# Patient Record
Sex: MALE | Race: WHITE | Employment: OTHER | URBAN - METROPOLITAN AREA
[De-identification: names, ages, dates, MRNs, and addresses within clinical notes are randomized per-mention and may not be internally consistent; named-entity substitution may affect disease eponyms.]

---

## 2018-05-01 PROBLEM — Z79.01 LONG TERM (CURRENT) USE OF ANTICOAGULANTS: Status: ACTIVE | Noted: 2018-05-01

## 2018-09-19 ENCOUNTER — APPOINTMENT (OUTPATIENT)
Dept: GENERAL RADIOLOGY | Age: 73
DRG: 246 | End: 2018-09-19
Attending: EMERGENCY MEDICINE
Payer: COMMERCIAL

## 2018-09-19 ENCOUNTER — HOSPITAL ENCOUNTER (INPATIENT)
Age: 73
LOS: 2 days | Discharge: HOME | DRG: 246 | End: 2018-09-21
Attending: EMERGENCY MEDICINE | Admitting: INTERNAL MEDICINE
Payer: COMMERCIAL

## 2018-09-19 DIAGNOSIS — I21.4 NSTEMI (NON-ST ELEVATED MYOCARDIAL INFARCTION) (HCC): ICD-10-CM

## 2018-09-19 DIAGNOSIS — J44.9 CHRONIC OBSTRUCTIVE PULMONARY DISEASE, UNSPECIFIED COPD TYPE (HCC): ICD-10-CM

## 2018-09-19 DIAGNOSIS — I25.9 CHEST PAIN DUE TO MYOCARDIAL ISCHEMIA, UNSPECIFIED ISCHEMIC CHEST PAIN TYPE: Primary | ICD-10-CM

## 2018-09-19 PROBLEM — Z95.1 S/P CABG X 2: Chronic | Status: ACTIVE | Noted: 2018-09-19

## 2018-09-19 LAB
ALBUMIN SERPL-MCNC: 3.1 G/DL (ref 3.2–4.6)
ALBUMIN/GLOB SERPL: 1 {RATIO} (ref 1.2–3.5)
ALP SERPL-CCNC: 144 U/L (ref 50–136)
ALT SERPL-CCNC: 31 U/L (ref 12–65)
ANION GAP SERPL CALC-SCNC: 7 MMOL/L (ref 7–16)
AST SERPL-CCNC: 28 U/L (ref 15–37)
ATRIAL RATE: 77 BPM
BASOPHILS # BLD: 0.1 K/UL (ref 0–0.2)
BASOPHILS NFR BLD: 1 % (ref 0–2)
BILIRUB SERPL-MCNC: 0.6 MG/DL (ref 0.2–1.1)
BUN SERPL-MCNC: 18 MG/DL (ref 8–23)
CALCIUM SERPL-MCNC: 8 MG/DL (ref 8.3–10.4)
CALCULATED P AXIS, ECG09: 67 DEGREES
CALCULATED R AXIS, ECG10: 36 DEGREES
CALCULATED T AXIS, ECG11: 89 DEGREES
CHLORIDE SERPL-SCNC: 113 MMOL/L (ref 98–107)
CO2 SERPL-SCNC: 25 MMOL/L (ref 21–32)
CREAT SERPL-MCNC: 1.18 MG/DL (ref 0.8–1.5)
DIAGNOSIS, 93000: NORMAL
DIFFERENTIAL METHOD BLD: ABNORMAL
EOSINOPHIL # BLD: 0.1 K/UL (ref 0–0.8)
EOSINOPHIL NFR BLD: 1 % (ref 0.5–7.8)
ERYTHROCYTE [DISTWIDTH] IN BLOOD BY AUTOMATED COUNT: 13.6 %
GLOBULIN SER CALC-MCNC: 3.2 G/DL (ref 2.3–3.5)
GLUCOSE SERPL-MCNC: 96 MG/DL (ref 65–100)
HCT VFR BLD AUTO: 44.5 % (ref 41.1–50.3)
HGB BLD-MCNC: 14.4 G/DL (ref 13.6–17.2)
IMM GRANULOCYTES # BLD: 0.3 K/UL (ref 0–0.5)
IMM GRANULOCYTES NFR BLD AUTO: 2 % (ref 0–5)
INR PPP: 2.3
LYMPHOCYTES # BLD: 1.4 K/UL (ref 0.5–4.6)
LYMPHOCYTES NFR BLD: 11 % (ref 13–44)
MCH RBC QN AUTO: 31.3 PG (ref 26.1–32.9)
MCHC RBC AUTO-ENTMCNC: 32.4 G/DL (ref 31.4–35)
MCV RBC AUTO: 96.7 FL (ref 79.6–97.8)
MONOCYTES # BLD: 1 K/UL (ref 0.1–1.3)
MONOCYTES NFR BLD: 7 % (ref 4–12)
NEUTS SEG # BLD: 10.4 K/UL (ref 1.7–8.2)
NEUTS SEG NFR BLD: 79 % (ref 43–78)
NRBC # BLD: 0 K/UL (ref 0–0.2)
P-R INTERVAL, ECG05: 120 MS
PLATELET # BLD AUTO: 234 K/UL (ref 150–450)
PMV BLD AUTO: 10.3 FL (ref 9.4–12.3)
POTASSIUM SERPL-SCNC: 3.9 MMOL/L (ref 3.5–5.1)
PROT SERPL-MCNC: 6.3 G/DL (ref 6.3–8.2)
PROTHROMBIN TIME: 24.2 SEC (ref 11.5–14.5)
Q-T INTERVAL, ECG07: 356 MS
QRS DURATION, ECG06: 86 MS
QTC CALCULATION (BEZET), ECG08: 402 MS
RBC # BLD AUTO: 4.6 M/UL (ref 4.23–5.6)
SODIUM SERPL-SCNC: 145 MMOL/L (ref 136–145)
TROPONIN I BLD-MCNC: 0.23 NG/ML (ref 0.02–0.05)
TROPONIN I SERPL-MCNC: 2.5 NG/ML (ref 0.02–0.05)
VENTRICULAR RATE, ECG03: 77 BPM
WBC # BLD AUTO: 13.2 K/UL (ref 4.3–11.1)

## 2018-09-19 PROCEDURE — 93005 ELECTROCARDIOGRAM TRACING: CPT | Performed by: EMERGENCY MEDICINE

## 2018-09-19 PROCEDURE — 74011250637 HC RX REV CODE- 250/637: Performed by: NURSE PRACTITIONER

## 2018-09-19 PROCEDURE — 94640 AIRWAY INHALATION TREATMENT: CPT

## 2018-09-19 PROCEDURE — 85025 COMPLETE CBC W/AUTO DIFF WBC: CPT

## 2018-09-19 PROCEDURE — 71045 X-RAY EXAM CHEST 1 VIEW: CPT

## 2018-09-19 PROCEDURE — 36415 COLL VENOUS BLD VENIPUNCTURE: CPT

## 2018-09-19 PROCEDURE — 74011250636 HC RX REV CODE- 250/636: Performed by: INTERNAL MEDICINE

## 2018-09-19 PROCEDURE — 74011000250 HC RX REV CODE- 250: Performed by: EMERGENCY MEDICINE

## 2018-09-19 PROCEDURE — 85610 PROTHROMBIN TIME: CPT

## 2018-09-19 PROCEDURE — 80053 COMPREHEN METABOLIC PANEL: CPT

## 2018-09-19 PROCEDURE — 99285 EMERGENCY DEPT VISIT HI MDM: CPT | Performed by: EMERGENCY MEDICINE

## 2018-09-19 PROCEDURE — 65660000000 HC RM CCU STEPDOWN

## 2018-09-19 PROCEDURE — 94760 N-INVAS EAR/PLS OXIMETRY 1: CPT

## 2018-09-19 PROCEDURE — 74011250637 HC RX REV CODE- 250/637: Performed by: INTERNAL MEDICINE

## 2018-09-19 PROCEDURE — 84484 ASSAY OF TROPONIN QUANT: CPT

## 2018-09-19 RX ORDER — HYDROCODONE BITARTRATE AND ACETAMINOPHEN 7.5; 325 MG/1; MG/1
1 TABLET ORAL
Status: DISCONTINUED | OUTPATIENT
Start: 2018-09-19 | End: 2018-09-20

## 2018-09-19 RX ORDER — OMEPRAZOLE 40 MG/1
40 CAPSULE, DELAYED RELEASE ORAL DAILY
Status: ON HOLD | COMMUNITY
End: 2018-09-26 | Stop reason: CLARIF

## 2018-09-19 RX ORDER — TOPIRAMATE 100 MG/1
100 TABLET, FILM COATED ORAL 2 TIMES DAILY
Status: DISCONTINUED | OUTPATIENT
Start: 2018-09-19 | End: 2018-09-19 | Stop reason: SDUPTHER

## 2018-09-19 RX ORDER — NITROGLYCERIN 0.4 MG/1
0.4 TABLET SUBLINGUAL
Status: DISCONTINUED | OUTPATIENT
Start: 2018-09-19 | End: 2018-09-21 | Stop reason: HOSPADM

## 2018-09-19 RX ORDER — ATORVASTATIN CALCIUM 40 MG/1
40 TABLET, FILM COATED ORAL
Status: DISCONTINUED | OUTPATIENT
Start: 2018-09-19 | End: 2018-09-21 | Stop reason: HOSPADM

## 2018-09-19 RX ORDER — SODIUM CHLORIDE 0.9 % (FLUSH) 0.9 %
5-10 SYRINGE (ML) INJECTION AS NEEDED
Status: DISCONTINUED | OUTPATIENT
Start: 2018-09-19 | End: 2018-09-21 | Stop reason: HOSPADM

## 2018-09-19 RX ORDER — ASPIRIN 81 MG/1
81 TABLET ORAL DAILY
Status: DISCONTINUED | OUTPATIENT
Start: 2018-09-20 | End: 2018-09-21

## 2018-09-19 RX ORDER — MORPHINE SULFATE 2 MG/ML
2 INJECTION, SOLUTION INTRAMUSCULAR; INTRAVENOUS
Status: DISCONTINUED | OUTPATIENT
Start: 2018-09-19 | End: 2018-09-21 | Stop reason: HOSPADM

## 2018-09-19 RX ORDER — HEPARIN SODIUM 5000 [USP'U]/ML
4000 INJECTION, SOLUTION INTRAVENOUS; SUBCUTANEOUS ONCE
Status: DISCONTINUED | OUTPATIENT
Start: 2018-09-20 | End: 2018-09-19

## 2018-09-19 RX ORDER — VENLAFAXINE 25 MG/1
25 TABLET ORAL 3 TIMES DAILY
Status: DISCONTINUED | OUTPATIENT
Start: 2018-09-19 | End: 2018-09-19

## 2018-09-19 RX ORDER — LISINOPRIL 5 MG/1
2.5 TABLET ORAL DAILY
Status: DISCONTINUED | OUTPATIENT
Start: 2018-09-20 | End: 2018-09-21 | Stop reason: HOSPADM

## 2018-09-19 RX ORDER — ALBUTEROL SULFATE 0.83 MG/ML
5 SOLUTION RESPIRATORY (INHALATION)
Status: COMPLETED | OUTPATIENT
Start: 2018-09-19 | End: 2018-09-19

## 2018-09-19 RX ORDER — TOPIRAMATE 100 MG/1
100 TABLET, FILM COATED ORAL DAILY
Status: DISCONTINUED | OUTPATIENT
Start: 2018-09-20 | End: 2018-09-21 | Stop reason: HOSPADM

## 2018-09-19 RX ORDER — ISOSORBIDE MONONITRATE 30 MG/1
30 TABLET, EXTENDED RELEASE ORAL DAILY
Status: DISCONTINUED | OUTPATIENT
Start: 2018-09-20 | End: 2018-09-21 | Stop reason: HOSPADM

## 2018-09-19 RX ORDER — HEPARIN SODIUM 5000 [USP'U]/100ML
12-25 INJECTION, SOLUTION INTRAVENOUS
Status: DISCONTINUED | OUTPATIENT
Start: 2018-09-20 | End: 2018-09-19

## 2018-09-19 RX ORDER — SODIUM CHLORIDE 0.9 % (FLUSH) 0.9 %
5-10 SYRINGE (ML) INJECTION EVERY 8 HOURS
Status: DISCONTINUED | OUTPATIENT
Start: 2018-09-19 | End: 2018-09-21 | Stop reason: HOSPADM

## 2018-09-19 RX ORDER — LANOLIN ALCOHOL/MO/W.PET/CERES
400 CREAM (GRAM) TOPICAL DAILY
Status: DISCONTINUED | OUTPATIENT
Start: 2018-09-20 | End: 2018-09-21 | Stop reason: HOSPADM

## 2018-09-19 RX ORDER — TOPIRAMATE 100 MG/1
100 TABLET, FILM COATED ORAL 2 TIMES DAILY
Status: DISCONTINUED | OUTPATIENT
Start: 2018-09-19 | End: 2018-09-19

## 2018-09-19 RX ORDER — LEVOTHYROXINE SODIUM 50 UG/1
25 TABLET ORAL
Status: DISCONTINUED | OUTPATIENT
Start: 2018-09-20 | End: 2018-09-21 | Stop reason: HOSPADM

## 2018-09-19 RX ORDER — LORAZEPAM 1 MG/1
1 TABLET ORAL
Status: DISCONTINUED | OUTPATIENT
Start: 2018-09-19 | End: 2018-09-21 | Stop reason: HOSPADM

## 2018-09-19 RX ORDER — HEPARIN SODIUM 5000 [USP'U]/100ML
12-25 INJECTION, SOLUTION INTRAVENOUS
Status: DISCONTINUED | OUTPATIENT
Start: 2018-09-20 | End: 2018-09-21 | Stop reason: HOSPADM

## 2018-09-19 RX ORDER — PANTOPRAZOLE SODIUM 40 MG/1
40 TABLET, DELAYED RELEASE ORAL
Status: DISCONTINUED | OUTPATIENT
Start: 2018-09-20 | End: 2018-09-21 | Stop reason: HOSPADM

## 2018-09-19 RX ORDER — VENLAFAXINE 25 MG/1
25 TABLET ORAL
Status: DISCONTINUED | OUTPATIENT
Start: 2018-09-19 | End: 2018-09-21 | Stop reason: HOSPADM

## 2018-09-19 RX ORDER — TAMSULOSIN HYDROCHLORIDE 0.4 MG/1
0.4 CAPSULE ORAL DAILY
Status: DISCONTINUED | OUTPATIENT
Start: 2018-09-20 | End: 2018-09-21 | Stop reason: HOSPADM

## 2018-09-19 RX ORDER — PREDNISONE 1 MG/1
5 TABLET ORAL 2 TIMES DAILY
COMMUNITY
End: 2018-09-26

## 2018-09-19 RX ORDER — ONDANSETRON 2 MG/ML
4 INJECTION INTRAMUSCULAR; INTRAVENOUS
Status: DISCONTINUED | OUTPATIENT
Start: 2018-09-19 | End: 2018-09-21 | Stop reason: HOSPADM

## 2018-09-19 RX ADMIN — VENLAFAXINE 25 MG: 25 TABLET ORAL at 22:34

## 2018-09-19 RX ADMIN — HEPARIN SODIUM AND DEXTROSE 12 UNITS/KG/HR: 5000; 5 INJECTION INTRAVENOUS at 22:42

## 2018-09-19 RX ADMIN — Medication 10 ML: at 22:35

## 2018-09-19 RX ADMIN — HYDROCODONE BITARTRATE AND ACETAMINOPHEN 1 TABLET: 7.5; 325 TABLET ORAL at 21:10

## 2018-09-19 RX ADMIN — ALBUTEROL SULFATE 5 MG: 2.5 SOLUTION RESPIRATORY (INHALATION) at 16:59

## 2018-09-19 RX ADMIN — ATORVASTATIN CALCIUM 40 MG: 40 TABLET, FILM COATED ORAL at 22:35

## 2018-09-19 NOTE — PROGRESS NOTES
Skin assessment completed with second RN and reveals the following: extensive bruising on BUE's. Heels flaky, toenails thick. Sacrum/inner gluteal folds with pinkness; however blanchable. Scattered moles, no other abnormality noted.

## 2018-09-19 NOTE — IP AVS SNAPSHOT
[address] 
[phone] Patient: Eddie Christianson MRN: [id number] ZPA:[id number] About your hospitalization You were admitted on:  September 19, 2018 You last received care in the:  [facility] 3 TELEMETRY You were discharged on:  September 21, 2018 Why you were hospitalized Your primary diagnosis was:  Nstemi (Non-St Elevated Myocardial Infarction) (Formerly [facility]) Your diagnoses also included: Aortic Stenosis, S/P Avr (Aortic Valve Replacement), Essential Hypertension, Tobacco Use Disorder, Mixed Hyperlipidemia, Coronary Atherosclerosis Of Native Coronary Artery, S/P Cabg X 2, Copd (Chronic Obstructive Pulmonary Disease) (Hcc) Follow-up Information Follow up With Details Comments Contact Info Tariq Madden MD  As needed 1 [address] [id number] 
[phone] [facility] CARDIOLOGY [place] On 9/26/2018 Follow up with Dr. Eddie Baez on 9-26-18 @ [phone] in the [facility] office [id number] [place] [id number] [phone] [facility]  check INR on Tuesday [id number] [place] [phone] Your Scheduled Appointments Wednesday September 26, 2018 12:00 PM EDT TRANSITIONAL CARE MANAGEMENT with Brenden Oakes MD  
[address] [facility] ([address] [place] [address]) [address] [place] [address] [place] [id number] [phone] Discharge Orders Procedure Order Date Status Priority Quantity Spec Type Associated Dx REFERRAL TO CARDIAC [facility] 09/21/18 0910 Normal Routine 1 A check jerardo indicates which time of day the medication should be taken. My Medications START taking these medications Instructions Each Dose to Equal  
 Morning Noon Evening Bedtime  
 clopidogrel 75 mg Tab Commonly known as:  PLAVIX Start taking on:  9/22/2018 Take 1 Tab by mouth daily.   
 75 mg  
    
   
 lisinopril 2.5 mg tablet Commonly known as:  Marilin Primmer Start taking on:  9/22/2018 Take 1 Tab by mouth daily. 2.5 mg  
    
   
   
   
  
 metoprolol succinate 25 mg XL tablet Commonly known as:  TOPROL-XL Start taking on:  9/22/2018 Take 1 Tab by mouth daily. 25 mg  
    
   
   
   
  
 nitroglycerin 0.4 mg SL tablet Commonly known as:  NITROSTAT  
   
 1 Tab by SubLINGual route every five (5) minutes as needed for Chest Pain. Up to 3 doses. 0.4 mg  
    
   
   
   
  
  
CONTINUE taking these medications Instructions Each Dose to Equal  
 Morning Noon Evening Bedtime  
 aspirin delayed-release 81 mg tablet Take  by mouth daily. atorvastatin 40 mg tablet Commonly known as:  LIPITOR Take  by mouth daily. Dexlansoprazole 60 mg Cpdb Take  by mouth. EYE VITAMIN AND MINERALS PO Take  by mouth. fish oil-omega-3 fatty acids 340-1,000 mg capsule Take 1 Cap by mouth daily. 1 Cap HYDROcodone-acetaminophen 7.5-325 mg per tablet Commonly known as:  March Castles Take  by mouth.  
     
   
   
   
  
 isosorbide mononitrate ER 30 mg tablet Commonly known as:  IMDUR Take  by mouth daily. levothyroxine 25 mcg tablet Commonly known as:  SYNTHROID Take  by mouth Daily (before breakfast). MAGNESIUM PO Take  by mouth.  
     
   
   
   
  
 potassium 99 mg tablet Take 99 mg by mouth daily. 99 mg  
    
   
   
   
  
 predniSONE 1 mg tablet Commonly known as:  Pallavien Arbour Take 5 mg by mouth two (2) times a day. Indications: breathing 5 mg PriLOSEC 40 mg capsule Generic drug:  omeprazole Take 40 mg by mouth daily. 40 mg  
    
  
   
   
   
  
 tamsulosin 0.4 mg capsule Commonly known as:  FLOMAX Take 0.4 mg by mouth daily. 0.4 mg  
    
  
   
   
   
  
 tiotropium 18 mcg inhalation capsule Commonly known as:  Sonia Oglesby Take 1 Cap by inhalation daily. 1 Cap  
    
   
   
   
  
 topiramate 100 mg tablet Commonly known as:  TOPAMAX Take 100 mg by mouth daily. 100 mg  
    
   
   
   
  
 venlafaxine 75 mg tablet Commonly known as:  [facility] Take 25 mg by mouth nightly. 25 mg  
    
   
   
   
  
 warfarin 1 mg tablet Commonly known as:  COUMADIN Take 1 mg by mouth daily. Brand name 1 mg Where to Get Your Medications Information on where to get these meds will be given to you by the nurse or doctor. ! Ask your nurse or doctor about these medications  
  clopidogrel 75 mg Tab  
 lisinopril 2.5 mg tablet  
 metoprolol succinate 25 mg XL tablet  
 nitroglycerin 0.4 mg SL tablet Opioid Education Prescription Opioids: What You Need to Know: 
 
Prescription opioids can be used to help relieve moderate-to-severe pain and are often prescribed following a surgery or injury, or for certain health conditions. These medications can be an important part of treatment but also come with serious risks. Opioids are strong pain medicines. Examples include hydrocodone, oxycodone, fentanyl, and morphine. Heroin is an example of an illegal opioid. It is important to work with your health care provider to make sure you are getting the safest, most effective care. WHAT ARE THE RISKS AND SIDE EFFECTS OF OPIOID USE? Prescription opioids carry serious risks of addiction and overdose, especially with prolonged use. An opioid overdose, often marked by slow breathing, can cause sudden death. The use of prescription opioids can have a number of side effects as well, even when taken as directed. · Tolerance-meaning you might need to take more of a medication for the same pain relief · Physical dependence-meaning you have symptoms of withdrawal when the medication is stopped. Withdrawal symptoms can include nausea, sweating, chills, diarrhea, stomach cramps, and muscle aches. Withdrawal can last up to several weeks, depending on which drug you took and how long you took it. · Increased sensitivity to pain · Constipation · Nausea, vomiting, and dry mouth · Sleepiness and dizziness · Confusion · Depression · Low levels of testosterone that can result in lower sex drive, energy, and strength · Itching and sweating RISKS ARE GREATER WITH:      
· History of drug misuse, substance use disorder, or overdose · Mental health conditions (such as depression or anxiety) · Sleep apnea · Older age (72 years or older) · Pregnancy Avoid alcohol while taking prescription opioids. Also, unless specifically advised by your health care provider, medications to avoid include: · Benzodiazepines (such as Xanax or Valium) · Muscle relaxants (such as Soma or Flexeril) · Hypnotics (such as Ambien or Lunesta) · Other prescription opioids KNOW YOUR OPTIONS Talk to your health care provider about ways to manage your pain that don't involve prescription opioids. Some of these options may actually work better and have fewer risks and side effects. Consult your physician before adding or stopping any medications, treatments, or physical activity. Options may include: 
· Pain relievers such as acetaminophen, ibuprofen, and naproxen · Some medications that are also used for depression or seizures · Physical therapy and exercise · Counseling to help patients learn how to cope better with triggers of pain and stress. · Application of heat or cold compress · Massage therapy · Relaxation techniques Be Informed Make sure you know the name of your medication, how much and how often to take it, and its potential risks & side effects.  
 
IF YOU ARE PRESCRIBED OPIOIDS FOR PAIN: 
 · Never take opioids in greater amounts or more often than prescribed. Remember the goal is not to be pain-free but to manage your pain at a tolerable level. · Follow up with your primary care provider to: · Work together to create a plan on how to manage your pain. · Talk about ways to help manage your pain that don't involve prescription opioids. · Talk about any and all concerns and side effects. · Help prevent misuse and abuse. · Never sell or share prescription opioids · Help prevent misuse and abuse. · Store prescription opioids in a secure place and out of reach of others (this may include visitors, children, friends, and family). · Safely dispose of unused/unwanted prescription opioids: Find your community drug take-back program or your pharmacy mail-back program, or flush them down the toilet, following guidance from the Food and Drug Administration (www.fda.gov/Drugs/ResourcesForYou). · Visit www.cdc.gov/drugoverdose to learn about the risks of opioid abuse and overdose. · If you believe you may be struggling with addiction, tell your health care provider and ask for guidance or call [employer] at 4-121-117-KHMM. Discharge Instructions Cardiac Catheterization/Angiography Discharge Instructions *Check the puncture site frequently for swelling or bleeding. If you see any bleeding, lie down and apply pressure over the area with a clean town or washcloth. Notify your doctor for any redness, swelling, drainage or oozing from the puncture site. Notify your doctor for any fever or chills. *If the leg or arm with the puncture becomes cold, numb or painful, call [facility] Cardiology at  [phone]. *Activity should be limited for the next 48 hours.  Climb stairs as little as possible and avoid any stooping, bending or strenuous activity for 48 hours. No heavy lifting (anything over 10 pounds) for three days. *Do not drive for 48 hours. *You may resume your usual diet. Drink more fluids than usual. 
 
*Have a responsible person drive you home and stay with you for at least 24 hours after your heart catheterization/angiography. *You may remove the bandage from your Right and Groin in 24 hours. You may shower in 24 hours. No tub baths, hot tubs or swimming for one week. Do not place any lotions, creams, powders, ointments over the puncture site for one week. You may place a clean band-aid over the puncture site each day for 5 days. Change this daily. Percutaneous Coronary Intervention: What to Expect at [facility] Your Recovery Percutaneous coronary intervention (PCI) is the name for procedures that are used to open a narrowed or blocked coronary artery. The two most common PCI procedures are coronary angioplasty and coronary stent placement. Your groin or arm may have a bruise and feel sore for a day or two after a percutaneous coronary intervention (PCI). You can do light activities around the house, but nothing strenuous for several days. This care sheet gives you a general idea about how long it will take for you to recover. But each person recovers at a different pace. Follow the steps below to get better as quickly as possible. How can you care for yourself at home? Activity 
  · If the doctor gave you a sedative: ¨ For 24 hours, don't do anything that requires attention to detail. It takes time for the medicine's effects to completely wear off. ¨ For your safety, do not drive or operate any machinery that could be dangerous. Wait until the medicine wears off and you can think clearly and react easily.  
  · Do not do strenuous exercise and do not lift, pull, or push anything heavy until your doctor says it is okay. This may be for a day or two. You can walk around the house and do light activity, such as cooking.   · If the catheter was placed in your groin, try not to walk up stairs for the first couple of days.  
  · If the catheter was placed in your arm near your wrist, do not bend your wrist deeply for the first couple of days. Be careful using your hand to get into and out of a chair or bed.  
  · Carry your stent identification card with you at all times.  
  · If your doctor recommends it, get more exercise. Walking is a good choice. Bit by bit, increase the amount you walk every day. Try for at least 30 minutes on most days of the week. Diet 
  · Drink plenty of fluids to help your body flush out the dye. If you have kidney, heart, or liver disease and have to limit fluids, talk with your doctor before you increase the amount of fluids you drink.  
  · Keep eating a heart-healthy diet that has lots of fruits, vegetables, and whole grains. If you have not been eating this way, talk to your doctor. You also may want to talk to a dietitian. This expert can help you to learn about healthy foods and plan meals. Medicines 
  · Your doctor will tell you if and when you can restart your medicines. He or she will also give you instructions about taking any new medicines.  
  · If you take blood thinners, such as warfarin (Coumadin), clopidogrel (Plavix), or aspirin, be sure to talk to your doctor. He or she will tell you if and when to start taking those medicines again. Make sure that you understand exactly what your doctor wants you to do.  
  · Your doctor will prescribe blood-thinning medicines. You will likely take aspirin plus another antiplatelet, such as clopidogrel (Plavix). It is very important that you take these medicines exactly as directed. These medicines help keep the coronary artery open and reduce your risk of a heart attack.  
  · Call your doctor if you think you are having a problem with your medicine.  
Courtney Mota of the catheter site   · For 1 or 2 days, keep a bandage over the spot where the catheter was inserted. The bandage probably will fall off in this time.  
  · Put ice or a cold pack on the area for 10 to 20 minutes at a time to help with soreness or swelling. Put a thin cloth between the ice and your skin.  
  · You may shower 24 to 48 hours after the procedure, if your doctor okays it. Pat the incision dry.  
  · Do not soak the catheter site until it is healed. Don't take a bath for 1 week, or until your doctor tells you it is okay.  
  · If you are bleeding, lie down and press on the area for 15 minutes to try to make it stop. If the bleeding does not stop, call your doctor or seek immediate medical care. Follow-up care is a key part of your treatment and safety. Be sure to make and go to all appointments, and call your doctor if you are having problems. It's also a good idea to know your test results and keep a list of the medicines you take. When should you call for help? Call 911 anytime you think you may need emergency care. For example, call if: 
  · You passed out (lost consciousness).  
  · You have severe trouble breathing.  
  · You have sudden chest pain and shortness of breath, or you cough up blood.  
  · You have symptoms of a heart attack, such as: ¨ Chest pain or pressure. ¨ Sweating. ¨ Shortness of breath. ¨ Nausea or vomiting. ¨ Pain that spreads from the chest to the neck, jaw, or one or both shoulders or arms. ¨ Dizziness or lightheadedness. ¨ A fast or uneven pulse. After calling 911, chew 1 adult-strength aspirin. Wait for an ambulance. Do not try to drive yourself.  
  · You have been diagnosed with angina, and you have angina symptoms that do not go away with rest or are not getting better within 5 minutes after you take one dose of nitroglycerin.  
 Call your doctor now or seek immediate medical care if: 
  · You are bleeding from the area where the catheter was put in your artery.   · You have a fast-growing, painful lump at the catheter site.  
  · You have signs of infection, such as: 
¨ Increased pain, swelling, warmth, or redness. ¨ Red streaks leading from the catheter site. ¨ Pus draining from the catheter site. ¨ A fever.  
  · Your leg or arm looks blue or feels cold, numb, or tingly.  
 Watch closely for changes in your health, and be sure to contact your doctor if you have any problems. Where can you learn more? Go to http://sukhdev-vahe.info/. Enter G781 in the search box to learn more about \"Percutaneous Coronary Intervention: What to Expect at Home. \" Current as of: December 6, 2017 Content Version: 11.7 © 2773-3552 [employer]. Care instructions adapted under license by [employer] (which disclaims liability or warranty for this information). If you have questions about a medical condition or this instruction, always ask your healthcare professional. [place] [address] any warranty or liability for your use of this information. Clopidogrel (By mouth) Clopidogrel (sdsz-MWC-gn-grel) Helps prevent stroke, heart attack, and other heart problems. This medicine is a platelet inhibitor. Brand Name(s): Plavix There may be other brand names for this medicine. When This Medicine Should Not Be Used: This medicine is not right for everyone. Do not use it if you had an allergic reaction to clopidogrel. How to Use This Medicine:  
Tablet · Your doctor will tell you how much medicine to use. Do not use more than directed. · This medicine should come with a Medication Guide. Ask your pharmacist for a copy if you do not have one. · Missed dose: Take a dose as soon as you remember. If it is almost time for your next dose, wait until then and take a regular dose. Do not take extra medicine to make up for a missed dose.  
· Store the medicine in a closed container at room temperature, away from heat, moisture, and direct light. Drugs and Foods to Avoid: Ask your doctor or pharmacist before using any other medicine, including over-the-counter medicines, vitamins, and herbal products. · Some medicines can affect how clopidogrel works. Tell your doctor if you are using any of the following: ¨ Esomeprazole, omeprazole, repaglinide, or warfarin ¨ Medicine to treat depression ¨ NSAID pain or arthritis medicine (including celecoxib, diclofenac, ibuprofen, or naproxen) Warnings While Using This Medicine: · Tell your doctor if you are pregnant or breastfeeding, or if you have bleeding problems, stomach ulcer or bleeding, a recent stroke, or have a history of bleeding problems. · This medicine can cause you to bleed and bruise more easily. Take precautions to avoid injury. Brush and floss your teeth gently, do not play rough sports, and be careful with sharp objects. Severe bleeding can be life-threatening. · This medicine may cause a rare but serious blood clotting condition called thrombotic thrombocytopenic purpura. · Make sure any doctor or dentist who treats you knows that you are using this medicine. Tell your doctor if you plan to have surgery or a dental procedure. · Do not stop using this medicine suddenly. Your doctor will need to slowly decrease your dose before you stop it completely. · Your doctor will check your progress and the effects of this medicine at regular visits. Keep all appointments. · Keep all medicine out of the reach of children. Never share your medicine with anyone. Possible Side Effects While Using This Medicine:  
Call your doctor right away if you notice any of these side effects: · Allergic reaction: Itching or hives, swelling in your face or hands, swelling or tingling in your mouth or throat, chest tightness, trouble breathing · Bloody or black, tarry stools · Nosebleeds · Pinpoint red or purple spots on your skin or in your mouth · Problems with vision, speech, or walking · Red or dark brown urine · Seizures · Severe stomach pain · Trouble breathing, tiredness, fast heartbeat, yellow skin or eyes · Unusual bleeding, bruising, or weakness · Vomiting of blood or vomit that looks like coffee grounds If you notice other side effects that you think are caused by this medicine, tell your doctor. Call your doctor for medical advice about side effects. You may report side effects to FDA at 2-603-EUK-2843 © 2017 [facility] Information is for End User's use only and may not be sold, redistributed or otherwise used for commercial purposes. The above information is an  only. It is not intended as medical advice for individual conditions or treatments. Talk to your doctor, nurse or pharmacist before following any medical regimen to see if it is safe and effective for you. Serstech Announcement We are excited to announce that we are making your provider's discharge notes available to you in Serstech. You will see these notes when they are completed and signed by the physician that discharged you from your recent hospital stay. If you have any questions or concerns about any information you see in Serstech, please call the Health Information Department where you were seen or reach out to your Primary Care Provider for more information about your plan of care. Introducing [facility]! Karen Carrington introduces Serstech patient portal. Now you can access parts of your medical record, email your doctor's office, and request medication refills online. 1. In your internet browser, go to [email or web address] 2. Click on the First Time User? Click Here link in the Sign In box. You will see the New Member Sign Up page. 3. Enter your Serstech Access Code exactly as it appears below. You will not need to use this code after youve completed the sign-up process.  If you do not sign up before the expiration date, you must request a new code. · EarlyDoc Access Code: [email or web address] Expires: 11/19/2018  4:07 PM 
 
4. Enter the last four digits of your Social Security Number (xxxx) and Date of Birth (mm/dd/yyyy) as indicated and click Submit. You will be taken to the next sign-up page. 5. Create a EnCoatet ID. This will be your EarlyDoc login ID and cannot be changed, so think of one that is secure and easy to remember. 6. Create a EarlyDoc password. You can change your password at any time. 7. Enter your Password Reset Question and Answer. This can be used at a later time if you forget your password. 8. Enter your e-mail address. You will receive e-mail notification when new information is available in [address] 9. Click Sign Up. You can now view and download portions of your medical record. 10. Click the Download Summary menu link to download a portable copy of your medical information. If you have questions, please visit the Frequently Asked Questions section of the EarlyDoc website. Remember, EarlyDoc is NOT to be used for urgent needs. For medical emergencies, dial 911. Now available from your iPhone and Android! Introducing Shahzad Ashton As a [facility] patient, I wanted to make you aware of our electronic visit tool called Shahzad Ashton. [facility] 24/7 allows you to connect within minutes with a medical provider 24 hours a day, seven days a week via a mobile device or tablet or logging into a secure website from your computer. You can access Shahzad Ashton from anywhere in the [place].  
 
A virtual visit might be right for you when you have a simple condition and feel like you just dont want to get out of bed, or cant get away from work for an appointment, when your regular [facility] provider is not available (evenings, weekends or holidays), or when youre out of town and need minor care. Electronic visits cost only $49 and if the [facility] provider determines a prescription is needed to treat your condition, one can be electronically transmitted to a nearby pharmacy*. Please take a moment to enroll today if you have not already done so. The enrollment process is free and takes just a few minutes. To enroll, please download the [facility] ady to your tablet or phone, or visit [email or web address] to enroll on your computer. And, as an [address] [place] [address] patient with a [employer] account, the results of your visits will be scanned into your electronic medical record and your primary care provider will be able to view the scanned results. We urge you to continue to see your regular [employer] provider for your ongoing medical care. And while your primary care provider may not be the one available when you seek a [facility] virtual visit, the peace of mind you get from getting a real diagnosis real time can be priceless. For more information on [facility], view our Frequently Asked Questions (FAQs) at [email or web address]. Sincerely, 
 
Kath Fenton MD 
Chief Medical Officer [facility] Yaritza Millan *:  certain medications cannot be prescribed via [facility] Providers Seen During Your Hospitalization Provider Specialty Primary office phone Cherelle Vega MD Emergency Medicine [phone] Valerie Sampson MD Cardiology [phone] Immunizations Administered for This Admission Name Date Influenza Vaccine (Quad) PF  Deferred () Your Primary Care Physician (PCP) Primary Care Physician Office Phone Office Fax Sanket Siddiuqi [phone] You are allergic to the following Allergen Reactions Advair Diskus (Fluticasone-Salmeterol) Unknown (comments)  
 rash Recent Documentation Height Weight BMI Smoking Status 1.727 m 91.5 kg 30.67 kg/m2 Current Every Day Smoker Emergency Contacts Name Discharge Info Relation Home Work Mobile Kelsey Munroe  Girlfriend [18] [phone] Patient Belongings The following personal items are in your possession at time of discharge: 
  Dental Appliances: At home  Visual Aid: None      Home Medications: None   Jewelry: Ring  Clothing: None    Other Valuables: Cell Phone Discharge Instructions Attachments/References CARDIAC REHABILITATION (ENGLISH) Patient Handouts Cardiac Rehabilitation: Care Instructions Your Care Instructions Cardiac rehabilitation is a program for people who have a heart problem, such as a heart attack, heart failure, or a heart valve disease. The program includes exercise, lifestyle changes, education, and emotional support. Cardiac rehab can help you improve the quality of your life through better overall health. It can help you lose weight and feel better about yourself. On your cardiac rehab team, you may have your doctor, a nurse specialist, a dietitian, and a physical therapist. They will design your cardiac rehab program specifically for you. You will learn how to reduce your risk for heart problems, how to manage stress, and how to eat a heart-healthy diet. By the end of the program, you will be ready to maintain a healthier lifestyle on your own. Follow-up care is a key part of your treatment and safety. Be sure to make and go to all appointments, and call your doctor if you are having problems. It's also a good idea to know your test results and keep a list of the medicines you take. How can you care for yourself at home? · Take your medicines exactly as prescribed. Call your doctor if you think you are having a problem with your medicine. You will get more details on the specific medicines your doctor prescribes. · Weigh yourself every day if your doctor tells you to.  Watch for sudden weight gain. Weigh yourself on the same scale with the same amount of clothing at the same time of day. · Plan your meals so that you are eating heart-healthy foods. ¨ Eat a variety of foods daily. Fresh fruits and vegetables and whole-grains are good choices. ¨ Limit your fat intake, especially saturated and trans fat. ¨ Limit salt (sodium). ¨ Increase fiber in your diet. ¨ Limit alcohol. · Learn how to take your pulse so that you can track your heart rate during exercise. · Always check with your doctor before you begin a new exercise program. 
· Warm up before you exercise and cool down afterward for at least 15 minutes each. This will help your heart gradually prepare for and recover from exercise and avoid pushing your heart too hard. · Stop exercising if you have any unusual discomfort, such as chest pain. · Do not smoke. Smoking can make heart problems worse. If you need help quitting, talk to your doctor about stop-smoking programs and medicines. These can increase your chances of quitting for good. When should you call for help? Call 911 anytime you think you may need emergency care. For example, call if: 
  · You have severe trouble breathing.  
  · You cough up pink, foamy mucus and you have trouble breathing.  
  · You have symptoms of a heart attack. These may include: ¨ Chest pain or pressure, or a strange feeling in the chest. 
¨ Sweating. ¨ Shortness of breath. ¨ Nausea or vomiting. ¨ Pain, pressure, or a strange feeling in the back, neck, jaw, or upper belly or in one or both shoulders or arms. ¨ Lightheadedness or sudden weakness. ¨ A fast or irregular heartbeat. After you call 911, the  may tell you to chew 1 adult-strength or 2 to 4 low-dose aspirin. Wait for an ambulance.  Do not try to drive yourself.  
  · You have angina symptoms (such as chest pain or pressure) that do not go away with rest or are not getting better within 5 minutes after you take a dose of nitroglycerin.  
  · You have symptoms of a stroke. These may include: 
¨ Sudden numbness, tingling, weakness, or loss of movement in your face, arm, or leg, especially on only one side of your body. ¨ Sudden vision changes. ¨ Sudden trouble speaking. ¨ Sudden confusion or trouble understanding simple statements. ¨ Sudden problems with walking or balance. ¨ A sudden, severe headache that is different from past headaches.  
  · You passed out (lost consciousness).  
 Call your doctor now or seek immediate medical care if: 
  · You have new or increased shortness of breath.  
  · You are dizzy or lightheaded, or you feel like you may faint.  
  · You gain weight suddenly, such as more than 2 to 3 pounds in a day or 5 pounds in a week. (Your doctor may suggest a different range of weight gain.)  
  · You have increased swelling in your legs, ankles, or feet.  
 Watch closely for changes in your health, and be sure to contact your doctor if you have any problems. Where can you learn more? Go to http://sukhdev-vahe.info/. Enter W378 in the search box to learn more about \"Cardiac Rehabilitation: Care Instructions. \" Current as of: December 6, 2017 Content Version: 11.7 © 3861-3633 [employer]. Care instructions adapted under license by [employer] (which disclaims liability or warranty for this information). If you have questions about a medical condition or this instruction, always ask your healthcare professional. [place] [address] any warranty or liability for your use of this information. Please provide this summary of care documentation to your next provider. Signatures-by signing, you are acknowledging that this After Visit Summary has been reviewed with you and you have received a copy. Patient Signature:  ____________________________________________________________ Date:  ____________________________________________________________  
  
Yareli Moulds Provider Signature:  ____________________________________________________________ Date:  ____________________________________________________________

## 2018-09-19 NOTE — ED NOTES
TRANSFER - OUT REPORT: 
 
Verbal report given to Cecile Ibarra on Sherie Seed  being transferred to [facility] for routine progression of care Report consisted of patients Situation, Background, Assessment and  
Recommendations(SBAR). Information from the following report(s) SBAR was reviewed with the receiving nurse. Lines:  
Peripheral IV 09/19/18 Left Arm (Active) Site Assessment Clean, dry, & intact 9/19/2018  4:35 PM  
Phlebitis Assessment 0 9/19/2018  4:35 PM  
Infiltration Assessment 0 9/19/2018  4:35 PM  
Dressing Status Clean, dry, & intact 9/19/2018  4:35 PM  
  
 
Opportunity for questions and clarification was provided. Patient transported with: 
 Monitor Registered Nurse

## 2018-09-19 NOTE — ED PROVIDER NOTES
HPI Comments: Patient states he started having chest pain about 10 minutes before he called EMS. Describes it as sudden onset of midsternal  Tightness which radiated to both arms. It got severe in intensity and he had associated shortness of breath and diaphoresis. He has a history of COPD and valve replacement but denies any coronary artery disease. In route,  EMS did an initial EKG which showed diffuse severe ST depression. He had pain at that time. He was given nitroglycerin in route which completely resolved his pain. EMS states that a repeat EKG done shows that his ST segments normalize after that. He states that he gets chest pain about once every year or 2 requiring him to take nitroglycerin. That pain is typically different, usually is right sided. Elements of this note were made using speech recognition software. As such, there may be errors of speech recognition present. Patient is a 68 y.o. male presenting with chest pain. The history is provided by the patient. Chest Pain (Angina) Pertinent negatives include no fever, no nausea and no vomiting. Past Medical History:  
Diagnosis Date  Aortic stenosis 10/22/2015  Chest pain 10/22/2015  COPD (chronic obstructive pulmonary disease) (HCC)  Coronary artery disease  Coronary atherosclerosis of native coronary artery 10/22/2015  CVA (cerebrovascular accident) ([facility] [address] 2004  
 right hemispheric  Essential hypertension 10/22/2015  Mixed hyperlipidemia 10/22/2015  Tobacco use disorder 10/22/2015 Past Surgical History:  
Procedure Laterality Date [facility] History reviewed. No pertinent family history. Social History Social History  Marital status:  Spouse name: N/A  
 Number of children: N/A  
 Years of education: N/A Occupational History  Not on file. Social History Main Topics  Smoking status: Current Every Day Smoker  Smokeless tobacco: Never Used Comment: trying to quit  Alcohol use No  
 Drug use: Not on file  Sexual activity: Not on file Other Topics Concern  Not on file Social History Narrative ALLERGIES: Advair diskus [fluticasone-salmeterol] Review of Systems Constitutional: Negative for chills and fever. Cardiovascular: Positive for chest pain. Gastrointestinal: Negative for nausea and vomiting. All other systems reviewed and are negative. Vitals:  
 09/19/18 1518 BP: 130/71 Pulse: 78 Resp: 18 Temp: 97.7 °F (36.5 °C) SpO2: 97% Weight: 90.7 kg (200 lb) Height: 5' 8\" (1.727 m) Physical Exam  
Constitutional: He is oriented to person, place, and time. He appears well-developed and well-nourished. HENT:  
Head: Normocephalic and atraumatic. Eyes: Conjunctivae are normal. Pupils are equal, round, and reactive to light. Neck: Normal range of motion. Neck supple. Cardiovascular: Normal rate and regular rhythm. Pulmonary/Chest: Effort normal. No respiratory distress. He has wheezes. Abdominal: Soft. Bowel sounds are normal.  
Musculoskeletal: He exhibits no edema or tenderness. Neurological: He is alert and oriented to person, place, and time. Skin: Skin is warm and dry. Psychiatric: He has a normal mood and affect. His behavior is normal.  
Nursing note and vitals reviewed. MDM Number of Diagnoses or Management Options Chest pain due to myocardial ischemia, unspecified ischemic chest pain type: new and requires workup Chronic obstructive pulmonary disease, unspecified COPD type ([facility] [address]): NSTEMI (non-ST elevated myocardial infarction) [facility]):  
Diagnosis management comments: 3:25 PM initial EKG shows diffuse severe ST depression. Subsequent EKG after nitroglycerin was given and pain resolved  Shows the ST segments have normalized. Patient is currently pain-free. 3:52 PM patient resting comfortably in no pain 4:39 PM discussed results with patient, need for admission. I spoke with Dr. Marin Carrion, to see patient in the ER for admission. Amount and/or Complexity of Data Reviewed Clinical lab tests: ordered and reviewed Tests in the radiology section of CPT®: ordered and reviewed Tests in the medicine section of CPT®: ordered and reviewed Discuss the patient with other providers: yes Risk of Complications, Morbidity, and/or Mortality Presenting problems: high Diagnostic procedures: moderate Management options: moderate Patient Progress Patient progress: stable ED Course Procedures

## 2018-09-19 NOTE — PROGRESS NOTES
Bedside and Verbal shift change report given to Carol Pepe RN (oncoming nurse) by Faby Franz (student nurse) and Agustin Waite RN (offgoing nurse). Report included the following information SBAR, Kardex, MAR and Recent Results.

## 2018-09-19 NOTE — ED TRIAGE NOTES
Pt arrived via EMS from home with c/o chest pain, SOB and diaphoretic. EKG showed ST depression. EMS gave 1in Nitro paste, 1 SL Nitro, 600 plavix and  5000 hep. Pain resolved with Nitro. BP 99/72 HR 72 O2 92% Hx COPD, aortic valve replacement, HTN and HLD.

## 2018-09-19 NOTE — PROGRESS NOTES
Spoke to [employer] in pharmacy. INR needs to be <2.0 prior to starting heparin gtt. INR currently 2.3. Recheck ordered by MD for 0400.

## 2018-09-19 NOTE — IP AVS SNAPSHOT
[address] 
[phone] Patient: Yani Vivar MRN: [id number] JDP:[birth year] A check jerarod indicates which time of day the medication should be taken. My Medications START taking these medications Instructions Each Dose to Equal  
 Morning Noon Evening Bedtime  
 clopidogrel 75 mg Tab Commonly known as:  PLAVIX Start taking on:  9/22/2018 Take 1 Tab by mouth daily. 75 mg  
    
   
   
   
  
 lisinopril 2.5 mg tablet Commonly known as:  Allyssa [place] Start taking on:  9/22/2018 Take 1 Tab by mouth daily. 2.5 mg  
    
   
   
   
  
 metoprolol succinate 25 mg XL tablet Commonly known as:  TOPROL-XL Start taking on:  9/22/2018 Take 1 Tab by mouth daily. 25 mg  
    
   
   
   
  
 nitroglycerin 0.4 mg SL tablet Commonly known as:  NITROSTAT  
   
 1 Tab by SubLINGual route every five (5) minutes as needed for Chest Pain. Up to 3 doses. 0.4 mg  
    
   
   
   
  
  
CONTINUE taking these medications Instructions Each Dose to Equal  
 Morning Noon Evening Bedtime  
 aspirin delayed-release 81 mg tablet Take  by mouth daily. atorvastatin 40 mg tablet Commonly known as:  LIPITOR Take  by mouth daily. Dexlansoprazole 60 mg Cpdb Take  by mouth. EYE VITAMIN AND MINERALS PO Take  by mouth. fish oil-omega-3 fatty acids 340-1,000 mg capsule Take 1 Cap by mouth daily. 1 Cap HYDROcodone-acetaminophen 7.5-325 mg per tablet Commonly known as:  Uszette Mura Take  by mouth.  
     
   
   
   
  
 isosorbide mononitrate ER 30 mg tablet Commonly known as:  IMDUR Take  by mouth daily. levothyroxine 25 mcg tablet Commonly known as:  SYNTHROID  
   
 Take  by mouth Daily (before breakfast). MAGNESIUM PO Take  by mouth.  
     
   
   
   
  
 potassium 99 mg tablet Take 99 mg by mouth daily. 99 mg  
    
   
   
   
  
 predniSONE 1 mg tablet Commonly known as:  Kendal Arbour Take 5 mg by mouth two (2) times a day. Indications: breathing 5 mg PriLOSEC 40 mg capsule Generic drug:  omeprazole Take 40 mg by mouth daily. 40 mg  
    
  
   
   
   
  
 tamsulosin 0.4 mg capsule Commonly known as:  FLOMAX Take 0.4 mg by mouth daily. 0.4 mg  
    
  
   
   
   
  
 tiotropium 18 mcg inhalation capsule Commonly known as:  Emily Meeter Take 1 Cap by inhalation daily. 1 Cap  
    
   
   
   
  
 topiramate 100 mg tablet Commonly known as:  TOPAMAX Take 100 mg by mouth daily. 100 mg  
    
   
   
   
  
 venlafaxine 75 mg tablet Commonly known as:  [facility] Take 25 mg by mouth nightly. 25 mg  
    
   
   
   
  
 warfarin 1 mg tablet Commonly known as:  COUMADIN Take 1 mg by mouth daily. Brand name 1 mg Where to Get Your Medications Information on where to get these meds will be given to you by the nurse or doctor. ! Ask your nurse or doctor about these medications  
  clopidogrel 75 mg Tab  
 lisinopril 2.5 mg tablet  
 metoprolol succinate 25 mg XL tablet  
 nitroglycerin 0.4 mg SL tablet

## 2018-09-19 NOTE — H&P
[address] Cardiology History & Physical  
  
Date of  Admission: 9/19/2018  3:13 PM  
 
Primary Care Physician: Dr. Omega Chu Primary Cardiologist: Dr. Paris King Referring Physician: Dr. Tootie Jefferson Admitting Physician: Dr. Garry Hood 
 
CC: Chest pain HPI:  Sherie Enciso is a 68 y.o. WM with h/o CAD s/p CABG (1996 with SVG-LAD and dRCA), AS s/p ST Sigifredo mechanical AVR, tobacco abuse/COPD, HTN, dyslipidemia, CVA 2004 and hypothyroidism who developed sudden onset of chest pain this AM. He described heaviness, aching chest pain with radiation to bilateral arms and back with associated SOB and diaphoresis but no palpitations, dizziness, N/V or syncope. He took 2 SL NTG that wouldn't dissolve under his tongue. He called EMS and was given additional SL NTG. ECG in route showed ST depression anterior lateral which resolved with NTG and he was CP free in ER. ECG in ER showed resolution of ST depression. He is trying to cut down on cigarettes with decreased recently to 1 PPD and 4 days ago he stopped smoking. Past Medical History:  
Diagnosis Date  Aortic stenosis 10/22/2015  Chest pain 10/22/2015  COPD (chronic obstructive pulmonary disease) (HCC)  Coronary artery disease  Coronary atherosclerosis of native coronary artery 10/22/2015  CVA (cerebrovascular accident) ([facility] [address] 2004  
 right hemispheric  Essential hypertension 10/22/2015  Mixed hyperlipidemia 10/22/2015  Tobacco use disorder 10/22/2015 Past Surgical History:  
Procedure Laterality Date [facility] Allergies Allergen Reactions  Advair Diskus [Fluticasone-Salmeterol] Unknown (comments) Social History Social History  Marital status:  Spouse name: N/A  
 Number of children: N/A  
 Years of education: N/A Occupational History  Not on file. Social History Main Topics  Smoking status: Current Every Day Smoker  Smokeless tobacco: Never Used Comment: trying to quit  Alcohol use No  
 Drug use: Not on file  Sexual activity: Not on file Other Topics Concern  Not on file Social History Narrative History reviewed. No pertinent family history. No current facility-administered medications for this encounter. Current Outpatient Prescriptions Medication Sig  warfarin (COUMADIN) 1 mg tablet Take 1 mg by mouth daily. Take as directed  potassium 99 mg tablet Take 99 mg by mouth daily.  MAGNESIUM PO Take  by mouth.  tiotropium (SPIRIVA) 18 mcg inhalation capsule Take 1 Cap by inhalation daily.  tamsulosin (FLOMAX) 0.4 mg capsule Take 0.4 mg by mouth daily.  topiramate (TOPAMAX) 100 mg tablet Take  by mouth two (2) times a day.  venlafaxine (EFFEXOR) 75 mg tablet Take 25 mg by mouth three (3) times daily.  VIT A/C/E AC/ZNOX/CUPRIC OXIDE (EYE VITAMIN AND MINERALS PO) Take  by mouth.  aspirin delayed-release 81 mg tablet Take  by mouth daily.  atorvastatin (LIPITOR) 40 mg tablet Take  by mouth daily.  Dexlansoprazole 60 mg CpDB Take  by mouth.  HYDROcodone-acetaminophen (NORCO) 7.5-325 mg per tablet Take  by mouth.  isosorbide mononitrate ER (IMDUR) 30 mg tablet Take  by mouth daily.  levothyroxine (SYNTHROID) 25 mcg tablet Take  by mouth Daily (before breakfast).  LORazepam (ATIVAN) 1 mg tablet Take  by mouth every four (4) hours as needed for Anxiety. Review of symptoms: 
General: no recent weight loss/gain, weakness, +fatigue, fever or chills Skin: no rashes, lumps, or other skin changes HEENT: no headache, dizziness, lightheadedness, vision changes, hearing changes, tinnitus, vertigo, sinus pressure/pain, bleeding gums, sore throat, or hoarseness Neck: no swollen glands, goiter, pain or stiffness Respiratory: no cough, sputum, hemoptysis, +dyspnea, +wheezing Cardiovascular: +chest pain or discomfort, no alpitations, +dyspnea, no orthopnea, paroxysmal nocturnal dyspnea, peripheral edema Gastrointestinal: no trouble swallowing, heartburn, change of appetite, nausea, change in bowel habits, pain with defecation, rectal bleeding or black/tarry stools, hemorrhoids, constipation, diarrhea, abdominal pain, jaundice, liver or gallbladder problems Urinary: no frequency, urgency , hematuria, burning/pain with urination, recent flank pain, polyuria, nocturia, or difficulty urinating Peripheral Vascular: no claudication, leg cramps, prior DVTs, swelling of calves, legs, or feet, color change, or swelling with redness or tenderness Musculoskeletal: no muscle or joint pain/stiffness, joint swelling, erythema of joints, or back pain Psychiatric: no depression, mental disorders, or excessive stress Neurological: +history of CVA, no dizziness, no sensory or motor loss, seizures, syncope, tremors, numbness, tingling, no changes in mood, attention, or speech, no changes in orientation, memory, insight, or judgment. no headache, vertigo. Hematologic: no anemia, easy bruising or bleeding Endocrine: no diabetes, +hypothyroidism, heat or cold intolerance, excessive sweating, polyuria, polydipsia Subjective:  
Physical Exam 
 
Visit Vitals  /65  Pulse 79  Temp 97.7 °F (36.5 °C)  Resp 18  Ht 5' 8\" (1.727 m)  Wt 90.7 kg (200 lb)  SpO2 97%  BMI 30.41 kg/m2 General Appearance:  Well developed, well nourished,alert and oriented x 3, and individual in no acute distress. Ears/Nose/Mouth/Throat:   Hearing grossly normal. 
  
    Neck: Supple. Chest:   Lungs wheezing bilaterally. Cardiovascular:  Regular rate and rhythm, S1, S2, +click Abdomen:   Soft, non-tender, bowel sounds are active. Extremities: No edema bilaterally. Skin: Warm and dry. Cardiographics Telemetry: ST depression on EMS  
ECG: NSR 77 bpm NSSTTW changes Echocardiogram: pending Labs: Recent Results (from the past 24 hour(s)) EKG, 12 LEAD, INITIAL Collection Time: 09/19/18  3:18 PM  
Result Value Ref Range Ventricular Rate 77 BPM  
 Atrial Rate 77 BPM  
 P-R Interval 120 ms QRS Duration 86 ms  
 Q-T Interval 356 ms  
 QTC Calculation (Bezet) 402 ms Calculated P Axis 67 degrees Calculated R Axis 36 degrees Calculated T Axis 89 degrees Diagnosis    
  !! AGE AND GENDER SPECIFIC ECG ANALYSIS !! Sinus rhythm with Premature supraventricular complexes Cannot rule out Anterior infarct , age undetermined Abnormal ECG No previous ECGs available METABOLIC PANEL, COMPREHENSIVE Collection Time: 09/19/18  3:34 PM  
Result Value Ref Range Sodium 145 136 - 145 mmol/L Potassium 3.9 3.5 - 5.1 mmol/L Chloride 113 (H) 98 - 107 mmol/L  
 CO2 25 21 - 32 mmol/L Anion gap 7 7 - 16 mmol/L Glucose 96 65 - 100 mg/dL BUN 18 8 - 23 MG/DL Creatinine 1.18 0.8 - 1.5 MG/DL  
 GFR est AA >60 >60 ml/min/1.73m2 GFR est non-AA >60 >60 ml/min/1.73m2 Calcium 8.0 (L) 8.3 - 10.4 MG/DL Bilirubin, total 0.6 0.2 - 1.1 MG/DL  
 ALT (SGPT) 31 12 - 65 U/L  
 AST (SGOT) 28 15 - 37 U/L Alk. phosphatase 144 (H) 50 - 136 U/L Protein, total 6.3 6.3 - 8.2 g/dL Albumin 3.1 (L) 3.2 - 4.6 g/dL Globulin 3.2 2.3 - 3.5 g/dL A-G Ratio 1.0 (L) 1.2 - 3.5    
CBC WITH AUTOMATED DIFF Collection Time: 09/19/18  3:34 PM  
Result Value Ref Range WBC 13.2 (H) 4.3 - 11.1 K/uL  
 RBC 4.60 4.23 - 5.6 M/uL  
 HGB 14.4 13.6 - 17.2 g/dL HCT 44.5 41.1 - 50.3 % MCV 96.7 79.6 - 97.8 FL  
 MCH 31.3 26.1 - 32.9 PG  
 MCHC 32.4 31.4 - 35.0 g/dL  
 RDW 13.6 % PLATELET 384 691 - 679 K/uL MPV 10.3 9.4 - 12.3 FL ABSOLUTE NRBC 0.00 0.0 - 0.2 K/uL  
 DF AUTOMATED NEUTROPHILS 79 (H) 43 - 78 % LYMPHOCYTES 11 (L) 13 - 44 % MONOCYTES 7 4.0 - 12.0 % EOSINOPHILS 1 0.5 - 7.8 % BASOPHILS 1 0.0 - 2.0 % IMMATURE GRANULOCYTES 2 0.0 - 5.0 % ABS. NEUTROPHILS 10.4 (H) 1.7 - 8.2 K/UL  
 ABS. LYMPHOCYTES 1.4 0.5 - 4.6 K/UL  
 ABS. MONOCYTES 1.0 0.1 - 1.3 K/UL  
 ABS. EOSINOPHILS 0.1 0.0 - 0.8 K/UL  
 ABS. BASOPHILS 0.1 0.0 - 0.2 K/UL  
 ABS. IMM. GRANS. 0.3 0.0 - 0.5 K/UL PROTHROMBIN TIME + INR Collection Time: 09/19/18  3:34 PM  
Result Value Ref Range Prothrombin time 24.2 (H) 11.5 - 14.5 sec INR 2.3 POC TROPONIN-I Collection Time: 09/19/18  3:36 PM  
Result Value Ref Range Troponin-I (POC) 0.23 (H) 0.02 - 0.05 ng/ml Pt has been seen and examined by Dr. Harleen Donald and he agrees with the following assessment and plan: 
 
 Assessment/Plan:  
   
 Diagnosis  NSTEMI (non-ST elevated myocardial infarction)- admit to telemetry, ASA, no BB with COPD/wheezing, add low dose ACE-I, continue statin and Imdur, add heparin when INR<2.0, plan LHC in AM if INR ok, check echo  CAD (coronary atherosclerosis of native coronary artery) S/P CABG x 2 (SVG-LAD and dRCA 1996)- ASA, no BB with wheezing, continue statin, Imdur  Mixed hyperlipidemia- statin  COPD- inhalers, nebs as needed  Aortic stenosis S/P AVR (aortic valve replacement)- St Sigifredo mechanical valve 1996- on chronic AC with coumadin INR 2.3  Tobacco use disorder- smoking cessation encouraged  Hypothyroidism- continue home dose synthroid Christina Wilhelm PA-C

## 2018-09-19 NOTE — PROGRESS NOTES
TRANSFER - IN REPORT: 
 
Verbal report received from [facility], RN(name) on Shelbi First  being received from ED(unit) for routine progression of care Report consisted of patients Situation, Background, Assessment and  
Recommendations(SBAR). Information from the following report(s) SBAR, Kardex, [facility] and Recent Results was reviewed with the receiving nurse. Opportunity for questions and clarification was provided.

## 2018-09-20 LAB
ANION GAP SERPL CALC-SCNC: 9 MMOL/L (ref 7–16)
APTT PPP: 118 SEC (ref 23.2–35.3)
APTT PPP: 145.9 SEC (ref 23.2–35.3)
ARTERIAL PATENCY WRIST A: ABNORMAL
ATRIAL RATE: 72 BPM
BASE DEFICIT BLDA-SCNC: 5.7 MMOL/L (ref 0–2)
BDY SITE: ABNORMAL
BUN SERPL-MCNC: 19 MG/DL (ref 8–23)
CALCIUM SERPL-MCNC: 7.9 MG/DL (ref 8.3–10.4)
CALCULATED P AXIS, ECG09: 47 DEGREES
CALCULATED R AXIS, ECG10: 8 DEGREES
CALCULATED T AXIS, ECG11: 65 DEGREES
CHLORIDE SERPL-SCNC: 113 MMOL/L (ref 98–107)
CHOLEST SERPL-MCNC: 104 MG/DL
CO2 SERPL-SCNC: 20 MMOL/L (ref 21–32)
COHGB MFR BLD: 0.2 % (ref 0.5–1.5)
CREAT SERPL-MCNC: 1.01 MG/DL (ref 0.8–1.5)
DIAGNOSIS, 93000: NORMAL
DO-HGB BLD-MCNC: 3 % (ref 0–5)
ERYTHROCYTE [DISTWIDTH] IN BLOOD BY AUTOMATED COUNT: 13.4 %
GAS FLOW.O2 O2 DELIVERY SYS: 4 L/MIN
GLUCOSE SERPL-MCNC: 94 MG/DL (ref 65–100)
HCO3 BLDA-SCNC: 18 MMOL/L (ref 22–26)
HCT VFR BLD AUTO: 42.1 % (ref 41.1–50.3)
HDLC SERPL-MCNC: 34 MG/DL (ref 40–60)
HDLC SERPL: 3.1 {RATIO}
HGB BLD-MCNC: 13.7 G/DL (ref 13.6–17.2)
HGB BLDMV-MCNC: 14 GM/DL (ref 11.7–15)
INR PPP: 2.3
LDLC SERPL CALC-MCNC: 47.2 MG/DL
LIPID PROFILE,FLP: ABNORMAL
MCH RBC QN AUTO: 31.1 PG (ref 26.1–32.9)
MCHC RBC AUTO-ENTMCNC: 32.5 G/DL (ref 31.4–35)
MCV RBC AUTO: 95.7 FL (ref 79.6–97.8)
METHGB MFR BLD: 0.3 % (ref 0–1.5)
NRBC # BLD: 0 K/UL (ref 0–0.2)
OXYHGB MFR BLDA: 96.7 % (ref 94–97)
P-R INTERVAL, ECG05: 174 MS
PCO2 BLDA: 28 MMHG (ref 35–45)
PH BLDA: 7.41 [PH] (ref 7.35–7.45)
PLATELET # BLD AUTO: 222 K/UL (ref 150–450)
PMV BLD AUTO: 10 FL (ref 9.4–12.3)
PO2 BLDA: 99 MMHG (ref 80–105)
POTASSIUM SERPL-SCNC: 3.9 MMOL/L (ref 3.5–5.1)
PROTHROMBIN TIME: 23.9 SEC (ref 11.5–14.5)
Q-T INTERVAL, ECG07: 416 MS
QRS DURATION, ECG06: 94 MS
QTC CALCULATION (BEZET), ECG08: 455 MS
RBC # BLD AUTO: 4.4 M/UL (ref 4.23–5.6)
SAO2 % BLD: 97 % (ref 92–98.5)
SODIUM SERPL-SCNC: 142 MMOL/L (ref 136–145)
TRIGL SERPL-MCNC: 114 MG/DL (ref 35–150)
TROPONIN I SERPL-MCNC: 2 NG/ML (ref 0.02–0.05)
TROPONIN I SERPL-MCNC: 2.85 NG/ML (ref 0.02–0.05)
VENTILATION MODE VENT: ABNORMAL
VENTRICULAR RATE, ECG03: 72 BPM
VLDLC SERPL CALC-MCNC: 22.8 MG/DL (ref 6–23)
WBC # BLD AUTO: 11.5 K/UL (ref 4.3–11.1)

## 2018-09-20 PROCEDURE — 74011000258 HC RX REV CODE- 258: Performed by: INTERNAL MEDICINE

## 2018-09-20 PROCEDURE — 65660000000 HC RM CCU STEPDOWN

## 2018-09-20 PROCEDURE — 92928 PRQ TCAT PLMT NTRAC ST 1 LES: CPT

## 2018-09-20 PROCEDURE — 93005 ELECTROCARDIOGRAM TRACING: CPT | Performed by: INTERNAL MEDICINE

## 2018-09-20 PROCEDURE — 77030013140 HC MSK NEB VYRM -A

## 2018-09-20 PROCEDURE — 85730 THROMBOPLASTIN TIME PARTIAL: CPT

## 2018-09-20 PROCEDURE — B2111ZZ FLUOROSCOPY OF MULTIPLE CORONARY ARTERIES USING LOW OSMOLAR CONTRAST: ICD-10-PCS | Performed by: INTERNAL MEDICINE

## 2018-09-20 PROCEDURE — 4A023N7 MEASUREMENT OF CARDIAC SAMPLING AND PRESSURE, LEFT HEART, PERCUTANEOUS APPROACH: ICD-10-PCS | Performed by: INTERNAL MEDICINE

## 2018-09-20 PROCEDURE — 84484 ASSAY OF TROPONIN QUANT: CPT

## 2018-09-20 PROCEDURE — 76937 US GUIDE VASCULAR ACCESS: CPT

## 2018-09-20 PROCEDURE — 99152 MOD SED SAME PHYS/QHP 5/>YRS: CPT

## 2018-09-20 PROCEDURE — 93455 CORONARY ART/GRFT ANGIO S&I: CPT

## 2018-09-20 PROCEDURE — B2131ZZ FLUOROSCOPY OF MULTIPLE CORONARY ARTERY BYPASS GRAFTS USING LOW OSMOLAR CONTRAST: ICD-10-PCS | Performed by: INTERNAL MEDICINE

## 2018-09-20 PROCEDURE — 74011250637 HC RX REV CODE- 250/637: Performed by: INTERNAL MEDICINE

## 2018-09-20 PROCEDURE — 92937 PRQ TRLUML REVSC CAB GRF 1: CPT

## 2018-09-20 PROCEDURE — 85027 COMPLETE CBC AUTOMATED: CPT

## 2018-09-20 PROCEDURE — 94640 AIRWAY INHALATION TREATMENT: CPT

## 2018-09-20 PROCEDURE — 74011250637 HC RX REV CODE- 250/637: Performed by: NURSE PRACTITIONER

## 2018-09-20 PROCEDURE — 36415 COLL VENOUS BLD VENIPUNCTURE: CPT

## 2018-09-20 PROCEDURE — 74011000250 HC RX REV CODE- 250: Performed by: INTERNAL MEDICINE

## 2018-09-20 PROCEDURE — 82803 BLOOD GASES ANY COMBINATION: CPT

## 2018-09-20 PROCEDURE — 85610 PROTHROMBIN TIME: CPT

## 2018-09-20 PROCEDURE — 74011250636 HC RX REV CODE- 250/636: Performed by: INTERNAL MEDICINE

## 2018-09-20 PROCEDURE — 027137Z DILATION OF CORONARY ARTERY, TWO ARTERIES WITH FOUR OR MORE DRUG-ELUTING INTRALUMINAL DEVICES, PERCUTANEOUS APPROACH: ICD-10-PCS | Performed by: INTERNAL MEDICINE

## 2018-09-20 PROCEDURE — 80061 LIPID PANEL: CPT

## 2018-09-20 PROCEDURE — B2151ZZ FLUOROSCOPY OF LEFT HEART USING LOW OSMOLAR CONTRAST: ICD-10-PCS | Performed by: INTERNAL MEDICINE

## 2018-09-20 PROCEDURE — 94760 N-INVAS EAR/PLS OXIMETRY 1: CPT

## 2018-09-20 PROCEDURE — 99153 MOD SED SAME PHYS/QHP EA: CPT

## 2018-09-20 PROCEDURE — 74011250636 HC RX REV CODE- 250/636

## 2018-09-20 PROCEDURE — C8929 TTE W OR WO FOL WCON,DOPPLER: HCPCS

## 2018-09-20 PROCEDURE — 80048 BASIC METABOLIC PNL TOTAL CA: CPT

## 2018-09-20 RX ORDER — CLOPIDOGREL BISULFATE 75 MG/1
600 TABLET ORAL ONCE
Status: COMPLETED | OUTPATIENT
Start: 2018-09-20 | End: 2018-09-20

## 2018-09-20 RX ORDER — SODIUM CHLORIDE 0.9 % (FLUSH) 0.9 %
5-10 SYRINGE (ML) INJECTION AS NEEDED
Status: DISCONTINUED | OUTPATIENT
Start: 2018-09-20 | End: 2018-09-21 | Stop reason: HOSPADM

## 2018-09-20 RX ORDER — WARFARIN 1 MG/1
1 TABLET ORAL
Status: DISCONTINUED | OUTPATIENT
Start: 2018-09-21 | End: 2018-09-21 | Stop reason: HOSPADM

## 2018-09-20 RX ORDER — ACETAMINOPHEN 325 MG/1
650 TABLET ORAL
Status: DISCONTINUED | OUTPATIENT
Start: 2018-09-20 | End: 2018-09-21 | Stop reason: HOSPADM

## 2018-09-20 RX ORDER — SODIUM CHLORIDE 9 MG/ML
75 INJECTION, SOLUTION INTRAVENOUS CONTINUOUS
Status: DISCONTINUED | OUTPATIENT
Start: 2018-09-20 | End: 2018-09-21 | Stop reason: HOSPADM

## 2018-09-20 RX ORDER — CLOPIDOGREL BISULFATE 75 MG/1
75 TABLET ORAL DAILY
Status: DISCONTINUED | OUTPATIENT
Start: 2018-09-21 | End: 2018-09-21 | Stop reason: HOSPADM

## 2018-09-20 RX ORDER — NITROGLYCERIN 0.4 MG/1
0.4 TABLET SUBLINGUAL
Status: DISCONTINUED | OUTPATIENT
Start: 2018-09-20 | End: 2018-09-21 | Stop reason: HOSPADM

## 2018-09-20 RX ORDER — LORAZEPAM 1 MG/1
1 TABLET ORAL
Status: DISCONTINUED | OUTPATIENT
Start: 2018-09-20 | End: 2018-09-21 | Stop reason: HOSPADM

## 2018-09-20 RX ORDER — ALBUTEROL SULFATE 0.83 MG/ML
2.5 SOLUTION RESPIRATORY (INHALATION) AS NEEDED
Status: DISCONTINUED | OUTPATIENT
Start: 2018-09-20 | End: 2018-09-21 | Stop reason: HOSPADM

## 2018-09-20 RX ORDER — LIDOCAINE HYDROCHLORIDE 10 MG/ML
6 INJECTION INFILTRATION; PERINEURAL ONCE
Status: COMPLETED | OUTPATIENT
Start: 2018-09-20 | End: 2018-09-20

## 2018-09-20 RX ORDER — SODIUM CHLORIDE 0.9 % (FLUSH) 0.9 %
5-10 SYRINGE (ML) INJECTION EVERY 8 HOURS
Status: DISCONTINUED | OUTPATIENT
Start: 2018-09-20 | End: 2018-09-21 | Stop reason: HOSPADM

## 2018-09-20 RX ORDER — HEPARIN SODIUM 200 [USP'U]/100ML
3 INJECTION, SOLUTION INTRAVENOUS CONTINUOUS
Status: DISCONTINUED | OUTPATIENT
Start: 2018-09-20 | End: 2018-09-21 | Stop reason: HOSPADM

## 2018-09-20 RX ORDER — FENTANYL CITRATE 50 UG/ML
25-50 INJECTION, SOLUTION INTRAMUSCULAR; INTRAVENOUS
Status: DISCONTINUED | OUTPATIENT
Start: 2018-09-20 | End: 2018-09-21 | Stop reason: HOSPADM

## 2018-09-20 RX ORDER — HYDROCODONE BITARTRATE AND ACETAMINOPHEN 5; 325 MG/1; MG/1
1 TABLET ORAL
Status: DISCONTINUED | OUTPATIENT
Start: 2018-09-20 | End: 2018-09-21 | Stop reason: HOSPADM

## 2018-09-20 RX ORDER — MIDAZOLAM HYDROCHLORIDE 1 MG/ML
.5-2 INJECTION, SOLUTION INTRAMUSCULAR; INTRAVENOUS
Status: DISCONTINUED | OUTPATIENT
Start: 2018-09-20 | End: 2018-09-21 | Stop reason: HOSPADM

## 2018-09-20 RX ORDER — MAG HYDROX/ALUMINUM HYD/SIMETH 200-200-20
30 SUSPENSION, ORAL (FINAL DOSE FORM) ORAL
Status: DISCONTINUED | OUTPATIENT
Start: 2018-09-20 | End: 2018-09-21 | Stop reason: HOSPADM

## 2018-09-20 RX ORDER — DIPHENHYDRAMINE HCL 25 MG
25 CAPSULE ORAL
Status: DISCONTINUED | OUTPATIENT
Start: 2018-09-20 | End: 2018-09-21 | Stop reason: HOSPADM

## 2018-09-20 RX ADMIN — Medication 10 ML: at 21:51

## 2018-09-20 RX ADMIN — ALBUTEROL SULFATE 2.5 MG: 2.5 SOLUTION RESPIRATORY (INHALATION) at 17:01

## 2018-09-20 RX ADMIN — Medication 400 MG: at 08:57

## 2018-09-20 RX ADMIN — MIDAZOLAM HYDROCHLORIDE 2 MG: 1 INJECTION, SOLUTION INTRAMUSCULAR; INTRAVENOUS at 14:55

## 2018-09-20 RX ADMIN — TOPIRAMATE 100 MG: 100 TABLET, FILM COATED ORAL at 08:57

## 2018-09-20 RX ADMIN — ALBUTEROL SULFATE 2.5 MG: 2.5 SOLUTION RESPIRATORY (INHALATION) at 20:40

## 2018-09-20 RX ADMIN — ISOSORBIDE MONONITRATE 30 MG: 30 TABLET, EXTENDED RELEASE ORAL at 08:55

## 2018-09-20 RX ADMIN — BIVALIRUDIN 1.75 MG/KG/HR: 250 INJECTION, POWDER, LYOPHILIZED, FOR SOLUTION INTRAVENOUS at 14:58

## 2018-09-20 RX ADMIN — MORPHINE SULFATE 2 MG: 2 INJECTION, SOLUTION INTRAMUSCULAR; INTRAVENOUS at 17:55

## 2018-09-20 RX ADMIN — BIVALIRUDIN 1.75 MG/KG/HR: 250 INJECTION, POWDER, LYOPHILIZED, FOR SOLUTION INTRAVENOUS at 15:37

## 2018-09-20 RX ADMIN — VENLAFAXINE 25 MG: 25 TABLET ORAL at 21:51

## 2018-09-20 RX ADMIN — TAMSULOSIN HYDROCHLORIDE 0.4 MG: 0.4 CAPSULE ORAL at 08:58

## 2018-09-20 RX ADMIN — MIDAZOLAM HYDROCHLORIDE 2 MG: 1 INJECTION, SOLUTION INTRAMUSCULAR; INTRAVENOUS at 14:28

## 2018-09-20 RX ADMIN — PANTOPRAZOLE SODIUM 40 MG: 40 TABLET, DELAYED RELEASE ORAL at 06:22

## 2018-09-20 RX ADMIN — HEPARIN SODIUM 2 ML: 10000 INJECTION, SOLUTION INTRAVENOUS; SUBCUTANEOUS at 14:36

## 2018-09-20 RX ADMIN — LEVOTHYROXINE SODIUM 25 MCG: 50 TABLET ORAL at 06:23

## 2018-09-20 RX ADMIN — DIPHENHYDRAMINE HYDROCHLORIDE 25 MG: 25 CAPSULE ORAL at 21:51

## 2018-09-20 RX ADMIN — LISINOPRIL 2.5 MG: 5 TABLET ORAL at 09:00

## 2018-09-20 RX ADMIN — ASPIRIN 81 MG: 81 TABLET, COATED ORAL at 08:58

## 2018-09-20 RX ADMIN — FENTANYL CITRATE 50 MCG: 50 INJECTION, SOLUTION INTRAMUSCULAR; INTRAVENOUS at 14:28

## 2018-09-20 RX ADMIN — HEPARIN SODIUM 3 ML/HR: 200 INJECTION, SOLUTION INTRAVENOUS at 14:36

## 2018-09-20 RX ADMIN — ALUMINUM HYDROXIDE, MAGNESIUM HYDROXIDE, AND SIMETHICONE 30 ML: 200; 200; 20 SUSPENSION ORAL at 16:21

## 2018-09-20 RX ADMIN — Medication 10 ML: at 07:10

## 2018-09-20 RX ADMIN — MIDAZOLAM HYDROCHLORIDE 1 MG: 1 INJECTION, SOLUTION INTRAMUSCULAR; INTRAVENOUS at 15:47

## 2018-09-20 RX ADMIN — FENTANYL CITRATE 25 MCG: 50 INJECTION, SOLUTION INTRAMUSCULAR; INTRAVENOUS at 15:49

## 2018-09-20 RX ADMIN — CLOPIDOGREL BISULFATE 600 MG: 75 TABLET ORAL at 16:19

## 2018-09-20 RX ADMIN — HYDROCODONE BITARTRATE AND ACETAMINOPHEN 1 TABLET: 5; 325 TABLET ORAL at 22:36

## 2018-09-20 RX ADMIN — LIDOCAINE HYDROCHLORIDE 6 ML: 10 INJECTION, SOLUTION INFILTRATION; PERINEURAL at 14:35

## 2018-09-20 RX ADMIN — HYDROCODONE BITARTRATE AND ACETAMINOPHEN 1 TABLET: 7.5; 325 TABLET ORAL at 03:35

## 2018-09-20 RX ADMIN — DIPHENHYDRAMINE HYDROCHLORIDE 25 MG: 25 CAPSULE ORAL at 03:30

## 2018-09-20 RX ADMIN — PERFLUTREN 1 ML: 6.52 INJECTION, SUSPENSION INTRAVENOUS at 07:00

## 2018-09-20 RX ADMIN — SODIUM CHLORIDE 75 ML/HR: 900 INJECTION, SOLUTION INTRAVENOUS at 17:53

## 2018-09-20 RX ADMIN — ALBUTEROL SULFATE 2.5 MG: 2.5 SOLUTION RESPIRATORY (INHALATION) at 15:35

## 2018-09-20 RX ADMIN — ATORVASTATIN CALCIUM 40 MG: 40 TABLET, FILM COATED ORAL at 21:51

## 2018-09-20 RX ADMIN — TIOTROPIUM BROMIDE 18 MCG: 18 CAPSULE ORAL; RESPIRATORY (INHALATION) at 07:06

## 2018-09-20 RX ADMIN — Medication 10 ML: at 22:37

## 2018-09-20 RX ADMIN — Medication 10 ML: at 17:49

## 2018-09-20 NOTE — PROCEDURES
[address] Anat Smith  MR#: [id number]  : 1945  ACCOUNT #: [de-identified]   DATE OF SERVICE: 2018    PRIMARY CARDIOLOGIST:  Dr. Eugene Lema:  Dr. Alexandra Pratt    BRIEF HISTORY:  The patient is a 71-year-old gentleman with history of remote coronary bypass grafting and mechanical aortic valve implantation in . Has saphenous vein graft to the LAD and saphenous vein graft to the right coronary artery. He was admitted with a non-ST elevation myocardial infarction and referred for cardiac catheterization. DESCRIPTION OF PROCEDURE:  He was prepped and draped in usual sterile fashion. The left wrist was infiltrated with lidocaine. Left radial artery was accessed via micropuncture technique and a 6-Thai sheath advanced. A 6-Thai JL5 was utilized for left coronary injection, 6-Thai JR5 utilized for right coronary injection. An AL2 was utilized for saphenous vein graft injection to the LAD and a multipurpose catheter was utilized for saphenous vein graft injection to the right coronary artery. The aortic valve was not crossed due to indwelling mechanical aortic valve. CONTRAST:  Isovue. CONSCIOUS SEDATION:  Start time 0, end time 200. MEDICATIONS:  5 mg of Versed and 75 mcg of fentanyl. MONITORING REGISTERED NURSE:  Donnajean Dakins    FINDINGS:    1. Left ventricle not done. 2.  Left main:  Heavily calcified 90% proximal stenosis, bifurcates into LAD and circumflex systems with a 50-60% distal stenosis. 3.  Left anterior descending coronary:  Flush occluded. 4.  Left circumflex: This is heavily calcified. There is a 50% mid lesion at the takeoff of the first diagonal then tandem 80% lesions in the distal circumflex and the OM2.  5.  Right coronary artery:  Small vessel has a 99% mid stenosis. 6.  Saphenous vein graft to the LAD.   This is a large graft, has an 80% calcified proximal stenosis and it fills a large LAD system well. 7.  Saphenous vein graft to the right coronary artery is a large graft, good proximal takeoff, good distal anastomosis, fills distal right coronary well. PERCUTANEOUS CORONARY INTERVENTION:  Lesion, left circumflex. Pre-stenosis 80%, post-stenosis 0%. LESION #2:  Left main pre-stenosis 90%, post-stenosis 0%. DETAILS:  The patient was anticoagulated with Angiomax. A 6-Bhutanese XB 4.0 guide was utilized. A [place] wire was advanced distally. The distal circumflex was dilated with 2.5 x 20 Euphora balloon in the left main was then dilated with a 3.0 x 10 [place] cutting balloon. In efforts to deliver stent distally we had a very poor guide backup. A GuideLiner was utilized and guide backup remained extremely poor and we transitioned to the femoral access. Due to poor radial support, the right groin was infiltrated under ultrasound guidance. Right femoral artery was accessed and a 6-Bhutanese sheath was advanced. A 6-Bhutanese XB 4.0 guide was utilized for intervention. Following access and guide placement, a [place] wire was advanced distally. Utilizing a GuideLiner, we were able to deliver a 2.5 x 38 Synergy rug-eluting stent through the distal left circumflex. The proximal circumflex appears to be disrupted from aggressive GuideLiner utilization and a 3.0 x 38 Synergy drug-eluting stent was utilized in a contiguous fashion into the proximal left main. Following this, a 3.0 x 12 Synergy drug-eluting stent was positioned out to the left main ostium, all in overlapping fashion. The entire portions of the mid distal and proximal postdilated with high pressures of a 3.0 mm noncompliant balloon. This yielded an excellent angiographic result. The wire was removed. Orthogonal views were obtained. LESION #3:  Proximal body vein graft to the LAD. Pre-stenosis 80%, post-stenosis 0%. DETAILS:  The patient was anticoagulated with Angiomax.   A 6-Ch[place] AL2 guide was utilized. Whisper wire was advanced distally. A 6-0 spider wire was deployed in the distal body of the vein graft and a Whisper wire was removed. Over the spider wire, a 5.0 x 22 [place] drug-eluting stent was positioned out to the ostium and inflated up to 20 atmospheres, postdilated at high pressures with a 5 mm noncompliant balloon. This yielded an excellent angiographic result. The spider wire was retracted into the retrieval device and repeat imaging demonstrates excellent stent apposition, HERNANDO 3 flow to the entire vein graft and LAD systems. CONCLUSION:    1. Successful hemostasis right groin access site with Angio-Seal closure device. 2.  Successful pneumatic radial band placed to the left radial access site. 3.  600 mg of clopidogrel administered in the lab. 4.  The patient will be managed with warfarin and clopidogrel only. Aspirin will be stopped when INR is documented to be above 2.0. Thank you for allowing us to participate in the care of this patient. CONCLUSION:  1. Successful PCI and stenting of the left main, left circumflex with Synergy drug-eluting stent x 3.  2.  Successful PCI and stenting of the proximal vein graft to the LAD with an [place]i drug-eluting stent. Patent vein graft to distal right coronary artery. Thank you for allowing us to participate in the care of this patient. Any questions or concerns, please feel free to contact me.       MD MATTI Saunders / [place]  D: 09/20/2018 16:35     T: 09/20/2018 17:03  JOB #: [id number]  CC: Shirin Manning MD  CC: Cory Gimenez MD  [address]. Sofia Dowd [address] [place] [address]

## 2018-09-20 NOTE — PROGRESS NOTES
Bedside and Verbal shift change report given to self (oncoming nurse) by Miguel Garcia RN (offgoing nurse). Report included the following information SBAR, Kardex, MAR and Recent Results. TR band to right wrist 2 ml removed. Groin site visualized with offgoing RN.

## 2018-09-20 NOTE — PROGRESS NOTES
[facility] CARDIOLOGY PROGRESS NOTE 
      
 
9/20/2018 8:35 AM 
 
Admit Date: 9/19/2018 Subjective:  
Patient with NSTEMI and mAVR with prior CABG. No CP currently. ROS: 
Cardiovascular:  As noted above Objective:  
  
Vitals:  
 09/20/18 0106 09/20/18 5639 09/20/18 6329 09/20/18 0105 BP: 113/65 105/70  110/64 Pulse: 66 (!) 54  (!) 57 Resp: 18 18 18 Temp: 97.9 °F (36.6 °C) 97.7 °F (36.5 °C)  97.7 °F (36.5 °C) SpO2: 98% 95% 97% 97% Weight:      
Height:      
 
 
Physical Exam: 
General-No Acute Distress Neck- supple, no JVD 
CV- regular rate and rhythm (+) click Lung- clear bilaterally Abd- soft, nontender, nondistended Ext- no edema bilaterally. Skin- warm and dry Data Review:  
Recent Labs  
   09/20/18 
 0432  09/20/18 
 0016   09/19/18 
 1534 NA  142   --    --   145  
K  3.9   --    --   3.9 BUN  19   --    --   18  
CREA  1.01   --    --   1.18  
GLU  94   --    --   96 WBC  11.5*   --    --   13.2* HGB  13.7   --    --   14.4 HCT  42.1   --    --   44.5 PLT  222   --    --   234 INR  2.3   --    --   2.3  
TROIQ  2.00*  2.85*   < >   --   
CHOL  104   --    --    --   
LDLC  47.2   --    --    --   
HDL  34*   --    --    --   
 < > = values in this interval not displayed. Assessment/Plan:  
 
Principal Problem: 
  NSTEMI (non-ST elevated myocardial infarction) ([facility] [address] (9/19/2018) Plan [facility] today. No CP currently Active Problems: Aortic stenosis (10/22/2015) S/P mAVR Coronary atherosclerosis of native coronary artery (10/22/2015) Prior CABG SVG-LAD and RCA Mixed hyperlipidemia (10/22/2015) On atorvastatin Tobacco use disorder (10/22/2015) Cessation education Essential hypertension (10/22/2015) This is stable S/P AVR (aortic valve replacement) (5/25/2016) Echo pending S/P CABG x 2 (9/19/2018) As above COPD (chronic obstructive pulmonary disease) ([address] [address] (9/19/2018) Stable Chucho Osorio MD 
9/20/2018 8:35 AM

## 2018-09-20 NOTE — PROCEDURES
Brief Cardiac Procedure Note    Patient: Bishop Varghese MRN: [id number]  SSN: [id number]    YOB: 1945  Age: 68 y.o. Sex: male      Date of Procedure: 9/20/2018     Pre-procedure Diagnosis: NSTEMI    Post-procedure Diagnosis: Coronary Artery Disease    Procedure: Left Heart Catheterization with Percutaneous Coronary Intervention    Brief Description of Procedure: See note    Performed By: Shasta Celaya MD     Assistants: None    Anesthesia: Moderate Sedation    Estimated Blood Loss: Less than 10 mL      Specimens: None    Implants: None    Findings:   LM:  90%  LAD:  100%  LCx:  Diffuse 50-80%  RCA:  99% mid  SVG-RCA:  Patent  SVG-LAD:  80% prox body    PCI LCx 80-0%   2.5x20 NC Euphora   2.5x38, 3.0x38 Synergy   3.0x20 NC Euphora    PCI LM:  90-0%   3.0x10 Lexa   3.0x12 Synergy   3.0x20 NC Euphora    PCI pBody SVG-LAD 80-0%   6.0mm Spider Wire   5.0x22 Abdullaih   5.0x12 NC [place]    600mg clopidogrel    Complications: None    Recommendations: Continue medical therapy.     Signed By: hSasta Celaya MD     September 20, 2018

## 2018-09-20 NOTE — PROGRESS NOTES
Bedside and Verbal shift change report given to Devyn (student nurse) and Alem Kolb RN (oncoming nurse) by Bahman Ochoa RN (offgoing nurse). Report included the following information SBAR, Kardex, MAR and Recent Results.

## 2018-09-20 NOTE — PROGRESS NOTES
TRANSFER - IN REPORT: 
 
Admission skin assessment completed with second RN and reveals the following: New skin abnormalities WDL: right groin site s/p LHC with gauze and Tegaderm intact without bleeding or hematoma. TR band with 12 cc air inflated, intact without bleeding or hematoma. No other new abnormality noted.  Unable to assess sacrum due to bedrest.

## 2018-09-20 NOTE — PROGRESS NOTES
Care Management Interventions PCP Verified by CM: Yes 
Palliative Care Criteria Met (RRAT>21 & CHF Dx)?: No (RRAT 16 Dx NSTEMI) Transition of Care Consult (CM Consult): Discharge Planning Discharge Durable Medical Equipment: No 
Physical Therapy Consult: No 
Occupational Therapy Consult: No 
Speech Therapy Consult: No 
Current Support Network: Lives with Spouse Confirm Follow Up Transport: Self Plan discussed with Pt/Family/Caregiver: Yes Freedom of Choice Offered: Yes Discharge Location Discharge Placement: Home Met with patient for d/c planning. Patient alert and oriented x 3, independent of ADL's and lives with his wife. He has a cane he uses for long distances. [facility] has [facility] and obtains medications. PCP is Dr. Adriana Tian. Patient for cardiac cath today. Current d/c plan is home with wife. CM following.

## 2018-09-20 NOTE — PROGRESS NOTES
Dr. Mayco Morfin notified of Troponin 2.5. Patient denies chest pain. New order received to start heparin gtt with no bolus.

## 2018-09-20 NOTE — PROGRESS NOTES
Bedside and Verbal shift change report given to josé luis RN (oncoming nurse) by self (offgoing nurse). Report included the following information SBAR, Kardex, MAR and Recent Results.

## 2018-09-20 NOTE — PROGRESS NOTES
TRANSFER - OUT REPORT: 
 
Verbal report given to Sirisha(name) on Antonio Womack  being transferred to cpru(unit) for routine progression of care Report consisted of patients Situation, Background, Assessment and  
Recommendations(SBAR). Information from the following report(s) SBAR was reviewed with the receiving nurse. Opportunity for questions and clarification was provided. Procedure: [facility]   Finding Summary: stent x 4 (3 to left main/circ) (1 to graft to LAD)(cath/pci/pacer settings) Location: [facility]    Closure Device: trband L wrist, 12ml. 6 F Angioseal(yes/no/description) Post Site Assessment: no bleeding no hematoma Intra Procedure Meds: 
 
Versed: 5mg Fentanyl: 75mcg Angiomax Stop Time: 56 Antiplatelet: 462CS Plavix 30ml Mylanta 2.5mg Albuterol Peripheral IV 09/19/18 Left Arm (Active) Site Assessment Clean, dry, & intact 9/20/2018 11:50 AM  
Phlebitis Assessment 0 9/20/2018 11:50 AM  
Infiltration Assessment 0 9/20/2018 11:50 AM  
Dressing Status Clean, dry, & intact 9/20/2018 11:50 AM  
Dressing Type Transparent;Tape 9/20/2018 11:50 AM  
Hub Color/Line Status Blue 9/20/2018 11:50 AM  
Alcohol Cap Used No 9/20/2018 11:50 AM  
   
Peripheral IV 09/20/18 Right;Upper Arm (Active) Site Assessment Clean, dry, & intact 9/20/2018 11:50 AM  
Phlebitis Assessment 0 9/20/2018 11:50 AM  
Infiltration Assessment 0 9/20/2018 11:50 AM  
Dressing Status Clean, dry, & intact 9/20/2018 11:50 AM  
Dressing Type Transparent;Tape 9/20/2018 11:50 AM  
Hub Color/Line Status Blue 9/20/2018 11:50 AM  
Alcohol Cap Used No 9/20/2018 11:50 AM  
 
  
Post-Procedure Site Assessment (1) Wound Type: Catheter entry/exit Location: Wrist 
Orientation : Left Hemostasis : TR Band (12) Site Assessment: No bleeding, No hematoma Post-Procedure Site Assessment (2) Wound Type: Catheter entry/exit Location : Groin Orientation : Right Closure Device : Angioseal 
 Site Assessment : No bleeding, No hematoma 
  
  
  
  
  
  
is allergic to advair diskus [fluticasone-salmeterol]. Past Medical History:  
Diagnosis Date  Aortic stenosis 10/22/2015  Chest pain 10/22/2015  COPD (chronic obstructive pulmonary disease) (HCC)  Coronary artery disease  Coronary atherosclerosis of native coronary artery 10/22/2015  CVA (cerebrovascular accident) ([facility] 2004  
 right hemispheric  Essential hypertension 10/22/2015  Mixed hyperlipidemia 10/22/2015  Tobacco use disorder 10/22/2015 Visit Vitals  /76 (BP Patient Position: At rest)  Pulse 70  Temp 97 °F (36.1 °C)  Resp 20  
 Ht 5' 8\" (1.727 m)  Wt 90.1 kg (198 lb 9.6 oz)  SpO2 99%  BMI 30.2 kg/m2

## 2018-09-20 NOTE — PROGRESS NOTES
Bedside and Verbal shift change report given to self (oncoming nurse) by Terrie Hurt RN (offgoing nurse). Report included the following information SBAR, Kardex, MAR and Recent Results.

## 2018-09-20 NOTE — PROGRESS NOTES
Patient transported to room 314. Left wrist and right groin assessed with Mary Alice RN. No bleeding or hematoma noted.

## 2018-09-20 NOTE — PROGRESS NOTES
Bedside and Verbal shift change report given to Comfort Wallace RN (oncoming nurse) by self Antonio ríos). Report included the following information SBAR, Kardex, MAR and Recent Results. Right radial site and right groin site assessed at bedside. 2 cc air removed from TR band per protocol. Intact without bleeding or hematoma. Pt's pain controlled.

## 2018-09-20 NOTE — PROGRESS NOTES
TRANSFER - IN REPORT: 
 
Verbal report received from Wing Caller, [facility] (name) on [place]  being received from Cath lab (unit) for routine post - op Report consisted of patients Situation, Background, Assessment and  
Recommendations(SBAR). Information from the following report(s) SBAR, Kardex, [facility] and Recent Results was reviewed with the receiving nurse. Opportunity for questions and clarification was provided.

## 2018-09-20 NOTE — PROGRESS NOTES
Problem: Falls - Risk of 
Goal: *Absence of Falls Document [facility] Fall Risk and appropriate interventions in the flowsheet. Fall Risk Interventions: 
 Medication Interventions: Evaluate medications/consider consulting pharmacy, Patient to call before getting OOB, Teach patient to arise slowly Pt has call light within reach, bed low and locked. Call before assistance Problem: Cath Lab Procedures: Post-Cath Day of Procedure (Initiate SCIP Measures for Post-Op Care) Goal: Treatments/Interventions/Procedures Outcome: Progressing Towards Goal 
Pt received back from cath lab. All procedural sites are in defined limits. Pt on 2L O2. Pain managed. Post cath care per protocol

## 2018-09-21 VITALS
TEMPERATURE: 98.2 F | HEIGHT: 68 IN | WEIGHT: 201.7 LBS | RESPIRATION RATE: 20 BRPM | BODY MASS INDEX: 30.57 KG/M2 | OXYGEN SATURATION: 97 % | HEART RATE: 77 BPM | DIASTOLIC BLOOD PRESSURE: 61 MMHG | SYSTOLIC BLOOD PRESSURE: 106 MMHG

## 2018-09-21 LAB
ANION GAP SERPL CALC-SCNC: 8 MMOL/L (ref 7–16)
ATRIAL RATE: 65 BPM
BASOPHILS # BLD: 0.1 K/UL (ref 0–0.2)
BASOPHILS NFR BLD: 1 % (ref 0–2)
BUN SERPL-MCNC: 15 MG/DL (ref 8–23)
CALCIUM SERPL-MCNC: 7.5 MG/DL (ref 8.3–10.4)
CALCULATED P AXIS, ECG09: 61 DEGREES
CALCULATED R AXIS, ECG10: 28 DEGREES
CALCULATED T AXIS, ECG11: 64 DEGREES
CHLORIDE SERPL-SCNC: 114 MMOL/L (ref 98–107)
CO2 SERPL-SCNC: 22 MMOL/L (ref 21–32)
CREAT SERPL-MCNC: 0.97 MG/DL (ref 0.8–1.5)
DIAGNOSIS, 93000: NORMAL
DIFFERENTIAL METHOD BLD: ABNORMAL
EOSINOPHIL # BLD: 0.1 K/UL (ref 0–0.8)
EOSINOPHIL NFR BLD: 1 % (ref 0.5–7.8)
ERYTHROCYTE [DISTWIDTH] IN BLOOD BY AUTOMATED COUNT: 13.7 %
GLUCOSE SERPL-MCNC: 93 MG/DL (ref 65–100)
HCT VFR BLD AUTO: 39.8 % (ref 41.1–50.3)
HGB BLD-MCNC: 13 G/DL (ref 13.6–17.2)
IMM GRANULOCYTES # BLD: 0.1 K/UL (ref 0–0.5)
IMM GRANULOCYTES NFR BLD AUTO: 1 % (ref 0–5)
INR PPP: 2.1
LYMPHOCYTES # BLD: 1.4 K/UL (ref 0.5–4.6)
LYMPHOCYTES NFR BLD: 14 % (ref 13–44)
MCH RBC QN AUTO: 31.5 PG (ref 26.1–32.9)
MCHC RBC AUTO-ENTMCNC: 32.7 G/DL (ref 31.4–35)
MCV RBC AUTO: 96.4 FL (ref 79.6–97.8)
MONOCYTES # BLD: 1.2 K/UL (ref 0.1–1.3)
MONOCYTES NFR BLD: 12 % (ref 4–12)
NEUTS SEG # BLD: 7.2 K/UL (ref 1.7–8.2)
NEUTS SEG NFR BLD: 72 % (ref 43–78)
NRBC # BLD: 0 K/UL (ref 0–0.2)
P-R INTERVAL, ECG05: 170 MS
PLATELET # BLD AUTO: 203 K/UL (ref 150–450)
PMV BLD AUTO: 9.8 FL (ref 9.4–12.3)
POTASSIUM SERPL-SCNC: 3.6 MMOL/L (ref 3.5–5.1)
PROTHROMBIN TIME: 22.6 SEC (ref 11.5–14.5)
Q-T INTERVAL, ECG07: 428 MS
QRS DURATION, ECG06: 92 MS
QTC CALCULATION (BEZET), ECG08: 445 MS
RBC # BLD AUTO: 4.13 M/UL (ref 4.23–5.6)
SODIUM SERPL-SCNC: 144 MMOL/L (ref 136–145)
VENTRICULAR RATE, ECG03: 65 BPM
WBC # BLD AUTO: 10.1 K/UL (ref 4.3–11.1)

## 2018-09-21 PROCEDURE — 85610 PROTHROMBIN TIME: CPT

## 2018-09-21 PROCEDURE — 36415 COLL VENOUS BLD VENIPUNCTURE: CPT

## 2018-09-21 PROCEDURE — 93005 ELECTROCARDIOGRAM TRACING: CPT | Performed by: INTERNAL MEDICINE

## 2018-09-21 PROCEDURE — 94640 AIRWAY INHALATION TREATMENT: CPT

## 2018-09-21 PROCEDURE — 94760 N-INVAS EAR/PLS OXIMETRY 1: CPT

## 2018-09-21 PROCEDURE — 74011250637 HC RX REV CODE- 250/637: Performed by: NURSE PRACTITIONER

## 2018-09-21 PROCEDURE — 80048 BASIC METABOLIC PNL TOTAL CA: CPT

## 2018-09-21 PROCEDURE — 85025 COMPLETE CBC W/AUTO DIFF WBC: CPT

## 2018-09-21 PROCEDURE — 74011250637 HC RX REV CODE- 250/637: Performed by: INTERNAL MEDICINE

## 2018-09-21 RX ORDER — METOPROLOL SUCCINATE 25 MG/1
25 TABLET, EXTENDED RELEASE ORAL DAILY
Qty: 30 TAB | Refills: 11 | Status: SHIPPED | OUTPATIENT
Start: 2018-09-22 | End: 2018-09-26

## 2018-09-21 RX ORDER — CLOPIDOGREL BISULFATE 75 MG/1
75 TABLET ORAL DAILY
Qty: 30 TAB | Refills: 11 | Status: SHIPPED | OUTPATIENT
Start: 2018-09-22 | End: 2018-10-26

## 2018-09-21 RX ORDER — GUAIFENESIN 100 MG/5ML
81 LIQUID (ML) ORAL DAILY
Status: DISCONTINUED | OUTPATIENT
Start: 2018-09-21 | End: 2018-09-21 | Stop reason: HOSPADM

## 2018-09-21 RX ORDER — LISINOPRIL 2.5 MG/1
2.5 TABLET ORAL DAILY
Qty: 30 TAB | Refills: 11 | Status: SHIPPED | OUTPATIENT
Start: 2018-09-22 | End: 2019-02-25 | Stop reason: SDUPTHER

## 2018-09-21 RX ORDER — NITROGLYCERIN 0.4 MG/1
0.4 TABLET SUBLINGUAL
Qty: 1 BOTTLE | Refills: 5 | Status: SHIPPED | OUTPATIENT
Start: 2018-09-21 | End: 2019-09-18 | Stop reason: SDUPTHER

## 2018-09-21 RX ORDER — METOPROLOL SUCCINATE 25 MG/1
25 TABLET, EXTENDED RELEASE ORAL DAILY
Status: DISCONTINUED | OUTPATIENT
Start: 2018-09-21 | End: 2018-09-21 | Stop reason: HOSPADM

## 2018-09-21 RX ADMIN — LISINOPRIL 2.5 MG: 5 TABLET ORAL at 08:15

## 2018-09-21 RX ADMIN — ASPIRIN 81 MG: 81 TABLET, CHEWABLE ORAL at 09:38

## 2018-09-21 RX ADMIN — CLOPIDOGREL BISULFATE 75 MG: 75 TABLET ORAL at 08:15

## 2018-09-21 RX ADMIN — TIOTROPIUM BROMIDE 18 MCG: 18 CAPSULE ORAL; RESPIRATORY (INHALATION) at 07:31

## 2018-09-21 RX ADMIN — PANTOPRAZOLE SODIUM 40 MG: 40 TABLET, DELAYED RELEASE ORAL at 08:14

## 2018-09-21 RX ADMIN — TAMSULOSIN HYDROCHLORIDE 0.4 MG: 0.4 CAPSULE ORAL at 08:14

## 2018-09-21 RX ADMIN — Medication 10 ML: at 06:19

## 2018-09-21 RX ADMIN — METOPROLOL SUCCINATE 25 MG: 25 TABLET, EXTENDED RELEASE ORAL at 08:14

## 2018-09-21 RX ADMIN — LEVOTHYROXINE SODIUM 25 MCG: 50 TABLET ORAL at 08:16

## 2018-09-21 RX ADMIN — ISOSORBIDE MONONITRATE 30 MG: 30 TABLET, EXTENDED RELEASE ORAL at 08:16

## 2018-09-21 RX ADMIN — Medication 400 MG: at 08:16

## 2018-09-21 RX ADMIN — TOPIRAMATE 100 MG: 100 TABLET, FILM COATED ORAL at 08:15

## 2018-09-21 NOTE — PROGRESS NOTES
Bedside and Verbal shift change report given to self (oncoming nurse) by Katie Turner RN (offgoing nurse). Report included the following information SBAR, Kardex, MAR and Recent Results.

## 2018-09-21 NOTE — DISCHARGE SUMMARY
[facility] Cardiology Discharge Summary     Patient ID:  Shanna Stock  [id number]  82 y.o.  1945    Admit date: 9/19/2018    Discharge date:  9/21/2018    Admitting Physician: Duane Ritter MD     Discharge Physician: Dr. Howie Brown    Admission Diagnoses: NSTEMI (non-ST elevated myocardial infarction) [facility])    Discharge Diagnoses:    Diagnosis    S/P CABG x 2    [facility]STEMI (non-ST elevated [facility] infarction) [facility] [address]    COPD (chronic obstructive pulmonary disease) ([facility] [address]    Long term (current) use of anticoagulants    S/P AVR (aortic valve replacement)    Coronary atherosclerosis of native coronary artery    Mixed hyperlipidemia    Tobacco use disorder    Essential hypertension       Cardiology Procedures this admission:  Left heart catheterization with PCI  EchoCardiogram  Consults: None    Hospital Course: Patient presented to the ER with complaints of chest pain. Peak troponin was noted to be 2.85. Patient was admitted to telemetry for continued care. Patient underwent cardiac catheterization by Dr. Chris Lagos. Patient was found to have 90% stenosis of the LM that was stented with a 3.0 x 12-mm Synergy DARBY with 0% residual stenosis. Patient was also found to have 80% stenosis of the left circumflex that was stented with a 2.5 x 38-mm and 3.0 x 38-mm Synergy DARBY with 0% residual stenosis. Patient also had 80% stenosis of the SVG to LAD that was stented with 5.0 x 22-mm Abdullahi DARBY with 0% residual stenosis. Patient tolerated the procedure well and was taken to the telemetry floor for recovery. Echocardiogram was done and showed:  -  Left ventricle: Systolic function was normal. Ejection fraction was estimated in the range of 55 % to 60 %. There were no regional wall motion abnormalities. There was mild concentric hypertrophy. -  Left atrium: The atrium was mildly dilated. -  Inferior vena cava, hepatic veins: The respirophasic change in diameter was less than 50%.   -  Aortic valve: The aortic valve area by the continuity equation was 1.4 cm2.  -  Mitral valve: There was mild annular calcification. There was mild regurgitation. -  Tricuspid valve: There was mild regurgitation. The morning of discharge, patient was up feeling well without any complaints of chest pain or shortness of breath. Patient's left cath site was clean, dry and intact without hematoma or bruit. Patient's labs were WNL. Patient was seen and examined by Dr. Christina Valero and determined stable and ready for discharge. Patient was instructed on the importance of medication compliance including taking aspirin and Plavix everyday without missing a dose. After receiving drug eluting stents, the patient will remain on dual anti-platelet therapy for 1 year. Given the patient's mechanical AVR patient will be on triple therapy for 1 month, ASA, plavix and coumadin. ASA will be discontinued after 30 days of triple therapy. For maximized medical therapy for CAD, patient will continue  BB, ACE-I, and statin as well. The patient will follow up with [address] [facility] -- Dr. Eddie Baez as TC-7 follow-up. Patient has been referred to cardiac rehab. Patient does home draw for INR level and calls office, instructed to check level on Tuesday and call [facility] with result. DISPOSITION: The patient is being discharged home in stable condition on a low saturated fat, low cholesterol and low salt diet. The patient is instructed to advance activities as tolerated to the limit of fatigue or shortness of breath. The patient is instructed to avoid all heavy lifting for 3-5 days. The patient is instructed to watch the cath site for bleeding/oozing; if seen, the patient is instructed to apply firm pressure with a clean cloth and call [facility] at [phone]. The patient is instructed to watch for signs of infection which include: increasing area of redness, fever/hot to touch or purulent drainage at the catheterization site.  The patient is instructed not to soak in a bathtub for 7-10 days, but is cleared to shower. The patient is instructed to call the office or return to the ER for immediate evaluation for any shortness of breath or chest pain not relieved by NTG. Discharge Exam: Patient has been seen by Dr. Ferrel Alpers: see his progress note for exam details.     Visit Vitals    /63 (BP 1 Location: Right arm, BP Patient Position: At rest)    Pulse 70    Temp 98 °F (36.7 °C)    Resp 18    Ht 5' 8\" (1.727 m)    Wt 90.1 kg (198 lb 9.6 oz)    SpO2 99%    BMI 30.2 kg/m2       Recent Results (from the past 24 hour(s))   METABOLIC PANEL, BASIC    Collection Time: 09/20/18  4:32 AM   Result Value Ref Range    Sodium 142 136 - 145 mmol/L    Potassium 3.9 3.5 - 5.1 mmol/L    Chloride 113 (H) 98 - 107 mmol/L    CO2 20 (L) 21 - 32 mmol/L    Anion gap 9 7 - 16 mmol/L    Glucose 94 65 - 100 mg/dL    BUN 19 8 - 23 MG/DL    Creatinine 1.01 0.8 - 1.5 MG/DL    GFR est AA >60 >60 ml/min/1.73m2    GFR est non-AA >60 >60 ml/min/1.73m2    Calcium 7.9 (L) 8.3 - 10.4 MG/DL   LIPID PANEL    Collection Time: 09/20/18  4:32 AM   Result Value Ref Range    LIPID PROFILE          Cholesterol, total 104 <200 MG/DL    Triglyceride 114 35 - 150 MG/DL    HDL Cholesterol 34 (L) 40 - 60 MG/DL    LDL, calculated 47.2 <100 MG/DL    VLDL, calculated 22.8 6.0 - 23.0 MG/DL    CHOL/HDL Ratio 3.1     CBC W/O DIFF    Collection Time: 09/20/18  4:32 AM   Result Value Ref Range    WBC 11.5 (H) 4.3 - 11.1 K/uL    RBC 4.40 4.23 - 5.6 M/uL    HGB 13.7 13.6 - 17.2 g/dL    HCT 42.1 41.1 - 50.3 %    MCV 95.7 79.6 - 97.8 FL    MCH 31.1 26.1 - 32.9 PG    MCHC 32.5 31.4 - 35.0 g/dL    RDW 13.4 %    PLATELET 874 205 - 231 K/uL    MPV 10.0 9.4 - 12.3 FL    ABSOLUTE NRBC 0.00 0.0 - 0.2 K/uL   PTT    Collection Time: 09/20/18  4:32 AM   Result Value Ref Range    aPTT 145.9 (HH) 23.2 - 35.3 SEC   TROPONIN I    Collection Time: 09/20/18  4:32 AM   Result Value Ref Range    Troponin-I, Qt. 2.00 (HH) 0.02 - 0.05 NG/ML   PROTHROMBIN TIME + INR    Collection Time: 09/20/18  4:32 AM   Result Value Ref Range    Prothrombin time 23.9 (H) 11.5 - 14.5 sec    INR 2.3     PTT    Collection Time: 09/20/18 12:34 PM   Result Value Ref Range    aPTT 118.0 (HH) 23.2 - 35.3 SEC   BLOOD GAS, ARTERIAL    Collection Time: 09/20/18  3:28 PM   Result Value Ref Range    pH 7.41 7.35 - 7.45      PCO2 28 (L) 35 - 45 mmHg    PO2 99 80 - 105 mmHg    BICARBONATE 18 (L) 22 - 26 mmol/L    BASE DEFICIT 5.7 (H) 0 - 2 mmol/L    TOTAL HEMOGLOBIN 14.0 11.7 - 15.0 GM/DL    O2 SAT 97 92 - 98.5 %    Arterial O2 Hgb 96.7 94 - 97 %    CARBOXYHEMOGLOBIN 0.2 (L) 0.5 - 1.5 %    METHEMOGLOBIN 0.3 0.0 - 1.5 %    DEOXYHEMOGLOBIN 3 0.0 - 5.0 %    SITE CARLEY     ALLENS TEST NA     MODE NC     O2 FLOW 4.00 L/min   EKG, 12 LEAD, INITIAL    Collection Time: 09/20/18  6:11 PM   Result Value Ref Range    Ventricular Rate 72 BPM    Atrial Rate 72 BPM    P-R Interval 174 ms    QRS Duration 94 ms    Q-T Interval 416 ms    QTC Calculation (Bezet) 455 ms    Calculated P Axis 47 degrees    Calculated R Axis 8 degrees    Calculated T Axis 65 degrees    Diagnosis       Sinus rhythm with Premature atrial complexes  Otherwise normal ECG  When compared with ECG of 19-SEP-2018 15:18,  QT has lengthened  Confirmed by Nilesh Lee MD (), JERMAINE VAZ ([id number]) on 9/20/2018 8:33:30 PM           Patient Instructions:   Current Discharge Medication List      START taking these medications    Details   lisinopril (PRINIVIL, ZESTRIL) 2.5 mg tablet Take 1 Tab by mouth daily. Qty: 30 Tab, Refills: 11      metoprolol succinate (TOPROL-XL) 25 mg XL tablet Take 1 Tab by mouth daily. Qty: 30 Tab, Refills: 11    Associated Diagnoses: NSTEMI (non-ST elevated myocardial infarction) ([address]      clopidogrel (PLAVIX) 75 mg tab Take 1 Tab by mouth daily.   Qty: 30 Tab, Refills: 11      nitroglycerin (NITROSTAT) 0.4 mg SL tablet 1 Tab by SubLINGual route every five (5) minutes as needed for Chest Pain. Up to 3 doses. Qty: 1 Bottle, Refills: 5         CONTINUE these medications which have NOT CHANGED    Details   predniSONE (DELTASONE) 1 mg tablet Take 5 mg by mouth two (2) times a day. Indications: breathing      omeprazole (PRILOSEC) 40 mg capsule Take 40 mg by mouth daily. fish oil-omega-3 fatty acids 340-1,000 mg capsule Take 1 Cap by mouth daily. warfarin (COUMADIN) 1 mg tablet Take 1 mg by mouth daily. Brand name      potassium 99 mg tablet Take 99 mg by mouth daily. MAGNESIUM PO Take  by mouth. tiotropium (SPIRIVA) 18 mcg inhalation capsule Take 1 Cap by inhalation daily. tamsulosin (FLOMAX) 0.4 mg capsule Take 0.4 mg by mouth daily. topiramate (TOPAMAX) 100 mg tablet Take 100 mg by mouth daily. venlafaxine (EFFEXOR) 75 mg tablet Take 25 mg by mouth nightly. VIT A/C/E AC/ZNOX/CUPRIC OXIDE (EYE VITAMIN AND MINERALS PO) Take  by mouth. aspirin delayed-release 81 mg tablet Take  by mouth daily. atorvastatin (LIPITOR) 40 mg tablet Take  by mouth daily. HYDROcodone-acetaminophen (NORCO) 7.5-325 mg per tablet Take  by mouth.      isosorbide mononitrate ER (IMDUR) 30 mg tablet Take  by mouth daily. levothyroxine (SYNTHROID) 25 mcg tablet Take  by mouth Daily (before breakfast). Dexlansoprazole 60 mg CpDB Take  by mouth.          STOP taking these medications       LORazepam (ATIVAN) 1 mg tablet Comments:   Reason for Stopping:

## 2018-09-21 NOTE — PROGRESS NOTES
Discharge instructions reviewed with patient. Prescriptions given for see discharge and med info sheets provided for all new medications. Opportunity for questions provided. Patient voiced understanding of all discharge instructions.

## 2018-09-21 NOTE — DISCHARGE INSTRUCTIONS
Cardiac Catheterization/Angiography Discharge Instructions    *Check the puncture site frequently for swelling or bleeding. If you see any bleeding, lie down and apply pressure over the area with a clean town or washcloth. Notify your doctor for any redness, swelling, drainage or oozing from the puncture site. Notify your doctor for any fever or chills. *If the leg or arm with the puncture becomes cold, numb or painful, call [facility] at  [phone]. *Activity should be limited for the next 48 hours. Climb stairs as little as possible and avoid any stooping, bending or strenuous activity for 48 hours. No heavy lifting (anything over 10 pounds) for three days. *Do not drive for 48 hours. *You may resume your usual diet. Drink more fluids than usual.    *Have a responsible person drive you home and stay with you for at least 24 hours after your heart catheterization/angiography. *You may remove the bandage from your Right and Groin in 24 hours. You may shower in 24 hours. No tub baths, hot tubs or swimming for one week. Do not place any lotions, creams, powders, ointments over the puncture site for one week. You may place a clean band-aid over the puncture site each day for 5 days. Change this daily. Percutaneous Coronary Intervention: What to Expect at [facility]    Percutaneous coronary intervention (PCI) is the name for procedures that are used to open a narrowed or blocked coronary artery. The two most common PCI procedures are coronary angioplasty and coronary stent placement. Your groin or arm may have a bruise and feel sore for a day or two after a percutaneous coronary intervention (PCI). You can do light activities around the house, but nothing strenuous for several days. This care sheet gives you a general idea about how long it will take for you to recover. But each person recovers at a different pace.  Follow the steps below to get better as quickly as possible. How can you care for yourself at home? Activity    · If the doctor gave you a sedative:  ¨ For 24 hours, don't do anything that requires attention to detail. It takes time for the medicine's effects to completely wear off. ¨ For your safety, do not drive or operate any machinery that could be dangerous. Wait until the medicine wears off and you can think clearly and react easily.     · Do not do strenuous exercise and do not lift, pull, or push anything heavy until your doctor says it is okay. This may be for a day or two. You can walk around the house and do light activity, such as cooking.     · If the catheter was placed in your groin, try not to walk up stairs for the first couple of days.     · If the catheter was placed in your arm near your wrist, do not bend your wrist deeply for the first couple of days. Be careful using your hand to get into and out of a chair or bed.     · Carry your stent identification card with you at all times.     · If your doctor recommends it, get more exercise. Walking is a good choice. Bit by bit, increase the amount you walk every day. Try for at least 30 minutes on most days of the week. Diet    · Drink plenty of fluids to help your body flush out the dye. If you have kidney, heart, or liver disease and have to limit fluids, talk with your doctor before you increase the amount of fluids you drink.     · Keep eating a heart-healthy diet that has lots of fruits, vegetables, and whole grains. If you have not been eating this way, talk to your doctor. You also may want to talk to a dietitian. This expert can help you to learn about healthy foods and plan meals. Medicines    · Your doctor will tell you if and when you can restart your medicines. He or she will also give you instructions about taking any new medicines.     · If you take blood thinners, such as warfarin (Coumadin), clopidogrel (Plavix), or aspirin, be sure to talk to your doctor.  He or she will tell you if and when to start taking those medicines again. Make sure that you understand exactly what your doctor wants you to do.     · Your doctor will prescribe blood-thinning medicines. You will likely take aspirin plus another antiplatelet, such as clopidogrel (Plavix). It is very important that you take these medicines exactly as directed. These medicines help keep the coronary artery open and reduce your risk of a heart attack.     · Call your doctor if you think you are having a problem with your medicine.    Care of the catheter site    · For 1 or 2 days, keep a bandage over the spot where the catheter was inserted. The bandage probably will fall off in this time.     · Put ice or a cold pack on the area for 10 to 20 minutes at a time to help with soreness or swelling. Put a thin cloth between the ice and your skin.     · You may shower 24 to 48 hours after the procedure, if your doctor okays it. Pat the incision dry.     · Do not soak the catheter site until it is healed. Don't take a bath for 1 week, or until your doctor tells you it is okay.     · If you are bleeding, lie down and press on the area for 15 minutes to try to make it stop. If the bleeding does not stop, call your doctor or seek immediate medical care. Follow-up care is a key part of your treatment and safety. Be sure to make and go to all appointments, and call your doctor if you are having problems. It's also a good idea to know your test results and keep a list of the medicines you take. When should you call for help? Call 911 anytime you think you may need emergency care. For example, call if:    · You passed out (lost consciousness).     · You have severe trouble breathing.     · You have sudden chest pain and shortness of breath, or you cough up blood.     · You have symptoms of a heart attack, such as:  ¨ Chest pain or pressure. ¨ Sweating. ¨ Shortness of breath. ¨ Nausea or vomiting.   ¨ Pain that spreads from the chest to the neck, jaw, or one or both shoulders or arms. ¨ Dizziness or lightheadedness. ¨ A fast or uneven pulse. After calling 911, chew 1 adult-strength aspirin. Wait for an ambulance. Do not try to drive yourself.     · You have been diagnosed with angina, and you have angina symptoms that do not go away with rest or are not getting better within 5 minutes after you take one dose of nitroglycerin.    Call your doctor now or seek immediate medical care if:    · You are bleeding from the area where the catheter was put in your artery.     · You have a fast-growing, painful lump at the catheter site.     · You have signs of infection, such as:  ¨ Increased pain, swelling, warmth, or redness. ¨ Red streaks leading from the catheter site. ¨ Pus draining from the catheter site. ¨ A fever.     · Your leg or arm looks blue or feels cold, numb, or tingly.    Watch closely for changes in your health, and be sure to contact your doctor if you have any problems. Where can you learn more? Go to http://sukhdev-vahe.info/. Enter P273 in the search box to learn more about \"Percutaneous Coronary Intervention: What to Expect at Home. \"  Current as of: December 6, 2017  Content Version: 11.7  © 7431-0836 [employer]. Care instructions adapted under license by [employer] (which disclaims liability or warranty for this information). If you have questions about a medical condition or this instruction, always ask your healthcare professional. [place] [address] any warranty or liability for your use of this information. Clopidogrel (By mouth)       Clopidogrel ([id number])  Helps prevent stroke, heart attack, and other heart problems. This medicine is a platelet inhibitor. Brand Name(s): Plavix   There may be other brand names for this medicine. When This Medicine Should Not Be Used: This medicine is not right for everyone.  Do not use it if you had an allergic reaction to clopidogrel. How to Use This Medicine:   Tablet  · Your doctor will tell you how much medicine to use. Do not use more than directed. · This medicine should come with a Medication Guide. Ask your pharmacist for a copy if you do not have one. · Missed dose: Take a dose as soon as you remember. If it is almost time for your next dose, wait until then and take a regular dose. Do not take extra medicine to make up for a missed dose. · Store the medicine in a closed container at room temperature, away from heat, moisture, and direct light. Drugs and Foods to Avoid:   Ask your doctor or pharmacist before using any other medicine, including over-the-counter medicines, vitamins, and herbal products. · Some medicines can affect how clopidogrel works. Tell your doctor if you are using any of the following:   ¨ Esomeprazole, omeprazole, repaglinide, or warfarin  ¨ Medicine to treat depression  ¨ NSAID pain or arthritis medicine (including celecoxib, diclofenac, ibuprofen, or naproxen)  Warnings While Using This Medicine:   · Tell your doctor if you are pregnant or breastfeeding, or if you have bleeding problems, stomach ulcer or bleeding, a recent stroke, or have a history of bleeding problems. · This medicine can cause you to bleed and bruise more easily. Take precautions to avoid injury. Brush and floss your teeth gently, do not play rough sports, and be careful with sharp objects. Severe bleeding can be life-threatening. · This medicine may cause a rare but serious blood clotting condition called thrombotic thrombocytopenic purpura. · Make sure any doctor or dentist who treats you knows that you are using this medicine. Tell your doctor if you plan to have surgery or a dental procedure. · Do not stop using this medicine suddenly. Your doctor will need to slowly decrease your dose before you stop it completely. · Your doctor will check your progress and the effects of this medicine at regular visits.  Keep all appointments. · Keep all medicine out of the reach of children. Never share your medicine with anyone. Possible Side Effects While Using This Medicine:   Call your doctor right away if you notice any of these side effects:  · Allergic reaction: Itching or hives, swelling in your face or hands, swelling or tingling in your mouth or throat, chest tightness, trouble breathing  · Bloody or black, tarry stools  · Nosebleeds  · Pinpoint red or purple spots on your skin or in your mouth  · Problems with vision, speech, or walking  · Red or dark brown urine  · Seizures  · Severe stomach pain  · Trouble breathing, tiredness, fast heartbeat, yellow skin or eyes  · Unusual bleeding, bruising, or weakness  · Vomiting of blood or vomit that looks like coffee grounds  If you notice other side effects that you think are caused by this medicine, tell your doctor. Call your doctor for medical advice about side effects. You may report side effects to FDA at 4-538-FDA-4269  © 2017 [employer]  Information is for End User's use only and may not be sold, redistributed or otherwise used for commercial purposes. The above information is an  only. It is not intended as medical advice for individual conditions or treatments. Talk to your doctor, nurse or pharmacist before following any medical regimen to see if it is safe and effective for you.

## 2018-09-21 NOTE — PROGRESS NOTES
Cardiac Rehab: Spoke with patient regarding referral to cardiac rehab. Patient meets admission criteria based on NSTEMI with PCI (9/20/18). Written information about Cardiac Rehab given and reviewed with patient. Discussed lifestyle modifications to promote cardiac wellness. Patient indicated that he may want to participate in the cardiac rehab program at the [facility] which is closer to his home. We will forward his outpatient referral to [facility] as requested. His Cardiologist is Dr. Alejandra Bolivar. Thank you, YEN Montejo, RN Cardiopulmonary Rehabilitation Nurse Liaison [facility]

## 2018-09-21 NOTE — PROGRESS NOTES
Bedside and Verbal shift change report given to self (oncoming nurse) by Librado Cuevas RN (offgoing nurse). Report included the following information SBAR, Kardex, MAR and Recent Results.

## 2018-09-21 NOTE — PROGRESS NOTES
Radial compression band removed at 2030 after slowly reducing air from 12 cc to zero as per hospital protocol. No bleeding or hematoma noted. 2 x 2 gauze with tegaderm placed over puncture site. The affected extremity is warm and dry to the touch. Frequent vital signs documented per flowsheet. Patient instructed to call if any bleeding noted on gauze. Patient verbalized understanding the nursing instructions.

## 2018-09-23 ENCOUNTER — HOSPITAL ENCOUNTER (INPATIENT)
Age: 73
LOS: 3 days | Discharge: HOME | DRG: 190 | End: 2018-09-26
Attending: INTERNAL MEDICINE | Admitting: INTERNAL MEDICINE
Payer: COMMERCIAL

## 2018-09-23 DIAGNOSIS — Z95.1 S/P CABG X 2: Chronic | ICD-10-CM

## 2018-09-23 DIAGNOSIS — F17.200 TOBACCO USE DISORDER: Chronic | ICD-10-CM

## 2018-09-23 DIAGNOSIS — J44.1 COPD EXACERBATION (HCC): ICD-10-CM

## 2018-09-23 DIAGNOSIS — Z79.52 CURRENT CHRONIC USE OF SYSTEMIC STEROIDS: Chronic | ICD-10-CM

## 2018-09-23 DIAGNOSIS — R09.02 HYPOXIA: ICD-10-CM

## 2018-09-23 DIAGNOSIS — Z79.01 ANTICOAGULATED ON COUMADIN: Chronic | ICD-10-CM

## 2018-09-23 DIAGNOSIS — Z95.2 S/P AVR (AORTIC VALVE REPLACEMENT): Chronic | ICD-10-CM

## 2018-09-23 DIAGNOSIS — J44.9 CHRONIC OBSTRUCTIVE PULMONARY DISEASE, UNSPECIFIED COPD TYPE (HCC): Chronic | ICD-10-CM

## 2018-09-23 PROBLEM — E03.9 ACQUIRED HYPOTHYROIDISM: Chronic | Status: ACTIVE | Noted: 2018-09-23

## 2018-09-23 PROBLEM — E03.9 ACQUIRED HYPOTHYROIDISM: Status: ACTIVE | Noted: 2018-09-23

## 2018-09-23 LAB
D DIMER PPP FEU-MCNC: 0.4 UG/ML(FEU)
INR PPP: 2.1
PROTHROMBIN TIME: 22.6 SEC (ref 11.5–14.5)
TROPONIN I SERPL-MCNC: 0.4 NG/ML (ref 0.02–0.05)

## 2018-09-23 PROCEDURE — 36415 COLL VENOUS BLD VENIPUNCTURE: CPT

## 2018-09-23 PROCEDURE — 74011000250 HC RX REV CODE- 250: Performed by: NURSE PRACTITIONER

## 2018-09-23 PROCEDURE — 85610 PROTHROMBIN TIME: CPT

## 2018-09-23 PROCEDURE — 74011250636 HC RX REV CODE- 250/636: Performed by: NURSE PRACTITIONER

## 2018-09-23 PROCEDURE — 74011250637 HC RX REV CODE- 250/637: Performed by: NURSE PRACTITIONER

## 2018-09-23 PROCEDURE — 85379 FIBRIN DEGRADATION QUANT: CPT

## 2018-09-23 PROCEDURE — 94760 N-INVAS EAR/PLS OXIMETRY 1: CPT

## 2018-09-23 PROCEDURE — 84484 ASSAY OF TROPONIN QUANT: CPT

## 2018-09-23 PROCEDURE — 65660000000 HC RM CCU STEPDOWN

## 2018-09-23 PROCEDURE — 94640 AIRWAY INHALATION TREATMENT: CPT

## 2018-09-23 RX ORDER — DIPHENHYDRAMINE HCL 25 MG
25 CAPSULE ORAL
Status: DISCONTINUED | OUTPATIENT
Start: 2018-09-23 | End: 2018-09-26 | Stop reason: HOSPADM

## 2018-09-23 RX ORDER — TOPIRAMATE 100 MG/1
100 TABLET, FILM COATED ORAL DAILY
Status: DISCONTINUED | OUTPATIENT
Start: 2018-09-24 | End: 2018-09-26 | Stop reason: HOSPADM

## 2018-09-23 RX ORDER — HEPARIN SODIUM 5000 [USP'U]/100ML
12-25 INJECTION, SOLUTION INTRAVENOUS
Status: DISCONTINUED | OUTPATIENT
Start: 2018-09-23 | End: 2018-09-24

## 2018-09-23 RX ORDER — HYDROCODONE BITARTRATE AND ACETAMINOPHEN 7.5; 325 MG/1; MG/1
1 TABLET ORAL
Status: DISCONTINUED | OUTPATIENT
Start: 2018-09-23 | End: 2018-09-26 | Stop reason: HOSPADM

## 2018-09-23 RX ORDER — LEVOTHYROXINE SODIUM 50 UG/1
25 TABLET ORAL
Status: DISCONTINUED | OUTPATIENT
Start: 2018-09-24 | End: 2018-09-26 | Stop reason: HOSPADM

## 2018-09-23 RX ORDER — ALBUTEROL SULFATE 0.83 MG/ML
2.5 SOLUTION RESPIRATORY (INHALATION) AS NEEDED
Status: DISCONTINUED | OUTPATIENT
Start: 2018-09-23 | End: 2018-09-23

## 2018-09-23 RX ORDER — ACETAMINOPHEN 325 MG/1
650 TABLET ORAL
Status: DISCONTINUED | OUTPATIENT
Start: 2018-09-23 | End: 2018-09-26 | Stop reason: HOSPADM

## 2018-09-23 RX ORDER — MAG HYDROX/ALUMINUM HYD/SIMETH 200-200-20
30 SUSPENSION, ORAL (FINAL DOSE FORM) ORAL
Status: DISCONTINUED | OUTPATIENT
Start: 2018-09-23 | End: 2018-09-26 | Stop reason: HOSPADM

## 2018-09-23 RX ORDER — PANTOPRAZOLE SODIUM 40 MG/1
40 TABLET, DELAYED RELEASE ORAL DAILY
Status: DISCONTINUED | OUTPATIENT
Start: 2018-09-24 | End: 2018-09-26 | Stop reason: HOSPADM

## 2018-09-23 RX ORDER — MORPHINE SULFATE 2 MG/ML
2 INJECTION, SOLUTION INTRAMUSCULAR; INTRAVENOUS
Status: DISCONTINUED | OUTPATIENT
Start: 2018-09-23 | End: 2018-09-26 | Stop reason: HOSPADM

## 2018-09-23 RX ORDER — NALOXONE HYDROCHLORIDE 0.4 MG/ML
0.4 INJECTION, SOLUTION INTRAMUSCULAR; INTRAVENOUS; SUBCUTANEOUS AS NEEDED
Status: DISCONTINUED | OUTPATIENT
Start: 2018-09-23 | End: 2018-09-26 | Stop reason: HOSPADM

## 2018-09-23 RX ORDER — PREDNISONE 5 MG/1
5 TABLET ORAL 2 TIMES DAILY
Status: DISCONTINUED | OUTPATIENT
Start: 2018-09-24 | End: 2018-09-24

## 2018-09-23 RX ORDER — LORAZEPAM 1 MG/1
1 TABLET ORAL
Status: DISCONTINUED | OUTPATIENT
Start: 2018-09-23 | End: 2018-09-26 | Stop reason: HOSPADM

## 2018-09-23 RX ORDER — CLOPIDOGREL BISULFATE 75 MG/1
75 TABLET ORAL DAILY
Status: DISCONTINUED | OUTPATIENT
Start: 2018-09-24 | End: 2018-09-26 | Stop reason: HOSPADM

## 2018-09-23 RX ORDER — VENLAFAXINE 25 MG/1
25 TABLET ORAL
Status: DISCONTINUED | OUTPATIENT
Start: 2018-09-23 | End: 2018-09-26 | Stop reason: HOSPADM

## 2018-09-23 RX ORDER — NITROGLYCERIN 0.4 MG/1
0.4 TABLET SUBLINGUAL
Status: DISCONTINUED | OUTPATIENT
Start: 2018-09-23 | End: 2018-09-26 | Stop reason: HOSPADM

## 2018-09-23 RX ORDER — SODIUM CHLORIDE 9 MG/ML
50 INJECTION, SOLUTION INTRAVENOUS CONTINUOUS
Status: DISCONTINUED | OUTPATIENT
Start: 2018-09-24 | End: 2018-09-24

## 2018-09-23 RX ORDER — ASPIRIN 81 MG/1
81 TABLET ORAL DAILY
Status: DISCONTINUED | OUTPATIENT
Start: 2018-09-24 | End: 2018-09-26 | Stop reason: HOSPADM

## 2018-09-23 RX ORDER — METOPROLOL SUCCINATE 25 MG/1
25 TABLET, EXTENDED RELEASE ORAL DAILY
Status: DISCONTINUED | OUTPATIENT
Start: 2018-09-24 | End: 2018-09-24

## 2018-09-23 RX ORDER — BUDESONIDE 0.5 MG/2ML
500 INHALANT ORAL
Status: DISCONTINUED | OUTPATIENT
Start: 2018-09-23 | End: 2018-09-26 | Stop reason: HOSPADM

## 2018-09-23 RX ORDER — SODIUM CHLORIDE 0.9 % (FLUSH) 0.9 %
5-10 SYRINGE (ML) INJECTION AS NEEDED
Status: DISCONTINUED | OUTPATIENT
Start: 2018-09-23 | End: 2018-09-26 | Stop reason: HOSPADM

## 2018-09-23 RX ORDER — SODIUM CHLORIDE 0.9 % (FLUSH) 0.9 %
5-10 SYRINGE (ML) INJECTION EVERY 8 HOURS
Status: DISCONTINUED | OUTPATIENT
Start: 2018-09-23 | End: 2018-09-26 | Stop reason: HOSPADM

## 2018-09-23 RX ORDER — ALBUTEROL SULFATE 0.83 MG/ML
2.5 SOLUTION RESPIRATORY (INHALATION)
Status: DISCONTINUED | OUTPATIENT
Start: 2018-09-24 | End: 2018-09-26 | Stop reason: HOSPADM

## 2018-09-23 RX ORDER — TAMSULOSIN HYDROCHLORIDE 0.4 MG/1
0.4 CAPSULE ORAL DAILY
Status: DISCONTINUED | OUTPATIENT
Start: 2018-09-24 | End: 2018-09-26 | Stop reason: HOSPADM

## 2018-09-23 RX ORDER — HEPARIN SODIUM 5000 [USP'U]/ML
4000 INJECTION, SOLUTION INTRAVENOUS; SUBCUTANEOUS ONCE
Status: COMPLETED | OUTPATIENT
Start: 2018-09-23 | End: 2018-09-23

## 2018-09-23 RX ORDER — LISINOPRIL 5 MG/1
2.5 TABLET ORAL DAILY
Status: DISCONTINUED | OUTPATIENT
Start: 2018-09-24 | End: 2018-09-24

## 2018-09-23 RX ORDER — ONDANSETRON 2 MG/ML
4 INJECTION INTRAMUSCULAR; INTRAVENOUS
Status: DISCONTINUED | OUTPATIENT
Start: 2018-09-23 | End: 2018-09-26 | Stop reason: HOSPADM

## 2018-09-23 RX ORDER — ATORVASTATIN CALCIUM 40 MG/1
40 TABLET, FILM COATED ORAL DAILY
Status: DISCONTINUED | OUTPATIENT
Start: 2018-09-24 | End: 2018-09-26 | Stop reason: HOSPADM

## 2018-09-23 RX ADMIN — NITROGLYCERIN 1 INCH: 20 OINTMENT TOPICAL at 23:35

## 2018-09-23 RX ADMIN — HEPARIN SODIUM AND DEXTROSE 12 UNITS/KG/HR: 5000; 5 INJECTION INTRAVENOUS at 23:11

## 2018-09-23 RX ADMIN — ACETAMINOPHEN 650 MG: 325 TABLET ORAL at 23:35

## 2018-09-23 RX ADMIN — BUDESONIDE 500 MCG: 0.5 INHALANT RESPIRATORY (INHALATION) at 22:25

## 2018-09-23 RX ADMIN — HEPARIN SODIUM 4000 UNITS: 5000 INJECTION INTRAVENOUS; SUBCUTANEOUS at 23:09

## 2018-09-23 RX ADMIN — VENLAFAXINE 25 MG: 25 TABLET ORAL at 22:12

## 2018-09-23 RX ADMIN — MORPHINE SULFATE 2 MG: 2 INJECTION, SOLUTION INTRAMUSCULAR; INTRAVENOUS at 22:12

## 2018-09-23 RX ADMIN — Medication 10 ML: at 22:15

## 2018-09-23 NOTE — IP AVS SNAPSHOT
Spencer Knott 
 
 
 [address] [facility] [address] [facility] 
[phone] Patient: Shanna Stock MRN: [id number] BFO:[id number] A check jerardo indicates which time of day the medication should be taken. My Medications CHANGE how you take these medications Instructions Each Dose to Equal  
 Morning Noon Evening Bedtime  
 predniSONE 20 mg tablet Commonly known as:  Lucille Michel What changed:   
- medication strength 
- how much to take 
- how to take this - when to take this 
- additional instructions Take 2 pill for 3 days Then 1 pill for 3 days  Then 1/2 pill for 3 days Then stop CONTINUE taking these medications Instructions Each Dose to Equal  
 Morning Noon Evening Bedtime  
 aspirin delayed-release 81 mg tablet Take  by mouth daily. atorvastatin 40 mg tablet Commonly known as:  LIPITOR Take  by mouth daily. clopidogrel 75 mg Tab Commonly known as:  PLAVIX Take 1 Tab by mouth daily. 75 mg Dexlansoprazole 60 mg Cpdb Take  by mouth. fish oil-omega-3 fatty acids 340-1,000 mg capsule Take 1 Cap by mouth daily. 1 Cap HYDROcodone-acetaminophen 7.5-325 mg per tablet Commonly known as:  Saulo Ann Take  by mouth.  
     
  
   
   
   
  
 levothyroxine 25 mcg tablet Commonly known as:  SYNTHROID Take  by mouth Daily (before breakfast). lisinopril 2.5 mg tablet Commonly known as:  Juliette Pinch Take 1 Tab by mouth daily. 2.5 mg  
    
  
   
   
   
  
 MAGNESIUM PO Take  by mouth. nitroglycerin 0.4 mg SL tablet Commonly known as:  NITROSTAT  
   
 1 Tab by SubLINGual route every five (5) minutes as needed for Chest Pain. Up to 3 doses.   
 0.4 mg  
    
   
 potassium 99 mg tablet Take 99 mg by mouth daily. 99 mg  
    
  
   
   
   
  
 tamsulosin 0.4 mg capsule Commonly known as:  FLOMAX Take 0.4 mg by mouth daily. 0.4 mg  
    
  
   
   
   
  
 tiotropium 18 mcg inhalation capsule Commonly known as:  Codi Samir Take 1 Cap by inhalation daily. 1 Cap  
    
  
   
   
   
  
 topiramate 100 mg tablet Commonly known as:  TOPAMAX Take 100 mg by mouth daily. 100 mg  
    
  
   
   
   
  
 venlafaxine 75 mg tablet Commonly known as:  [facility] Take 25 mg by mouth nightly. 25 mg  
    
   
   
   
  
  
 warfarin 1 mg tablet Commonly known as:  COUMADIN Take 1 mg by mouth daily. Brand name 1 mg STOP taking these medications EYE VITAMIN AND MINERALS PO  
   
  
 isosorbide mononitrate ER 30 mg tablet Commonly known as:  IMDUR  
   
  
 metoprolol succinate 25 mg XL tablet Commonly known as:  TOPROL-XL Where to Get Your Medications Information on where to get these meds will be given to you by the nurse or doctor. ! Ask your nurse or doctor about these medications  
  predniSONE 20 mg tablet

## 2018-09-23 NOTE — IP AVS SNAPSHOT
[address] 
[phone] Patient: Shelbi Jules MRN: [id number] TEQ:[id number] About your hospitalization You were admitted on:  September 23, 2018 You last received care in the:  [facility] 3 TELEMETRY You were discharged on:  September 26, 2018 Why you were hospitalized Your primary diagnosis was:  Chest Pain Your diagnoses also included:  Copd (Chronic Obstructive Pulmonary Disease) (Hcc), Coronary Atherosclerosis Of Native Coronary Artery, Tobacco Use Disorder, Mixed Hyperlipidemia, Essential Hypertension, S/P Avr (Aortic Valve Replacement), Acquired Hypothyroidism, Anticoagulated On Coumadin, Current Chronic Use Of Systemic Steroids Follow-up Information Follow up With Details Comments Contact Info Soham Daniels  2 weeks Nicolasa Atkinson [address] [address] Bianca Naik MD On 10/3/2018 Wednesday 10/3 at [address] [place] [address] 
[phone] Alex Khanna MD Schedule an appointment as soon as possible for a visit As needed 1 [facility] [address] Atkinson [address] [id number] [phone] Your Scheduled Appointments Wednesday October 03, 2018  9:15 AM EDT HOSPITAL FOLLOW-UP with Bianca Naik MD  
1 [facility] ([address] [place] [address]) [id number] [place] [id number]  
[phone] Discharge Orders None A check jerardo indicates which time of day the medication should be taken. My Medications CHANGE how you take these medications Instructions Each Dose to Equal  
 Morning Noon Evening Bedtime  
 predniSONE 20 mg tablet Commonly known as:  Kendal Jeffers What changed:   
- medication strength 
- how much to take 
- how to take this - when to take this 
- additional instructions Take 2 pill for 3 days Then 1 pill for 3 days  Then 1/2 pill for 3 days Then stop CONTINUE taking these medications Instructions Each Dose to Equal  
 Morning Noon Evening Bedtime  
 aspirin delayed-release 81 mg tablet Take  by mouth daily. atorvastatin 40 mg tablet Commonly known as:  LIPITOR Take  by mouth daily. clopidogrel 75 mg Tab Commonly known as:  PLAVIX Take 1 Tab by mouth daily. 75 mg Dexlansoprazole 60 mg Cpdb Take  by mouth. fish oil-omega-3 fatty acids 340-1,000 mg capsule Take 1 Cap by mouth daily. 1 Cap HYDROcodone-acetaminophen 7.5-325 mg per tablet Commonly known as:  [address] Take  by mouth.  
     
  
   
   
   
  
 levothyroxine 25 mcg tablet Commonly known as:  SYNTHROID Take  by mouth Daily (before breakfast). lisinopril 2.5 mg tablet Commonly known as:  [place]enn[place] Take 1 Tab by mouth daily. 2.5 mg  
    
  
   
   
   
  
 MAGNESIUM PO Take  by mouth. nitroglycerin 0.4 mg SL tablet Commonly known as:  NITROSTAT  
   
 1 Tab by SubLINGual route every five (5) minutes as needed for Chest Pain. Up to 3 doses. 0.4 mg  
    
   
   
   
  
 potassium 99 mg tablet Take 99 mg by mouth daily. 99 mg  
    
  
   
   
   
  
 tamsulosin 0.4 mg capsule Commonly known as:  FLOMAX Take 0.4 mg by mouth daily. 0.4 mg  
    
  
   
   
   
  
 tiotropium 18 mcg inhalation capsule Commonly known as:  Gwenetta Lass Take 1 Cap by inhalation daily. 1 Cap  
    
  
   
   
   
  
 topiramate 100 mg tablet Commonly known as:  TOPAMAX Take 100 mg by mouth daily. 100 mg  
    
  
   
   
   
  
 venlafaxine 75 mg tablet Commonly known as:  SHERI [facility]  
   
 Take 25 mg by mouth nightly. 25 mg  
    
   
   
   
  
  
 warfarin 1 mg tablet Commonly known as:  COUMADIN Take 1 mg by mouth daily. Brand name 1 mg STOP taking these medications EYE VITAMIN AND MINERALS PO  
   
  
 isosorbide mononitrate ER 30 mg tablet Commonly known as:  IMDUR  
   
  
 metoprolol succinate 25 mg XL tablet Commonly known as:  TOPROL-XL Where to Get Your Medications Information on where to get these meds will be given to you by the nurse or doctor. ! Ask your nurse or doctor about these medications  
  predniSONE 20 mg tablet Opioid Education Prescription Opioids: What You Need to Know: 
 
Prescription opioids can be used to help relieve moderate-to-severe pain and are often prescribed following a surgery or injury, or for certain health conditions. These medications can be an important part of treatment but also come with serious risks. Opioids are strong pain medicines. Examples include hydrocodone, oxycodone, fentanyl, and morphine. Heroin is an example of an illegal opioid. It is important to work with your health care provider to make sure you are getting the safest, most effective care. WHAT ARE THE RISKS AND SIDE EFFECTS OF OPIOID USE? Prescription opioids carry serious risks of addiction and overdose, especially with prolonged use. An opioid overdose, often marked by slow breathing, can cause sudden death. The use of prescription opioids can have a number of side effects as well, even when taken as directed. · Tolerance-meaning you might need to take more of a medication for the same pain relief · Physical dependence-meaning you have symptoms of withdrawal when the medication is stopped. Withdrawal symptoms can include nausea, sweating, chills, diarrhea, stomach cramps, and muscle aches.   Withdrawal can last up to several weeks, depending on which drug you took and how long you took it. · Increased sensitivity to pain · Constipation · Nausea, vomiting, and dry mouth · Sleepiness and dizziness · Confusion · Depression · Low levels of testosterone that can result in lower sex drive, energy, and strength · Itching and sweating RISKS ARE GREATER WITH:      
· History of drug misuse, substance use disorder, or overdose · Mental health conditions (such as depression or anxiety) · Sleep apnea · Older age (72 years or older) · Pregnancy Avoid alcohol while taking prescription opioids. Also, unless specifically advised by your health care provider, medications to avoid include: · Benzodiazepines (such as Xanax or Valium) · Muscle relaxants (such as Soma or Flexeril) · Hypnotics (such as Ambien or Lunesta) · Other prescription opioids KNOW YOUR OPTIONS Talk to your health care provider about ways to manage your pain that don't involve prescription opioids. Some of these options may actually work better and have fewer risks and side effects. Consult your physician before adding or stopping any medications, treatments, or physical activity. Options may include: 
· Pain relievers such as acetaminophen, ibuprofen, and naproxen · Some medications that are also used for depression or seizures · Physical therapy and exercise · Counseling to help patients learn how to cope better with triggers of pain and stress. · Application of heat or cold compress · Massage therapy · Relaxation techniques Be Informed Make sure you know the name of your medication, how much and how often to take it, and its potential risks & side effects. IF YOU ARE PRESCRIBED OPIOIDS FOR PAIN: 
· Never take opioids in greater amounts or more often than prescribed. Remember the goal is not to be pain-free but to manage your pain at a tolerable level. · Follow up with your primary care provider to: · Work together to create a plan on how to manage your pain. · Talk about ways to help manage your pain that don't involve prescription opioids. · Talk about any and all concerns and side effects. · Help prevent misuse and abuse. · Never sell or share prescription opioids · Help prevent misuse and abuse. · Store prescription opioids in a secure place and out of reach of others (this may include visitors, children, friends, and family). · Safely dispose of unused/unwanted prescription opioids: Find your community drug take-back program or your pharmacy mail-back program, or flush them down the toilet, following guidance from the Food and Drug Administration (www.fda.gov/Drugs/ResourcesForYou). · Visit www.cdc.gov/drugoverdose to learn about the risks of opioid abuse and overdose. · If you believe you may be struggling with addiction, tell your health care provider and ask for guidance or call [employer] at 8-895-314-TFVQ. Discharge Instructions Chronic Obstructive Pulmonary Disease (COPD): Care Instructions Your Care Instructions Chronic obstructive pulmonary disease (COPD) is a general term for a group of lung diseases, including emphysema and chronic bronchitis. People with COPD have decreased airflow in and out of the lungs, which makes it hard to breathe. The airways also can get clogged with thick mucus. Cigarette smoking is a major cause of COPD. Although there is no cure for COPD, you can slow its progress. Following your treatment plan and taking care of yourself can help you feel better and live longer. Follow-up care is a key part of your treatment and safety. Be sure to make and go to all appointments, and call your doctor if you are having problems. It's also a good idea to know your test results and keep a list of the medicines you take. How can you care for yourself at home? 
 Staying healthy   · Do not smoke. This is the most important step you can take to prevent more damage to your lungs. If you need help quitting, talk to your doctor about stop-smoking programs and medicines. These can increase your chances of quitting for good.  
  · Avoid colds and flu. Get a pneumococcal vaccine shot. If you have had one before, ask your doctor whether you need a second dose. Get the flu vaccine every fall. If you must be around people with colds or the flu, wash your hands often.  
  · Avoid secondhand smoke, air pollution, and high altitudes. Also avoid cold, dry air and hot, humid air. Stay at home with your windows closed when air pollution is bad.  
 Medicines and oxygen therapy 
  · Take your medicines exactly as prescribed. Call your doctor if you think you are having a problem with your medicine.  
  · You may be taking medicines such as: ¨ Bronchodilators. These help open your airways and make breathing easier. Bronchodilators are either short-acting (work for 6 to 9 hours) or long-acting (work for 24 hours). You inhale most bronchodilators, so they start to act quickly. Always carry your quick-relief inhaler with you in case you need it while you are away from home. ¨ Corticosteroids (prednisone, budesonide). These reduce airway inflammation. They come in pill or inhaled form. You must take these medicines every day for them to work well.  
  · A spacer may help you get more inhaled medicine to your lungs. Ask your doctor or pharmacist if a spacer is right for you. If it is, ask how to use it properly.  
  · Do not take any vitamins, over-the-counter medicine, or herbal products without talking to your doctor first.  
  · If your doctor prescribed antibiotics, take them as directed. Do not stop taking them just because you feel better.  You need to take the full course of antibiotics.  
  · Oxygen therapy boosts the amount of oxygen in your blood and helps you breathe easier. Use the flow rate your doctor has recommended, and do not change it without talking to your doctor first.  
Activity 
  · Get regular exercise. Walking is an easy way to get exercise. Start out slowly, and walk a little more each day.  
  · Pay attention to your breathing. You are exercising too hard if you cannot talk while you are exercising.  
  · Take short rest breaks when doing household chores and other activities.  
  · Learn breathing methods-such as breathing through pursed lips-to help you become less short of breath.  
  · If your doctor has not set you up with a pulmonary rehabilitation program, talk to him or her about whether rehab is right for you. Rehab includes exercise programs, education about your disease and how to manage it, help with diet and other changes, and emotional support. Diet 
  · Eat regular, healthy meals. Use bronchodilators about 1 hour before you eat to make it easier to eat. Eat several small meals instead of three large ones. Drink beverages at the end of the meal. Avoid foods that are hard to chew.  
  · Eat foods that contain protein so that you do not lose muscle mass.  
  · Talk with your doctor if you gain too much weight or if you lose weight without trying.  
 Mental health 
  · Talk to your family, friends, or a therapist about your feelings. It is normal to feel frightened, angry, hopeless, helpless, and even guilty. Talking openly about bad feelings can help you cope. If these feelings last, talk to your doctor. When should you call for help? Call 911 anytime you think you may need emergency care. For example, call if: 
  · You have severe trouble breathing.  
 Call your doctor now or seek immediate medical care if: 
  · You have new or worse trouble breathing.  
  · You cough up blood.  
  · You have a fever.  
 Watch closely for changes in your health, and be sure to contact your doctor if:   · You cough more deeply or more often, especially if you notice more mucus or a change in the color of your mucus.  
  · You have new or worse swelling in your legs or belly.  
  · You are not getting better as expected. Where can you learn more? Go to http://sukhdev-vahe.info/. Laminsherry Loja in the search box to learn more about \"Chronic Obstructive Pulmonary Disease (COPD): Care Instructions. \" Current as of: December 6, 2017 Content Version: 11.7 © 2169-8472 [employer]. Care instructions adapted under license by [employer] (which disclaims liability or warranty for this information). If you have questions about a medical condition or this instruction, always ask your healthcare professional. [address] any warranty or liability for your use of this information. DISCHARGE SUMMARY from Nurse PATIENT INSTRUCTIONS: 
 
After general anesthesia or intravenous sedation, for 24 hours or while taking prescription Narcotics: · Limit your activities · Do not drive and operate hazardous machinery · Do not make important personal or business decisions · Do  not drink alcoholic beverages · If you have not urinated within 8 hours after discharge, please contact your surgeon on call. Report the following to your surgeon: 
· Excessive pain, swelling, redness or odor of or around the surgical area · Temperature over 100.5 · Nausea and vomiting lasting longer than 4 hours or if unable to take medications · Any signs of decreased circulation or nerve impairment to extremity: change in color, persistent  numbness, tingling, coldness or increase pain · Any questions What to do at Home: 
Recommended activity: Activity as tolerated *  Please give a list of your current medications to your Primary Care Provider.  
 
*  Please update this list whenever your medications are discontinued, doses are 
 changed, or new medications (including over-the-counter products) are added. *  Please carry medication information at all times in case of emergency situations. These are general instructions for a healthy lifestyle: No smoking/ No tobacco products/ Avoid exposure to second hand smoke Surgeon General's Warning:  Quitting smoking now greatly reduces serious risk to your health. Obesity, smoking, and sedentary lifestyle greatly increases your risk for illness A healthy diet, regular physical exercise & weight monitoring are important for maintaining a healthy lifestyle You may be retaining fluid if you have a history of heart failure or if you experience any of the following symptoms:  Weight gain of 3 pounds or more overnight or 5 pounds in a week, increased swelling in our hands or feet or shortness of breath while lying flat in bed. Please call your doctor as soon as you notice any of these symptoms; do not wait until your next office visit. Recognize signs and symptoms of STROKE: 
 
F-face looks uneven A-arms unable to move or move unevenly S-speech slurred or non-existent T-time-call 911 as soon as signs and symptoms begin-DO NOT go Back to bed or wait to see if you get better-TIME IS BRAIN. Warning Signs of HEART ATTACK Call 911 if you have these symptoms: 
? Chest discomfort. Most heart attacks involve discomfort in the center of the chest that lasts more than a few minutes, or that goes away and comes back. It can feel like uncomfortable pressure, squeezing, fullness, or pain. ? Discomfort in other areas of the upper body. Symptoms can include pain or discomfort in one or both arms, the back, neck, jaw, or stomach. ? Shortness of breath with or without chest discomfort. ? Other signs may include breaking out in a cold sweat, nausea, or lightheadedness. Don't wait more than five minutes to call [address] [place] [address]!  Fast action can save your life. Calling 911 is almost always the fastest way to get lifesaving treatment. Emergency Medical Services staff can begin treatment when they arrive  up to an hour sooner than if someone gets to the hospital by car. The discharge information has been reviewed with the patient. The patient verbalized understanding. Discharge medications reviewed with the patient and appropriate educational materials and side effects teaching were provided. ___________________________________________________________________________________________________________________________________ Bulu Boxhart Announcement We are excited to announce that we are making your provider's discharge notes available to you in Gravie. You will see these notes when they are completed and signed by the physician that discharged you from your recent hospital stay. If you have any questions or concerns about any information you see in Gravie, please call the Health Information Department where you were seen or reach out to your Primary Care Provider for more information about your plan of care. Introducing [facility] & HE[facility] SERVICES! [employer] introduces Gravie patient portal. Now you can access parts of your medical record, email your doctor's office, and request medication refills online. 1. In your internet browser, go to [email or web address] 2. Click on the First Time User? Click Here link in the Sign In box. You will see the New Member Sign Up page. 3. Enter your Gravie Access Code exactly as it appears below. You will not need to use this code after youve completed the sign-up process. If you do not sign up before the expiration date, you must request a new code. · Gravie Access Code: [email or web address] Expires: 11/19/2018  4:07 PM 
 
4. Enter the last four digits of your Social Security Number (xxxx) and Date of Birth (mm/dd/yyyy) as indicated and click Submit.  You will be taken to the next sign-up page. 5. Create a Influitivet ID. This will be your [employer] login ID and cannot be changed, so think of one that is secure and easy to remember. 6. Create a Influitivet password. You can change your password at any time. 7. Enter your Password Reset Question and Answer. This can be used at a later time if you forget your password. 8. Enter your e-mail address. You will receive e-mail notification when new information is available in [address] 9. Click Sign Up. You can now view and download portions of your medical record. 10. Click the Download Summary menu link to download a portable copy of your medical information. If you have questions, please visit the Frequently Asked Questions section of the [employer] website. Remember, [employer] is NOT to be used for urgent needs. For medical emergencies, dial 911. Now available from your iPhone and Android! Introducing Shahzad Ashton As a [facility] patient, I wanted to make you aware of our electronic visit tool called Shahzad Simónradharubio. Fabrice Delude 24/7 allows you to connect within minutes with a medical provider 24 hours a day, seven days a week via a mobile device or tablet or logging into a secure website from your computer. You can access Shahzad Ashton from anywhere in the [place]. A virtual visit might be right for you when you have a simple condition and feel like you just dont want to get out of bed, or cant get away from work for an appointment, when [facility] is not available (evenings, weekends or holidays), or when youre out of town and need minor care. Electronic visits cost only $49 and if the Fabrice Delude 24/7 provider determines a prescription is needed to treat your condition, one can be electronically transmitted to a nearby pharmacy*. Please take a moment to enroll today if you have not already done so.   The enrollment process is free and takes just a few minutes. To enroll, please download the Iotera 24/7 ady to your tablet or phone, or visit [email or web address] to enroll on your computer. And, as an [address] [place] [address] patient with a [employer] account, the results of your visits will be scanned into your electronic medical record and your primary care provider will be able to view the scanned results. We urge you to continue to see your regular Iotera provider for your ongoing medical care. And while your primary care provider may not be the one available when you seek a Credible virtual visit, the peace of mind you get from getting a real diagnosis real time can be priceless. For more information on Credible, view our Frequently Asked Questions (FAQs) at [email or web address]. Sincerely, 
 
Yanira Arnold MD 
Chief Medical Officer [facility] *:  certain medications cannot be prescribed via Credible Providers Seen During Your Hospitalization Provider Specialty Primary office phone Khris Alvarez MD Cardiology [phone] Immunizations Administered for This Admission Name Date Influenza Vaccine (Quad) PF 9/26/2018 Your Primary Care Physician (PCP) Primary Care Physician Office Phone Office Fax Parminder Marin [phone] You are allergic to the following Allergen Reactions Advair Diskus (Fluticasone-Salmeterol) Unknown (comments)  
 rash Recent Documentation Height Weight BMI Smoking Status 1.727 m 95.3 kg 31.95 kg/m2 Current Every Day Smoker Emergency Contacts Name Discharge Info Relation Home Work Mobile Kelsey Munroe  Girlfriend [18] [phone] Patient Belongings  The following personal items are in your possession at time of discharge: 
  Dental Appliances: None         Home Medications: None   Jewelry: Necklace  Clothing: Footwear, Pants, Bimal    Other Valuables: None Please provide this summary of care documentation to your next provider. Signatures-by signing, you are acknowledging that this After Visit Summary has been reviewed with you and you have received a copy. Patient Signature:  ____________________________________________________________ Date:  ____________________________________________________________  
  
[facility] Provider Signature:  ____________________________________________________________ Date:  ____________________________________________________________

## 2018-09-24 PROBLEM — Z79.52 CURRENT CHRONIC USE OF SYSTEMIC STEROIDS: Chronic | Status: ACTIVE | Noted: 2018-09-24

## 2018-09-24 PROBLEM — Z79.01 ANTICOAGULATED ON COUMADIN: Chronic | Status: ACTIVE | Noted: 2018-09-24

## 2018-09-24 PROBLEM — Z79.52 CURRENT CHRONIC USE OF SYSTEMIC STEROIDS: Status: ACTIVE | Noted: 2018-09-24

## 2018-09-24 LAB
ANION GAP SERPL CALC-SCNC: 10 MMOL/L (ref 7–16)
BUN SERPL-MCNC: 16 MG/DL (ref 8–23)
CALCIUM SERPL-MCNC: 7.5 MG/DL (ref 8.3–10.4)
CHLORIDE SERPL-SCNC: 112 MMOL/L (ref 98–107)
CO2 SERPL-SCNC: 21 MMOL/L (ref 21–32)
CREAT SERPL-MCNC: 1.23 MG/DL (ref 0.8–1.5)
ERYTHROCYTE [DISTWIDTH] IN BLOOD BY AUTOMATED COUNT: 14 %
GLUCOSE SERPL-MCNC: 126 MG/DL (ref 65–100)
HCT VFR BLD AUTO: 37.1 % (ref 41.1–50.3)
HGB BLD-MCNC: 12 G/DL (ref 13.6–17.2)
INR PPP: 2.6
MAGNESIUM SERPL-MCNC: 2.2 MG/DL (ref 1.8–2.4)
MCH RBC QN AUTO: 31.6 PG (ref 26.1–32.9)
MCHC RBC AUTO-ENTMCNC: 32.3 G/DL (ref 31.4–35)
MCV RBC AUTO: 97.6 FL (ref 79.6–97.8)
NRBC # BLD: 0 K/UL (ref 0–0.2)
PLATELET # BLD AUTO: 192 K/UL (ref 150–450)
PMV BLD AUTO: 11 FL (ref 9.4–12.3)
POTASSIUM SERPL-SCNC: 3.9 MMOL/L (ref 3.5–5.1)
PROTHROMBIN TIME: 26.4 SEC (ref 11.5–14.5)
RBC # BLD AUTO: 3.8 M/UL (ref 4.23–5.6)
SODIUM SERPL-SCNC: 143 MMOL/L (ref 136–145)
TROPONIN I SERPL-MCNC: 0.41 NG/ML (ref 0.02–0.05)
TROPONIN I SERPL-MCNC: NORMAL NG/ML (ref 0.02–0.05)
WBC # BLD AUTO: 11.1 K/UL (ref 4.3–11.1)

## 2018-09-24 PROCEDURE — 94640 AIRWAY INHALATION TREATMENT: CPT

## 2018-09-24 PROCEDURE — 74011250637 HC RX REV CODE- 250/637: Performed by: NURSE PRACTITIONER

## 2018-09-24 PROCEDURE — 74011250636 HC RX REV CODE- 250/636: Performed by: INTERNAL MEDICINE

## 2018-09-24 PROCEDURE — 80048 BASIC METABOLIC PNL TOTAL CA: CPT

## 2018-09-24 PROCEDURE — 77030020120 HC VLV RESP PEP HI -B

## 2018-09-24 PROCEDURE — 83735 ASSAY OF MAGNESIUM: CPT

## 2018-09-24 PROCEDURE — 65660000000 HC RM CCU STEPDOWN

## 2018-09-24 PROCEDURE — 74011000250 HC RX REV CODE- 250: Performed by: INTERNAL MEDICINE

## 2018-09-24 PROCEDURE — 99223 1ST HOSP IP/OBS HIGH 75: CPT | Performed by: INTERNAL MEDICINE

## 2018-09-24 PROCEDURE — 74011250636 HC RX REV CODE- 250/636: Performed by: NURSE PRACTITIONER

## 2018-09-24 PROCEDURE — 74011636637 HC RX REV CODE- 636/637: Performed by: NURSE PRACTITIONER

## 2018-09-24 PROCEDURE — 85027 COMPLETE CBC AUTOMATED: CPT

## 2018-09-24 PROCEDURE — 74011000250 HC RX REV CODE- 250: Performed by: NURSE PRACTITIONER

## 2018-09-24 PROCEDURE — C8924 2D TTE W OR W/O FOL W/CON,FU: HCPCS

## 2018-09-24 PROCEDURE — 36415 COLL VENOUS BLD VENIPUNCTURE: CPT

## 2018-09-24 PROCEDURE — 84484 ASSAY OF TROPONIN QUANT: CPT

## 2018-09-24 PROCEDURE — 94760 N-INVAS EAR/PLS OXIMETRY 1: CPT

## 2018-09-24 PROCEDURE — 77010033678 HC OXYGEN DAILY

## 2018-09-24 PROCEDURE — 85610 PROTHROMBIN TIME: CPT

## 2018-09-24 RX ADMIN — HYDROCODONE BITARTRATE AND ACETAMINOPHEN 1 TABLET: 7.5; 325 TABLET ORAL at 08:42

## 2018-09-24 RX ADMIN — ASPIRIN 81 MG: 81 TABLET, COATED ORAL at 08:34

## 2018-09-24 RX ADMIN — TAMSULOSIN HYDROCHLORIDE 0.4 MG: 0.4 CAPSULE ORAL at 08:34

## 2018-09-24 RX ADMIN — HYDROCODONE BITARTRATE AND ACETAMINOPHEN 1 TABLET: 7.5; 325 TABLET ORAL at 22:36

## 2018-09-24 RX ADMIN — AZITHROMYCIN MONOHYDRATE 500 MG: 500 INJECTION, POWDER, LYOPHILIZED, FOR SOLUTION INTRAVENOUS at 00:00

## 2018-09-24 RX ADMIN — Medication 10 ML: at 14:00

## 2018-09-24 RX ADMIN — BUDESONIDE 500 MCG: 0.5 INHALANT RESPIRATORY (INHALATION) at 09:21

## 2018-09-24 RX ADMIN — TOPIRAMATE 100 MG: 100 TABLET, FILM COATED ORAL at 08:33

## 2018-09-24 RX ADMIN — Medication 10 ML: at 21:43

## 2018-09-24 RX ADMIN — MORPHINE SULFATE 2 MG: 2 INJECTION, SOLUTION INTRAMUSCULAR; INTRAVENOUS at 02:37

## 2018-09-24 RX ADMIN — PREDNISONE 5 MG: 5 TABLET ORAL at 08:33

## 2018-09-24 RX ADMIN — NITROGLYCERIN 1 INCH: 20 OINTMENT TOPICAL at 05:06

## 2018-09-24 RX ADMIN — ALBUTEROL SULFATE 2.5 MG: 2.5 SOLUTION RESPIRATORY (INHALATION) at 09:21

## 2018-09-24 RX ADMIN — CLOPIDOGREL BISULFATE 75 MG: 75 TABLET ORAL at 08:33

## 2018-09-24 RX ADMIN — ALBUTEROL SULFATE 2.5 MG: 2.5 SOLUTION RESPIRATORY (INHALATION) at 02:05

## 2018-09-24 RX ADMIN — ALBUTEROL SULFATE 2.5 MG: 2.5 SOLUTION RESPIRATORY (INHALATION) at 19:13

## 2018-09-24 RX ADMIN — Medication 10 ML: at 05:05

## 2018-09-24 RX ADMIN — VENLAFAXINE 25 MG: 25 TABLET ORAL at 21:41

## 2018-09-24 RX ADMIN — ATORVASTATIN CALCIUM 40 MG: 40 TABLET, FILM COATED ORAL at 08:33

## 2018-09-24 RX ADMIN — PERFLUTREN 1 ML: 6.52 INJECTION, SUSPENSION INTRAVENOUS at 14:00

## 2018-09-24 RX ADMIN — TIOTROPIUM BROMIDE 18 MCG: 18 CAPSULE ORAL; RESPIRATORY (INHALATION) at 09:21

## 2018-09-24 RX ADMIN — SODIUM CHLORIDE 50 ML/HR: 900 INJECTION, SOLUTION INTRAVENOUS at 05:01

## 2018-09-24 RX ADMIN — PANTOPRAZOLE SODIUM 40 MG: 40 TABLET, DELAYED RELEASE ORAL at 08:34

## 2018-09-24 RX ADMIN — ALBUTEROL SULFATE 2.5 MG: 2.5 SOLUTION RESPIRATORY (INHALATION) at 14:54

## 2018-09-24 RX ADMIN — LEVOTHYROXINE SODIUM 25 MCG: 50 TABLET ORAL at 08:33

## 2018-09-24 RX ADMIN — BUDESONIDE 500 MCG: 0.5 INHALANT RESPIRATORY (INHALATION) at 19:13

## 2018-09-24 RX ADMIN — METHYLPREDNISOLONE SODIUM SUCCINATE 60 MG: 125 INJECTION, POWDER, FOR SOLUTION INTRAMUSCULAR; INTRAVENOUS at 21:41

## 2018-09-24 NOTE — H&P
[facility] Cardiology History & Physical  
  
Date of  Admission: 9/23/2018  9:14 PM  
 
Primary Care Physician: Dr. Suanne Galeazzi Primary Cardiologist: Dr. Daniel Moseley Admitting Physician: Dr. Conchis Gonzalez CC: 
 
HPI:  Kelsi Arora is a 68 y.o.  male with PMHx of CAD s/p CABG (1996 with SVG-LAD and dRCA), AS s/p [employer] mechanical AVR, tobacco abuse, COPD, HTN, dyslipidemia, CVA 2004 and hypothyroidism who presented to [facility] today with c/o CP with radiation to his L arm. States that he was dc from [facility] on 9/21 after admission for NSTEMI and LHC with multivessel PCI. He states he felt well on the PM of discharge. However, on the AM of 9/22 states he began to have recurrent CP and SOB. States that he took multiple SL NTG throughout the day. He would take a SL NTG and CP would decrease -- not resolve -- when it would start to reoccur he was take another SL NTG. Pt denies any fevers, chills, sweats, HA, vision changes, palpitations, syncope, N/V/D. Reports felt weak and tired. Not eating much. States he has some SOB at baseline but SOB over past 24hrs worse. No cough or congestion, states has been wheezing more. Denies new/worsening edema. When CP persisted into today his son insisted he go to the ED. He reports compliance with meds. States he thought he would feel better after the [facility] and PCI. In the ED at [facility] Pt was given 324mg ASA and IV Morphine. His BP was low and he was then given 1.5L NS bolus with some improvement. Wife  was started on Lisinopril on dc post cath and this causing BP to run low as well. Labs revealed trop 0.60, K 3.9, Mag 2.2, Cr 1.12, WBCs 14.1, , CXR (read states no acute process, normal pulmonary vasculature). Due to recent PCI he was transferred to [facility]. Prior admit: Pt presented with severe CP lasted 45 minutes - pressure - began at rest - resolved with NTG - deep lateral st depression resolved with NTG - radiated to left arm. Peak troponin was noted to be 2.85. [facility] with Dr. Yu Whitman: 
90% stenosis of the LM that was stented with a 3.0 x 12-mm Synergy DARBY 80% stenosis of the left circumflex that was stented with a 2.5 x 38-mm and 3.0 x 38-mm Synergy DARBY 80% stenosis of the SVG to LAD that was stented with 5.0 x 22-mm Abdullahi DARBY Past Medical History:  
Diagnosis Date  Aortic stenosis 10/22/2015  Chest pain 10/22/2015  COPD (chronic obstructive pulmonary disease) (HCC)  Coronary artery disease  Coronary atherosclerosis of native coronary artery 10/22/2015  CVA (cerebrovascular accident) ([facility] [address] 2004  
 right hemispheric  Essential hypertension 10/22/2015  Mixed hyperlipidemia 10/22/2015  Tobacco use disorder 10/22/2015 Past Surgical History:  
Procedure Laterality Date [address] [facility]  Allergies Allergen Reactions  Advair Diskus [Fluticasone-Salmeterol] Unknown (comments)  
  rash Social History Social History  Marital status:  Spouse name: N/A  
 Number of children: N/A  
 Years of education: N/A Occupational History  Not on file. Social History Main Topics  Smoking status: Current Every Day Smoker  Smokeless tobacco: Never Used Comment: trying to quit  Alcohol use No  
 Drug use: Not on file  Sexual activity: Not on file Other Topics Concern  Not on file Social History Narrative No family history on file. Current Facility-Administered Medications Medication Dose Route Frequency  [START ON 9/24/2018] aspirin delayed-release tablet 81 mg  81 mg Oral DAILY  [START ON 9/24/2018] atorvastatin (LIPITOR) tablet 40 mg  40 mg Oral DAILY  [START ON 9/24/2018] clopidogrel (PLAVIX) tablet 75 mg  75 mg Oral DAILY  [START ON 9/24/2018] pantoprazole (PROTONIX) tablet 40 mg  40 mg Oral DAILY  HYDROcodone-acetaminophen (NORCO) 7.5-325 mg per tablet 1 Tab  1 Tab Oral Q4H PRN  
  [START ON 9/24/2018] levothyroxine (SYNTHROID) tablet 25 mcg  25 mcg Oral ACB  [START ON 9/24/2018] lisinopril (PRINIVIL, ZESTRIL) tablet 2.5 mg  2.5 mg Oral DAILY  [START ON 9/24/2018] metoprolol succinate (TOPROL-XL) XL tablet 25 mg  25 mg Oral DAILY  nitroglycerin (NITROSTAT) tablet 0.4 mg  0.4 mg SubLINGual Q5MIN PRN  
 [START ON 9/24/2018] predniSONE (DELTASONE) tablet 5 mg  5 mg Oral BID  [START ON 9/24/2018] tamsulosin (FLOMAX) capsule 0.4 mg  0.4 mg Oral DAILY  [START ON 9/24/2018] tiotropium (SPIRIVA) inhalation capsule 18 mcg  1 Cap Inhalation DAILY  [START ON 9/24/2018] topiramate (TOPAMAX) tablet 100 mg  100 mg Oral DAILY  venlafaxine (EFFEXOR) tablet 25 mg  25 mg Oral QHS  acetaminophen (TYLENOL) tablet 650 mg  650 mg Oral Q4H PRN  
 alum-mag hydroxide-simeth (MYLANTA) oral suspension 30 mL  30 mL Oral QID PRN  
 diphenhydrAMINE (BENADRYL) capsule 25 mg  25 mg Oral Q6H PRN  
 LORazepam (ATIVAN) tablet 1 mg  1 mg Oral Q4H PRN  
 morphine injection 2 mg  2 mg IntraVENous Q4H PRN  
 ondansetron (ZOFRAN) injection 4 mg  4 mg IntraVENous Q4H PRN  
 sodium chloride (NS) flush 5-10 mL  5-10 mL IntraVENous Q8H  
 sodium chloride (NS) flush 5-10 mL  5-10 mL IntraVENous PRN  
 [START ON 9/24/2018] nitroglycerin (NITROBID) 2 % ointment 1 Inch  1 Inch Topical Q6H  
 naloxone (NARCAN) injection 0.4 mg  0.4 mg IntraVENous PRN  
 [START ON 9/24/2018] 0.9% sodium chloride infusion  50 mL/hr IntraVENous CONTINUOUS  
 heparin 25,000 units in dextrose 500 mL infusion  12-25 Units/kg/hr IntraVENous TITRATE  budesonide (PULMICORT) 500 mcg/2 ml nebulizer suspension  500 mcg Nebulization BID RT  
 [START ON 9/24/2018] albuterol (PROVENTIL VENTOLIN) nebulizer solution 2.5 mg  2.5 mg Nebulization Q6H RT  
 
 
Review of Systems Review of Systems Constitution: Positive for decreased appetite, weakness and malaise/fatigue.  Negative for chills, diaphoresis, fever, weight gain and weight loss. HENT: Negative. Negative for congestion and stridor. Eyes: Negative. Negative for blurred vision, double vision and visual disturbance. Cardiovascular: Positive for chest pain, dyspnea on exertion and orthopnea. Negative for irregular heartbeat, leg swelling, near-syncope, palpitations, paroxysmal nocturnal dyspnea and syncope. Sleeps in recliner -- chronic Respiratory: Positive for shortness of breath, sleep disturbances due to breathing and wheezing. Negative for cough and sputum production. Endocrine: Negative. Hematologic/Lymphatic: Bruises/bleeds easily. Skin: Negative. Musculoskeletal: Negative. Gastrointestinal: Negative for abdominal pain, change in bowel habit, nausea and vomiting. Genitourinary: Negative for dysuria, frequency and urgency. Neurological: Negative for dizziness, headaches, loss of balance, numbness, seizures and tremors. Psychiatric/Behavioral: Positive for substance abuse. Smoker Subjective:  
 
Visit Vitals  BP 97/58 (BP 1 Location: Left arm, BP Patient Position: At rest)  Pulse 94  Temp 97.9 °F (36.6 °C)  Resp 22  
 Ht 5' 8\" (1.727 m)  Wt 94.8 kg (209 lb)  SpO2 93%  BMI 31.78 kg/m2 Physical Exam  
Constitutional: He is oriented to person, place, and time. Vital signs are normal. He appears not malnourished and not jaundiced. He appears unhealthy. No distress. HENT:  
Head: Normocephalic and atraumatic. Nose: Nose normal.  
Mouth/Throat: Oropharynx is clear and moist.  
Eyes: Conjunctivae and EOM are normal. Pupils are equal, round, and reactive to light. No scleral icterus. Neck: Normal range of motion. Neck supple. No JVD present. No tracheal deviation present. Cardiovascular: Normal rate, regular rhythm, normal heart sounds and intact distal pulses. Exam reveals no friction rub. No murmur heard. +valve click Pulmonary/Chest: No stridor. Still SOB, increased WOB, on NC O2, +wheezing, BBS overall diminished with decreased air movement Abdominal: Soft. Bowel sounds are normal. He exhibits no distension. There is no tenderness. obese Musculoskeletal: Normal range of motion. He exhibits no edema. Neurological: He is alert and oriented to person, place, and time. No cranial nerve deficit. Skin: Skin is warm and dry. He is not diaphoretic. Large areas of ecchymosis to bilat arms Psychiatric: Mood, memory, affect and judgment normal.  
 
 
Cardiographics Telemetry: SR 80s ECG: reviewed from [facility], similar to post-cath EKG here, no acute ST changes Echocardiogram: 9/19/18 SUMMARY: 
-  Left ventricle: Systolic function was normal. Ejection fraction was 
estimated in the range of 55 % to 60 %. There were no regional wall motion 
abnormalities. There was mild concentric hypertrophy. -  Left atrium: The atrium was mildly dilated. -  Inferior vena cava, hepatic veins: The respirophasic change in diameter Was less than 50%. -  Aortic valve: The aortic valve area by the continuity equation was 1.4 cm2. 
-  Mitral valve: There was mild annular calcification. There was mild 
regurgitation. -  Tricuspid valve: There was mild regurgitation. Labs: No results found for this or any previous visit (from the past 24 hour(s)). -- reviewed from [facility] Patient has been seen and examined by Dr. Scotty Snellen and he agrees with the following assessment and plan: 
 
 Assessment/Plan:  
  
 Principal Problem: 
  Chest pain -- admit to tele, serial Lynette, topical NTG as BP allows, cont DAPT, heparin bolus/gtt, NPO at MN for poss repeat [facility] Active Problems: 
  Coronary atherosclerosis of native coronary artery -- cont DAPT, BB, ACEI and statin -- watch BP, may have to hold meds pending BP tolerance Mixed hyperlipidemia -- cont statin Tobacco use disorder -- needs cessation   Essential hypertension -- hypotensive tonight, monitor BPs and adjust meds as indicated S/P AVR (aortic valve replacement) -- mechanical valve, on chronic Coumadin, last INR 2.1 on 9/21, will check daily INRs, hold Coumadin tonight for poss LHC, start heparin gtt COPD (chronic obstructive pulmonary disease) -- more SOB and actively wheezing -- will order nebs and give RT tx now, cont home inhalers and steroids, consider Pulm eval for poss COPD exacerbation pending clinical course Acquired hypothyroidism -- cont Synthroid Jigna Vanessa NP 
9/23/2018 11:00 PM

## 2018-09-24 NOTE — PROGRESS NOTES
Patient arrived to floor via EMS stretcher transfer from [facility]. Patient complains of chest pain 6/10 on admission. David Fofana NP notified of patient's arrival and of chest pain, awaiting orders. Bed locked in the lowest position, call light and belongings within reach, girlfriend at bedside, patient oriented to room. Will continue to monitor.

## 2018-09-24 NOTE — CONSULTS
PULMONARY/CCM CONSULT :  9/24/2018     Date of Admission:  9/23/2018     The patient's chart has been reviewed and the chart has been discussed with nursing staff. Subjective:      This patient has been seen and evaluated at the request of Dr. Donta Garcia.      Patient is a 68 y.o. male presents with .  Niko Valdez a 68 y.o.  male with CAD s/p CABG (1996 with SVG-LAD and dRCA), AS s/p St Sigifred[employer] mechanical AVR,  tobacco abuse, documented COPD, CVA 2004 and tobacco abuse. He presented with angina at [address] [place] [address],[address] with multivessel PCI after recent admission 9/19/18-9/22/18. We have not seen him before and he has no PFTs on file. He is on spiriva outpatient but denies using other inhaled medications. He is followed by Dr Stephen Regalado. He has diagnosed JON with CPAP machine but hasn't wore in ~3-4 years. He reports smoking cessation last week. He has a nebulizer but does not use as it makes his breathing \"worse. \" He is not on home oxygen. He is obese and can walk to the bathroom and back. He is on prednisone 5 mg daily and on coumadin for his Aortic valve. After his recent discharge, he reports having worsening SOB with chest pain that radiates down his left arm that was unrelieved by NTG. He was transferred from [facility] for further evaluation and treatment. We were asked to see him for Acute Exacerbation of COPD.             Past Medical History:   Diagnosis Date    Aortic stenosis 10/22/2015    Chest pain 10/22/2015    COPD (chronic obstructive pulmonary disease) (HCC)      Coronary artery disease      Coronary atherosclerosis of native coronary artery 10/22/2015    CVA ([facility]) 2004     right hemispheric    Essential hypertension 10/22/2015    Mixed hyperlipidemia 10/22/2015    Tobacco use disorder 10/22/2015            Past Surgical History:   Procedure Laterality Date    HX CORONARY ARTERY BYPASS GRAFT   1996             Social History   Substance Use Topics    Smoking status: Current Every Day Smoker    Smokeless tobacco: Never Used         Comment: trying to quit    Alcohol use No      No family history on file. Allergies   Allergen Reactions    Advair Diskus [Fluticasone-Salmeterol] Unknown (comments)       rash      Prior to Admission Medications   Prescriptions Last Dose Informant Patient Reported? Taking? Dexlansoprazole 60 mg CpDB     Yes No   Sig: Take  by mouth. HYDROcodone-acetaminophen (NORCO) 7.5-325 mg per tablet     Yes No   Sig: Take  by mouth. MAGNESIUM PO     Yes No   Sig: Take  by mouth. VIT A/C/E AC/ZNOX/CUPRIC OXIDE (EYE VITAMIN AND MINERALS PO)     Yes No   Sig: Take  by mouth. aspirin delayed-release 81 mg tablet     Yes No   Sig: Take  by mouth daily. atorvastatin (LIPITOR) 40 mg tablet     Yes No   Sig: Take  by mouth daily. clopidogrel (PLAVIX) 75 mg tab     No No   Sig: Take 1 Tab by mouth daily. fish oil-omega-3 fatty acids 340-1,000 mg capsule     Yes No   Sig: Take 1 Cap by mouth daily. isosorbide mononitrate ER (IMDUR) 30 mg tablet     Yes No   Sig: Take  by mouth daily. levothyroxine (SYNTHROID) 25 mcg tablet     Yes No   Sig: Take  by mouth Daily (before breakfast). lisinopril (PRINIVIL, ZESTRIL) 2.5 mg tablet     No No   Sig: Take 1 Tab by mouth daily. metoprolol succinate (TOPROL-XL) 25 mg XL tablet     No No   Sig: Take 1 Tab by mouth daily. nitroglycerin (NITROSTAT) 0.4 mg SL tablet     No No   Si Tab by SubLINGual route every five (5) minutes as needed for Chest Pain. Up to 3 doses. omeprazole (PRILOSEC) 40 mg capsule     Yes No   Sig: Take 40 mg by mouth daily. potassium 99 mg tablet     Yes No   Sig: Take 99 mg by mouth daily. predniSONE (DELTASONE) 1 mg tablet     Yes No   Sig: Take 5 mg by mouth two (2) times a day. Indications: breathing   tamsulosin (FLOMAX) 0.4 mg capsule     Yes No   Sig: Take 0.4 mg by mouth daily.    tiotropium (SPIRIVA) 18 mcg inhalation capsule     Yes No   Sig: Take 1 Cap by inhalation daily. topiramate (TOPAMAX) 100 mg tablet     Yes No   Sig: Take 100 mg by mouth daily. venlafaxine (EFFEXOR) 75 mg tablet     Yes No   Sig: Take 25 mg by mouth nightly. warfarin (COUMADIN) 1 mg tablet     Yes No   Sig: Take 1 mg by mouth daily.  Brand name       Facility-Administered Medications: None         MEDS SCHEDULED:           Current Facility-Administered Medications   Medication Dose Route Frequency    influenza vaccine 2018-19 (6 mos+)(PF) (FLUARIX QUAD/FLULAVAL QUAD) injection 0.5 mL  0.5 mL IntraMUSCular PRIOR TO DISCHARGE    aspirin delayed-release tablet 81 mg  81 mg Oral DAILY    atorvastatin (LIPITOR) tablet 40 mg  40 mg Oral DAILY    clopidogrel (PLAVIX) tablet 75 mg  75 mg Oral DAILY    pantoprazole (PROTONIX) tablet 40 mg  40 mg Oral DAILY    HYDROcodone-acetaminophen (NORCO) 7.5-325 mg per tablet 1 Tab  1 Tab Oral Q4H PRN    levothyroxine (SYNTHROID) tablet 25 mcg  25 mcg Oral ACB    nitroglycerin (NITROSTAT) tablet 0.4 mg  0.4 mg SubLINGual Q5MIN PRN    predniSONE (DELTASONE) tablet 5 mg  5 mg Oral BID    tamsulosin (FLOMAX) capsule 0.4 mg  0.4 mg Oral DAILY    tiotropium (SPIRIVA) inhalation capsule 18 mcg  1 Cap Inhalation DAILY    topiramate (TOPAMAX) tablet 100 mg  100 mg Oral DAILY    venlafaxine (EFFEXOR) tablet 25 mg  25 mg Oral QHS    acetaminophen (TYLENOL) tablet 650 mg  650 mg Oral Q4H PRN    alum-mag hydroxide-simeth (MYLANTA) oral suspension 30 mL  30 mL Oral QID PRN    diphenhydrAMINE (BENADRYL) capsule 25 mg  25 mg Oral Q6H PRN    LORazepam (ATIVAN) tablet 1 mg  1 mg Oral Q4H PRN    morphine injection 2 mg  2 mg IntraVENous Q4H PRN    ondansetron (ZOFRAN) injection 4 mg  4 mg IntraVENous Q4H PRN    sodium chloride (NS) flush 5-10 mL  5-10 mL IntraVENous Q8H    sodium chloride (NS) flush 5-10 mL  5-10 mL IntraVENous PRN    naloxone (NARCAN) injection 0.4 mg  0.4 mg IntraVENous PRN    budesonide (PULMICORT) 500 mcg/2 ml nebulizer suspension  500 mcg Nebulization BID RT    albuterol (PROVENTIL VENTOLIN) nebulizer solution 2.5 mg  2.5 mg Nebulization Q6H RT            Review of Systems  Constitutional: positive for fatigue  Respiratory: positive for cough, sputum or dyspnea on exertion, negative for hemoptysis or chronic bronchitis  Cardiovascular: positive for chest pressure/discomfort, negative for palpitations, irregular heart beats, near-syncope, syncope  Gastrointestinal: positive for weight gain, negative for nausea, vomiting, change in bowel habits, melena, diarrhea, constipation, abdominal pain and jaundice  Musculoskeletal:positive for limited mobility, obesity     Objective:             Vitals:     09/24/18 0205 09/24/18 0443 09/24/18 0926 09/24/18 0937   BP:   92/58   131/81   Pulse:   66   67   Resp:   20   20   Temp:   97.9 °F (36.6 °C)   97.9 °F (36.6 °C)   SpO2: 91% 91% 94% 98%   Weight:   208 lb 6.4 oz (94.5 kg)       Height:                    PHYSICAL EXAM      Physical Exam:   General:  Alert, cooperative, no acute distress, obese, appears unhealthy   Eyes:  Conjunctivae/corneas clear. Nose: Nares patent and moist    Mouth/Throat: Lips, mucosa, and tongue pink and intact. Neck: Supple, symmetrical.   Respiratory:   Scattered wheezes to auscultation bilaterally on 2-3 lpm   Cardiovascular:  Regular rate and rhythm, S1, S2, no murmur, click, rub or gallop. GI:   Abdomen soft, non-tender. Bowel sounds active X 4 Q. Musculoskeletal: Extremities symmetrical, atraumatic, no cyanosis, no edema. Pulses: 2+ and symmetric all extremities. Skin: Skin color, texture, turgor normal. No rashes or lesions         Neurologic: 2+ strength bilateral upper and lower extremities, sensation throughout appropriate.   Alert and oriented.         Activity: with assistance  Nutrition:cardiac     CHEST X-RAYS:   Previous admission on 9/19/2018       CULTURES:none     LABS         Recent Labs       09/24/18   0353   WBC 11. 1   HGB  12.0*   HCT  37.1*   PLT  192            Recent Labs       09/24/18   0917  09/24/18   0353  09/23/18   2235   NA   --   143   --    K   --   3.9   --    CL   --   112*   --    GLU   --   126*   --    CO2   --   21   --    BUN   --   16   --    CREA   --   1.23   --    MG   --   2.2   --    TROIQ  Elevated troponin which has been previously called. 0.41*  0.40*   INR   --   2.6  2.1      No results for input(s): PH, PCO2, PO2, HCO3 in the last 72 hours.     Assessment:      Hospital Problems  Date Reviewed: 7/13/2016             Codes Class Noted POA     Acquired hypothyroidism (Chronic) ICD-10-CM: E03.9  ICD-9-CM: 581. 9   9/23/2018 Yes           COPD (chronic obstructive pulmonary disease) (HCC) (Chronic) ICD-10-CM: J44.9  ICD-9-CM: 496   9/19/2018 Yes      on chronic prednisone, not on home O2  Smoked up until 1 week ago     S/P AVR (aortic valve replacement) (Chronic) ICD-10-CM: Z95.2  ICD-9-CM: V43.3   5/25/2016 Yes      on chronic coumadin therapy     Coronary atherosclerosis of native coronary artery (Chronic) ICD-10-CM: I25.10  ICD-9-CM: 414.01   10/22/2015 Yes           Mixed hyperlipidemia (Chronic) ICD-10-CM: A22.6  ICD-9-CM: 272.2   10/22/2015 Yes           Tobacco use disorder (Chronic) ICD-10-CM: F17.200  ICD-9-CM: 305. 1   10/22/2015 Yes      reportedly quit last week     Essential hypertension (Chronic) ICD-10-CM: I10  ICD-9-CM: 401. 9   10/22/2015 Yes           * (Principal)Chest pain ICD-10-CM: R07.9  ICD-9-CM: 786.50   10/22/2015 Yes                  Plan:      -Smoking cessation  -Only on spiriva O/P, has a nebulizer but no meds. Continue albuterol, spiriva and pulmicort  -Followed by Dr Haseeb Zhao  -Hx JON with CPAP but hasn't worn in ~ 4 years. Suspect he needs to have repeat PSG if willing. OMEGA here when AECOPD improved  -Add IV steroids.  He is on prednisone 5 mg daily O/P for COPD  -Assess O2 needs PTD     Porfirio Kerns, NP-C     Lungs:  Wheezing   Heart:  RRR with no Murmur/Rubs/Gallops    Additional Comments: Will treat for COPD exacerbation with iv steroids, nebs and ABX, she does follow up with  at [facility] and plans to follow there    I have spoken with and examined the patient. I agree with the above assessment and plan as documented.     Lance Storm MD

## 2018-09-24 NOTE — PROGRESS NOTES
Care Management Interventions PCP Verified by CM: Yes 
Palliative Care Criteria Met (RRAT>21 & CHF Dx)?: No (RRAT 22 Dx Chest pain) Transition of Care Consult (CM Consult): Discharge Planning Discharge Durable Medical Equipment: No 
Physical Therapy Consult: No 
Occupational Therapy Consult: No 
Speech Therapy Consult: No 
Current Support Network: Lives with Spouse Confirm Follow Up Transport: Family Plan discussed with Pt/Family/Caregiver: Yes Freedom of Choice Offered: Yes Discharge Location Discharge Placement: Home Met with patient for d/c planning. Patient alert and oriented x 3, independent of ADL's and lives with his wife. He was recently d/c 9/21/18. He has a cane at home to use as needed. His family provides transportation for his follow up visits. He has [facility] and is able to obtain his medications without difficulty. His PCP is Dr. Ирина Beyer. Patient states he had chest pain that was unrelieved with NTG and went to [facility] then was transferred here for follow up. Patient states they are considering re-cath but unsure if they will need to do it. Patient states plans to d/c home when medically stable. Declined home health follow up at d/c but will think about it if decides he needs it. Current d/c plan is home with wife.

## 2018-09-24 NOTE — PROGRESS NOTES
Bedside and Verbal shift change report given to Self and Chanda Mahoney (oncoming nurse) by Ceci Brewer RN (offgoing nurse). Report included the following information SBAR, Kardex, Procedure Summary, Intake/Output, Recent Results and Cardiac Rhythm NSR. Heparin gtt verified with off going RN.

## 2018-09-24 NOTE — PROGRESS NOTES
Bedside and Verbal shift change report given to Reginaldo Rivers RN (oncoming nurse) by Self and Chanda Wasserman (offgoing nurse). Report included the following information SBAR, Kardex, ED Summary, [place] and Recent Results.

## 2018-09-24 NOTE — PROGRESS NOTES
Patient has bruising on right groin and left wrist due to catherizations done on 9/21 and blanchable redness in gluteal fold. Patient also has BUE bruising. No open wounds noted.

## 2018-09-25 LAB
ANION GAP SERPL CALC-SCNC: 11 MMOL/L (ref 7–16)
BUN SERPL-MCNC: 13 MG/DL (ref 8–23)
CALCIUM SERPL-MCNC: 8 MG/DL (ref 8.3–10.4)
CHLORIDE SERPL-SCNC: 113 MMOL/L (ref 98–107)
CO2 SERPL-SCNC: 19 MMOL/L (ref 21–32)
CREAT SERPL-MCNC: 1.1 MG/DL (ref 0.8–1.5)
ERYTHROCYTE [DISTWIDTH] IN BLOOD BY AUTOMATED COUNT: 14.1 %
GLUCOSE SERPL-MCNC: 174 MG/DL (ref 65–100)
HCT VFR BLD AUTO: 39.3 % (ref 41.1–50.3)
HGB BLD-MCNC: 12.7 G/DL (ref 13.6–17.2)
INR PPP: 2.5
MAGNESIUM SERPL-MCNC: 2.2 MG/DL (ref 1.8–2.4)
MCH RBC QN AUTO: 31.5 PG (ref 26.1–32.9)
MCHC RBC AUTO-ENTMCNC: 32.3 G/DL (ref 31.4–35)
MCV RBC AUTO: 97.5 FL (ref 79.6–97.8)
NRBC # BLD: 0 K/UL (ref 0–0.2)
PLATELET # BLD AUTO: 214 K/UL (ref 150–450)
PMV BLD AUTO: 10.6 FL (ref 9.4–12.3)
POTASSIUM SERPL-SCNC: 4 MMOL/L (ref 3.5–5.1)
PROTHROMBIN TIME: 25.6 SEC (ref 11.5–14.5)
RBC # BLD AUTO: 4.03 M/UL (ref 4.23–5.6)
SODIUM SERPL-SCNC: 143 MMOL/L (ref 136–145)
WBC # BLD AUTO: 10 K/UL (ref 4.3–11.1)

## 2018-09-25 PROCEDURE — 85027 COMPLETE CBC AUTOMATED: CPT

## 2018-09-25 PROCEDURE — 94760 N-INVAS EAR/PLS OXIMETRY 1: CPT

## 2018-09-25 PROCEDURE — 74011250636 HC RX REV CODE- 250/636: Performed by: INTERNAL MEDICINE

## 2018-09-25 PROCEDURE — 36415 COLL VENOUS BLD VENIPUNCTURE: CPT

## 2018-09-25 PROCEDURE — 77010033678 HC OXYGEN DAILY

## 2018-09-25 PROCEDURE — 94640 AIRWAY INHALATION TREATMENT: CPT

## 2018-09-25 PROCEDURE — 83735 ASSAY OF MAGNESIUM: CPT

## 2018-09-25 PROCEDURE — 85610 PROTHROMBIN TIME: CPT

## 2018-09-25 PROCEDURE — 65660000000 HC RM CCU STEPDOWN

## 2018-09-25 PROCEDURE — 74011250637 HC RX REV CODE- 250/637: Performed by: NURSE PRACTITIONER

## 2018-09-25 PROCEDURE — 80048 BASIC METABOLIC PNL TOTAL CA: CPT

## 2018-09-25 PROCEDURE — 74011000250 HC RX REV CODE- 250: Performed by: NURSE PRACTITIONER

## 2018-09-25 PROCEDURE — 99232 SBSQ HOSP IP/OBS MODERATE 35: CPT | Performed by: INTERNAL MEDICINE

## 2018-09-25 PROCEDURE — 74011250637 HC RX REV CODE- 250/637: Performed by: PHYSICIAN ASSISTANT

## 2018-09-25 RX ORDER — AZITHROMYCIN 250 MG/1
500 TABLET, FILM COATED ORAL DAILY
Status: DISCONTINUED | OUTPATIENT
Start: 2018-09-26 | End: 2018-09-26 | Stop reason: HOSPADM

## 2018-09-25 RX ORDER — PREDNISONE 20 MG/1
40 TABLET ORAL
Status: DISCONTINUED | OUTPATIENT
Start: 2018-09-26 | End: 2018-09-26 | Stop reason: HOSPADM

## 2018-09-25 RX ORDER — GUAIFENESIN 100 MG/5ML
100 SOLUTION ORAL
Status: DISCONTINUED | OUTPATIENT
Start: 2018-09-25 | End: 2018-09-26 | Stop reason: HOSPADM

## 2018-09-25 RX ADMIN — Medication 10 ML: at 21:57

## 2018-09-25 RX ADMIN — HYDROCODONE BITARTRATE AND ACETAMINOPHEN 1 TABLET: 7.5; 325 TABLET ORAL at 19:48

## 2018-09-25 RX ADMIN — ALBUTEROL SULFATE 2.5 MG: 2.5 SOLUTION RESPIRATORY (INHALATION) at 07:14

## 2018-09-25 RX ADMIN — CLOPIDOGREL BISULFATE 75 MG: 75 TABLET ORAL at 08:08

## 2018-09-25 RX ADMIN — PANTOPRAZOLE SODIUM 40 MG: 40 TABLET, DELAYED RELEASE ORAL at 08:08

## 2018-09-25 RX ADMIN — BUDESONIDE 500 MCG: 0.5 INHALANT RESPIRATORY (INHALATION) at 19:51

## 2018-09-25 RX ADMIN — LEVOTHYROXINE SODIUM 25 MCG: 50 TABLET ORAL at 06:09

## 2018-09-25 RX ADMIN — GUAIFENESIN 100 MG: 200 SOLUTION ORAL at 22:04

## 2018-09-25 RX ADMIN — TAMSULOSIN HYDROCHLORIDE 0.4 MG: 0.4 CAPSULE ORAL at 08:08

## 2018-09-25 RX ADMIN — TOPIRAMATE 100 MG: 100 TABLET, FILM COATED ORAL at 08:08

## 2018-09-25 RX ADMIN — BUDESONIDE 500 MCG: 0.5 INHALANT RESPIRATORY (INHALATION) at 07:14

## 2018-09-25 RX ADMIN — Medication 5 ML: at 06:10

## 2018-09-25 RX ADMIN — ASPIRIN 81 MG: 81 TABLET, COATED ORAL at 08:09

## 2018-09-25 RX ADMIN — VENLAFAXINE 25 MG: 25 TABLET ORAL at 21:56

## 2018-09-25 RX ADMIN — HYDROCODONE BITARTRATE AND ACETAMINOPHEN 1 TABLET: 7.5; 325 TABLET ORAL at 08:13

## 2018-09-25 RX ADMIN — METHYLPREDNISOLONE SODIUM SUCCINATE 60 MG: 125 INJECTION, POWDER, FOR SOLUTION INTRAMUSCULAR; INTRAVENOUS at 08:08

## 2018-09-25 RX ADMIN — METHYLPREDNISOLONE SODIUM SUCCINATE 60 MG: 125 INJECTION, POWDER, FOR SOLUTION INTRAMUSCULAR; INTRAVENOUS at 21:56

## 2018-09-25 RX ADMIN — TIOTROPIUM BROMIDE 18 MCG: 18 CAPSULE ORAL; RESPIRATORY (INHALATION) at 07:14

## 2018-09-25 RX ADMIN — ALBUTEROL SULFATE 2.5 MG: 2.5 SOLUTION RESPIRATORY (INHALATION) at 13:34

## 2018-09-25 RX ADMIN — ATORVASTATIN CALCIUM 40 MG: 40 TABLET, FILM COATED ORAL at 08:08

## 2018-09-25 RX ADMIN — ALBUTEROL SULFATE 2.5 MG: 2.5 SOLUTION RESPIRATORY (INHALATION) at 01:45

## 2018-09-25 RX ADMIN — Medication 5 ML: at 21:57

## 2018-09-25 RX ADMIN — ALBUTEROL SULFATE 2.5 MG: 2.5 SOLUTION RESPIRATORY (INHALATION) at 19:51

## 2018-09-25 RX ADMIN — AZITHROMYCIN MONOHYDRATE 500 MG: 500 INJECTION, POWDER, LYOPHILIZED, FOR SOLUTION INTRAVENOUS at 16:25

## 2018-09-25 NOTE — PROGRESS NOTES
Bedside and Verbal shift change report given to self (oncoming nurse) by Rupert Curiel RN (offgoing nurse). Report included the following information SBAR, Kardex, MAR and Recent Results.

## 2018-09-25 NOTE — PROGRESS NOTES
Problem: Falls - Risk of 
Goal: *Absence of Falls Document Judd Jose Fall Risk and appropriate interventions in the flowsheet. Outcome: Progressing Towards Goal 
Fall Risk Interventions: 
Mobility Interventions: Patient to call before getting OOB, Bed/chair exit alarm Medication Interventions: Bed/chair exit alarm, Patient to call before getting OOB Elimination Interventions: Call light in reach, Bed/chair exit alarm, Patient to call for help with toileting needs

## 2018-09-25 NOTE — PROGRESS NOTES
[facility] CARDIOLOGY PROGRESS NOTE 
      
 
9/25/2018 7:41 AM 
 
Admit Date: 9/23/2018 Subjective:  
Appears clinically improved with treatment for COPD. Troponins trending down for MI last week and echo with normal LVEF. No additional CP since treatment for COPD. 
 
ROS: 
Cardiovascular:  As noted above Objective:  
  
Vitals:  
 09/25/18 0036 09/25/18 0145 09/25/18 0540 09/25/18 0669 BP: 121/80  117/64 Pulse: 81  81 Resp: 20  18 Temp: 98 °F (36.7 °C)  97.6 °F (36.4 °C) SpO2: 96% 95% 99% 97% Weight:      
Height:      
 
 
Physical Exam: 
General-No Acute Distress Neck- supple, no JVD 
CV- regular rate and rhythm no MRG Lung- wheeze bilaterally Abd- soft, nontender, nondistended Ext- no edema bilaterally. Skin- warm and dry Data Review:  
Recent Labs  
   09/25/18 
 8749  09/24/18 
 9030  09/24/18 
 3392 NA  143   --   143  
K  4.0   --   3.9 MG  2.2   --   2.2 BUN  13   --   16  
CREA  1.10   --   1.23  
GLU  174*   --   126* WBC  10.0   --   11.1 HGB  12.7*   --   12.0*  
HCT  39.3*   --   37.1*  
PLT  214   --   192 INR  2.5   --   2.6  
TROIQ   --   Elevated troponin which has been previously called. 0.41* Assessment/Plan:  
 
Principal Problem: 
  Chest pain (10/22/2015) Suspect due to COPD at this time. EF is normal and troponins consistent with MI last week. Active Problems: 
  Coronary atherosclerosis of native coronary artery (10/22/2015) Complex CAD and medical therapy appropriate. Mixed hyperlipidemia (10/22/2015) On statin therapy Tobacco use disorder (10/22/2015) Cessation education Essential hypertension (10/22/2015) This is well controlled S/P AVR (aortic valve replacement) (5/25/2016) mAVR and normal function. Holding warfarin COPD (chronic obstructive pulmonary disease) ([facility] [address] (9/19/2018) Severe and appreciate pulmonary help.   Suspect most of symptoms currently are due to COPD. Certainly has complex CAD but echo with normal LVEF and troponins consistent with MI last week. Acquired hypothyroidism (9/23/2018) Chronic Anticoagulated on Coumadin (9/24/2018) INR 2.5 Current chronic use of systemic steroids (9/24/2018) Per pulmonary Kylie Izaguirre MD 
9/25/2018 7:41 AM

## 2018-09-25 NOTE — PROGRESS NOTES
Bedside and Verbal shift change report given to Maisha Sinclair RN (oncoming nurse) by self Antonio ríos). Report included the following information SBAR, Kardex, MAR and Recent Results.

## 2018-09-25 NOTE — PROGRESS NOTES
Bedside and Verbal shift change report given to self (oncoming nurse) by Jennifer Samano RN (offgoing nurse). Report included the following information SBAR, Kardex, MAR and Recent Results.

## 2018-09-25 NOTE — PROGRESS NOTES
Problem: Falls - Risk of 
Goal: *Absence of Falls Document Raquel Townsend Fall Risk and appropriate interventions in the flowsheet. Outcome: Progressing Towards Goal 
Fall Risk Interventions: 
Mobility Interventions: Patient to call before getting OOB Medication Interventions: Evaluate medications/consider consulting pharmacy, Patient to call before getting OOB, Teach patient to arise slowly Elimination Interventions: Patient to call for help with toileting needs, Elevated toilet seat, Call light in reach, Toilet paper/wipes in reach, Toileting schedule/hourly rounds Pt progressing towards goal. No falls since admission. Bed low and locked. Call light within reach. Side rails x 2. Gripper socks applied. Personal belongings within reach. Pt verbalizes understanding to call for assistance.

## 2018-09-25 NOTE — PROGRESS NOTES
Dual skin assessment with secondary RN - Sacrum visualized - red but blanchable, intact. Heels visualized - WDL. S/p right groin cath (9/21/2018) site ecchymotic. BUE marissa and ecchymotic. Otherwise, no abnormalities noted.

## 2018-09-25 NOTE — PROGRESS NOTES
Breanne Funk Admission Date: 9/23/2018 Daily Progress Note: 9/25/2018 The patient's chart is reviewed and the patient is discussed with the staff. Pt is a 69 yo male with with COPD on chronic prednisone, tobacco abuse, CAD s/p CABG, s/p mechanical AVR on chronic coumadin, and prior CVA. Pt was recently hospitalized for NSTEMI with multivessel PCIs. Pt was discharged 9/21. Pt presented to [facility] with chest pain and was transferred to [facility]. We were consulted to assist with COPD exacerbation management. He is seen by Dr. Mohan Valente for JON but is noncompliant. Subjective:  
 
Pt sitting up in bed on 2L O2. Pt is eating lunch. He reports that his breathing is better than it was yesterday. Pt is coughing up thick mucous. Current Facility-Administered Medications Medication Dose Route Frequency  influenza vaccine 2018-19 (6 mos+)(PF) (FLUARIX QUAD/FLULAVAL QUAD) injection 0.5 mL  0.5 mL IntraMUSCular PRIOR TO DISCHARGE  methylPREDNISolone (PF) (SOLU-MEDROL) injection 60 mg  60 mg IntraVENous Q12H  
 azithromycin (ZITHROMAX) 500 mg in 0.9% sodium chloride (MBP/ADV) 250 mL  500 mg IntraVENous Q24H  
 aspirin delayed-release tablet 81 mg  81 mg Oral DAILY  atorvastatin (LIPITOR) tablet 40 mg  40 mg Oral DAILY  clopidogrel (PLAVIX) tablet 75 mg  75 mg Oral DAILY  pantoprazole (PROTONIX) tablet 40 mg  40 mg Oral DAILY  HYDROcodone-acetaminophen (NORCO) 7.5-325 mg per tablet 1 Tab  1 Tab Oral Q4H PRN  
 levothyroxine (SYNTHROID) tablet 25 mcg  25 mcg Oral ACB  nitroglycerin (NITROSTAT) tablet 0.4 mg  0.4 mg SubLINGual Q5MIN PRN  
 tamsulosin (FLOMAX) capsule 0.4 mg  0.4 mg Oral DAILY  tiotropium (SPIRIVA) inhalation capsule 18 mcg  1 Cap Inhalation DAILY  topiramate (TOPAMAX) tablet 100 mg  100 mg Oral DAILY  venlafaxine (EFFEXOR) tablet 25 mg  25 mg Oral QHS  acetaminophen (TYLENOL) tablet 650 mg  650 mg Oral Q4H PRN  
  alum-mag hydroxide-simeth (MYLANTA) oral suspension 30 mL  30 mL Oral QID PRN  
 diphenhydrAMINE (BENADRYL) capsule 25 mg  25 mg Oral Q6H PRN  
 LORazepam (ATIVAN) tablet 1 mg  1 mg Oral Q4H PRN  
 morphine injection 2 mg  2 mg IntraVENous Q4H PRN  
 ondansetron (ZOFRAN) injection 4 mg  4 mg IntraVENous Q4H PRN  
 sodium chloride (NS) flush 5-10 mL  5-10 mL IntraVENous Q8H  
 sodium chloride (NS) flush 5-10 mL  5-10 mL IntraVENous PRN  
 naloxone (NARCAN) injection 0.4 mg  0.4 mg IntraVENous PRN  
 budesonide (PULMICORT) 500 mcg/2 ml nebulizer suspension  500 mcg Nebulization BID RT  
 albuterol (PROVENTIL VENTOLIN) nebulizer solution 2.5 mg  2.5 mg Nebulization Q6H RT  
 
 
Review of Systems 
+cough 
+shortness of breath Constitutional: negative for fever, chills, sweats Cardiovascular: negative for chest pain, palpitations, syncope, edema Gastrointestinal:  negative for dysphagia, reflux, vomiting, diarrhea, abdominal pain, or melena Neurologic:  negative for focal weakness, numbness, headache Objective:  
 
Vitals:  
 09/25/18 0145 09/25/18 0540 09/25/18 0714 09/25/18 1002 BP:  117/64  121/61 Pulse:  81  89 Resp:  18  18 Temp:  97.6 °F (36.4 °C)  98.3 °F (36.8 °C) SpO2: 95% 99% 97% 98% Weight:      
Height:      
 
Intake and Output:  
09/23 1901 - 09/25 0700 In: 960 [P.O.:960] Out: 1950 [OGUVM:4855] Physical Exam:  
Constitution:  the patient is well developed and in no acute distress, on 2L O2 
EENMT:  Sclera clear, pupils equal, oral mucosa moist 
Respiratory: coarse wheezing Cardiovascular:  RRR without M,G,R 
Gastrointestinal: soft and non-tender; with positive bowel sounds. Musculoskeletal: warm without cyanosis. There is trace lower leg edema. Skin:  no jaundice or rashes Neurologic: no gross neuro deficits Psychiatric:  alert and oriented x 3 CXR:  
 
 
 
 
-  Left ventricle: Systolic function was normal. Ejection fraction was estimated in the range of 55 % to 60 %. There were no regional wall motion 
abnormalities. LAB No results for input(s): GLUCPOC in the last 72 hours. No lab exists for component: [place] Recent Labs  
   09/25/18 
 6447  09/24/18 
 0353  09/23/18 
 2235 WBC  10.0  11.1   --   
HGB  12.7*  12.0*   --   
HCT  39.3*  37.1*   --   
PLT  214  192   --   
INR  2.5  2.6  2.1 Recent Labs  
   09/25/18 
 2728  09/24/18 
 2055  09/24/18 
 0353  09/23/18 
 2235 NA  143   --   143   --   
K  4.0   --   3.9   --   
CL  113*   --   112*   --   
CO2  19*   --   21   --   
GLU  174*   --   126*   --   
BUN  13   --   16   --   
CREA  1.10   --   1.23   --   
MG  2.2   --   2.2   --   
CA  8.0*   --   7.5*   --   
TROIQ   --   Elevated troponin which has been previously called. 0.41*  0.40* No results for input(s): PH, PCO2, PO2, HCO3 in the last 72 hours. No results for input(s): LCAD, LAC in the last 72 hours. Assessment:  (Medical Decision Making) Hospital Problems  Date Reviewed: 9/25/2018 Codes Class Noted POA Anticoagulated on Coumadin (Chronic) ICD-10-CM: Z51.81, Z79.01 
ICD-9-CM: V58.83, V58.61  9/24/2018 Yes Overview Signed 9/24/2018  3:02 PM by Angelina Ordoñez NP For aortic valve. Continue coumadin Current chronic use of systemic steroids (Chronic) ICD-10-CM: Z79.52 
ICD-9-CM: V58.65  9/24/2018 Yes Overview Signed 9/24/2018  3:03 PM by Angelina Ordoñez NP Prednisone 5 mg daily for COPD Continue steroids Acquired hypothyroidism (Chronic) ICD-10-CM: E03.9 ICD-9-CM: 244.9  9/23/2018 Yes Continue home synthroid COPD (chronic obstructive pulmonary disease) (HCC) (Chronic) ICD-10-CM: J44.9 ICD-9-CM: 876  9/19/2018 Yes Continue nebs S/P AVR (aortic valve replacement) (Chronic) ICD-10-CM: Z95.2 ICD-9-CM: V43.3  5/25/2016 Yes On chronic coumadin Coronary atherosclerosis of native coronary artery (Chronic) ICD-10-CM: I25.10 ICD-9-CM: 414.01  10/22/2015 Yes Chronic, continue plavix Mixed hyperlipidemia (Chronic) ICD-10-CM: R46.3 ICD-9-CM: 272.2  10/22/2015 Yes Chronic Tobacco use disorder (Chronic) ICD-10-CM: Z98.735 ICD-9-CM: 305.1  10/22/2015 Yes Quit a week or two ago Essential hypertension (Chronic) ICD-10-CM: I10 
ICD-9-CM: 401.9  10/22/2015 Yes Controlled * (Principal)Chest pain ICD-10-CM: R07.9 ICD-9-CM: 786.50  10/22/2015 Yes Improved Hypoxia:  Wean O2 as tolerated COPD exacerbation: On steroids, azithro, bronchodilators. Can start weaning to po. On 5mg prednisone chronically. Anticoagulated: monitor INR closely on azithro Plan:  (Medical Decision Making)  
 
--wean O2 as tolerated 
--continue zithromax 
--continue nebs 
--continue solumedrol 
--add guaifenesin --consult PT  
--continue plavix More than 50% of the time documented was spent in face-to-face contact with the patient and in the care of the patient on the floor/unit where the patient is located. ASHANTI Ayala I have spoken with and examined the patient. I agree with the above assessment and plan as documented. Mr. Kayla Willett continues to wheeze but feels he is progressively improving. He is on 1lpm presently. Gen:pleasant, on 1lpm 
Lungs:  Wheeze on L more than R Heart:  RRR with no Murmur/Rubs/Gallops Abd: +Bs Ext: no edema 
 
--continue bronchodilators. Wean steroids --check PA/LAT CXR 
--complete 5 days of azithromycin. Can change to po tomorrow.  
 
Chiquita Mast MD

## 2018-09-25 NOTE — PROGRESS NOTES
Bedside and Verbal shift change report given to self (oncoming nurse) by Roz Machado RN (offgoing nurse). Report included the following information SBAR, Kardex, MAR and Recent Results.

## 2018-09-25 NOTE — PROGRESS NOTES
Bedside and Verbal shift change report given to Ismael Ash RN (oncoming nurse) by self (offgoing nurse). Report included the following information SBAR, Kardex, MAR and Recent Results.

## 2018-09-26 ENCOUNTER — APPOINTMENT (OUTPATIENT)
Dept: GENERAL RADIOLOGY | Age: 73
DRG: 190 | End: 2018-09-26
Attending: INTERNAL MEDICINE
Payer: COMMERCIAL

## 2018-09-26 VITALS
OXYGEN SATURATION: 98 % | HEART RATE: 91 BPM | BODY MASS INDEX: 31.84 KG/M2 | TEMPERATURE: 98.2 F | HEIGHT: 68 IN | SYSTOLIC BLOOD PRESSURE: 122 MMHG | RESPIRATION RATE: 18 BRPM | WEIGHT: 210.1 LBS | DIASTOLIC BLOOD PRESSURE: 73 MMHG

## 2018-09-26 LAB
ANION GAP SERPL CALC-SCNC: 13 MMOL/L (ref 7–16)
BUN SERPL-MCNC: 17 MG/DL (ref 8–23)
CALCIUM SERPL-MCNC: 8.2 MG/DL (ref 8.3–10.4)
CHLORIDE SERPL-SCNC: 112 MMOL/L (ref 98–107)
CO2 SERPL-SCNC: 17 MMOL/L (ref 21–32)
CREAT SERPL-MCNC: 1.16 MG/DL (ref 0.8–1.5)
ERYTHROCYTE [DISTWIDTH] IN BLOOD BY AUTOMATED COUNT: 14.2 %
GLUCOSE SERPL-MCNC: 155 MG/DL (ref 65–100)
HCT VFR BLD AUTO: 41.4 % (ref 41.1–50.3)
HGB BLD-MCNC: 13.5 G/DL (ref 13.6–17.2)
INR PPP: 2.1
MAGNESIUM SERPL-MCNC: 2.2 MG/DL (ref 1.8–2.4)
MCH RBC QN AUTO: 31.6 PG (ref 26.1–32.9)
MCHC RBC AUTO-ENTMCNC: 32.6 G/DL (ref 31.4–35)
MCV RBC AUTO: 97 FL (ref 79.6–97.8)
NRBC # BLD: 0 K/UL (ref 0–0.2)
PLATELET # BLD AUTO: 243 K/UL (ref 150–450)
PMV BLD AUTO: 10.3 FL (ref 9.4–12.3)
POTASSIUM SERPL-SCNC: 3.9 MMOL/L (ref 3.5–5.1)
PROTHROMBIN TIME: 22.4 SEC (ref 11.5–14.5)
RBC # BLD AUTO: 4.27 M/UL (ref 4.23–5.6)
SODIUM SERPL-SCNC: 142 MMOL/L (ref 136–145)
WBC # BLD AUTO: 22.5 K/UL (ref 4.3–11.1)

## 2018-09-26 PROCEDURE — 90686 IIV4 VACC NO PRSV 0.5 ML IM: CPT | Performed by: NURSE PRACTITIONER

## 2018-09-26 PROCEDURE — 94640 AIRWAY INHALATION TREATMENT: CPT

## 2018-09-26 PROCEDURE — 97110 THERAPEUTIC EXERCISES: CPT

## 2018-09-26 PROCEDURE — 97161 PT EVAL LOW COMPLEX 20 MIN: CPT

## 2018-09-26 PROCEDURE — 85027 COMPLETE CBC AUTOMATED: CPT

## 2018-09-26 PROCEDURE — 74011636637 HC RX REV CODE- 636/637: Performed by: INTERNAL MEDICINE

## 2018-09-26 PROCEDURE — 74011250637 HC RX REV CODE- 250/637: Performed by: INTERNAL MEDICINE

## 2018-09-26 PROCEDURE — 85610 PROTHROMBIN TIME: CPT

## 2018-09-26 PROCEDURE — 90471 IMMUNIZATION ADMIN: CPT

## 2018-09-26 PROCEDURE — 77010033678 HC OXYGEN DAILY

## 2018-09-26 PROCEDURE — 74011250636 HC RX REV CODE- 250/636: Performed by: NURSE PRACTITIONER

## 2018-09-26 PROCEDURE — 74011250637 HC RX REV CODE- 250/637: Performed by: NURSE PRACTITIONER

## 2018-09-26 PROCEDURE — 80048 BASIC METABOLIC PNL TOTAL CA: CPT

## 2018-09-26 PROCEDURE — 36415 COLL VENOUS BLD VENIPUNCTURE: CPT

## 2018-09-26 PROCEDURE — 74011000250 HC RX REV CODE- 250: Performed by: NURSE PRACTITIONER

## 2018-09-26 PROCEDURE — 94760 N-INVAS EAR/PLS OXIMETRY 1: CPT

## 2018-09-26 PROCEDURE — 83735 ASSAY OF MAGNESIUM: CPT

## 2018-09-26 PROCEDURE — 71046 X-RAY EXAM CHEST 2 VIEWS: CPT

## 2018-09-26 PROCEDURE — 99232 SBSQ HOSP IP/OBS MODERATE 35: CPT | Performed by: INTERNAL MEDICINE

## 2018-09-26 RX ORDER — WARFARIN 1 MG/1
2 TABLET ORAL
Status: DISCONTINUED | OUTPATIENT
Start: 2018-09-28 | End: 2018-09-26 | Stop reason: HOSPADM

## 2018-09-26 RX ORDER — WARFARIN 1 MG/1
1 TABLET ORAL EVERY EVENING
Status: DISCONTINUED | OUTPATIENT
Start: 2018-09-26 | End: 2018-09-26

## 2018-09-26 RX ORDER — PREDNISONE 20 MG/1
TABLET ORAL
Qty: 12 TAB | Refills: 0 | Status: SHIPPED | OUTPATIENT
Start: 2018-09-26 | End: 2018-10-22

## 2018-09-26 RX ORDER — WARFARIN 1 MG/1
1 TABLET ORAL
Status: COMPLETED | OUTPATIENT
Start: 2018-09-26 | End: 2018-09-26

## 2018-09-26 RX ORDER — WARFARIN 1 MG/1
1 TABLET ORAL ONCE
Status: DISCONTINUED | OUTPATIENT
Start: 2018-09-26 | End: 2018-09-26 | Stop reason: HOSPADM

## 2018-09-26 RX ORDER — WARFARIN 1 MG/1
1 TABLET ORAL
Status: DISCONTINUED | OUTPATIENT
Start: 2018-09-27 | End: 2018-09-26 | Stop reason: HOSPADM

## 2018-09-26 RX ORDER — PREDNISONE 20 MG/1
TABLET ORAL
Qty: 12 TAB | Refills: 0 | Status: SHIPPED | OUTPATIENT
Start: 2018-09-26 | End: 2018-09-26

## 2018-09-26 RX ADMIN — ALBUTEROL SULFATE 2.5 MG: 2.5 SOLUTION RESPIRATORY (INHALATION) at 14:08

## 2018-09-26 RX ADMIN — TAMSULOSIN HYDROCHLORIDE 0.4 MG: 0.4 CAPSULE ORAL at 08:24

## 2018-09-26 RX ADMIN — Medication 10 ML: at 05:47

## 2018-09-26 RX ADMIN — ATORVASTATIN CALCIUM 40 MG: 40 TABLET, FILM COATED ORAL at 08:24

## 2018-09-26 RX ADMIN — AZITHROMYCIN 500 MG: 250 TABLET, FILM COATED ORAL at 16:15

## 2018-09-26 RX ADMIN — TOPIRAMATE 100 MG: 100 TABLET, FILM COATED ORAL at 08:24

## 2018-09-26 RX ADMIN — ALBUTEROL SULFATE 2.5 MG: 2.5 SOLUTION RESPIRATORY (INHALATION) at 07:20

## 2018-09-26 RX ADMIN — BUDESONIDE 500 MCG: 0.5 INHALANT RESPIRATORY (INHALATION) at 07:20

## 2018-09-26 RX ADMIN — INFLUENZA VIRUS VACCINE 0.5 ML: 15; 15; 15; 15 SUSPENSION INTRAMUSCULAR at 17:25

## 2018-09-26 RX ADMIN — ALBUTEROL SULFATE 2.5 MG: 2.5 SOLUTION RESPIRATORY (INHALATION) at 01:40

## 2018-09-26 RX ADMIN — TIOTROPIUM BROMIDE 18 MCG: 18 CAPSULE ORAL; RESPIRATORY (INHALATION) at 07:20

## 2018-09-26 RX ADMIN — PREDNISONE 40 MG: 20 TABLET ORAL at 08:24

## 2018-09-26 RX ADMIN — HYDROCODONE BITARTRATE AND ACETAMINOPHEN 1 TABLET: 7.5; 325 TABLET ORAL at 11:34

## 2018-09-26 RX ADMIN — LEVOTHYROXINE SODIUM 25 MCG: 50 TABLET ORAL at 07:47

## 2018-09-26 RX ADMIN — PANTOPRAZOLE SODIUM 40 MG: 40 TABLET, DELAYED RELEASE ORAL at 08:24

## 2018-09-26 RX ADMIN — WARFARIN SODIUM 1 MG: 1 TABLET ORAL at 09:32

## 2018-09-26 RX ADMIN — CLOPIDOGREL BISULFATE 75 MG: 75 TABLET ORAL at 08:24

## 2018-09-26 RX ADMIN — ASPIRIN 81 MG: 81 TABLET, COATED ORAL at 08:24

## 2018-09-26 NOTE — DISCHARGE SUMMARY
[facility] Cardiology Discharge Summary     Patient ID:  Dimitri Montes De Oca  [id number]  89 y.o.  1945    Admit date: 9/23/2018    Discharge date and time:  9-    Admitting Physician: Tiara Taylor MD     Discharge Physician: Sherly Small PA-C/Dr. [facility]    Admission Diagnoses: chest pain    Discharge Diagnoses:   Patient Active Problem List    Diagnosis Date Noted    Anticoagulated on Coumadin 09/24/2018    Current chronic use of systemic steroids 09/24/2018    Acquired hypothyroidism 09/23/2018    S/P CABG x 2 09/19/2018    NSTEMI (non-ST elevated myocardial infarction) ([facility] [address] 09/19/2018    COPD (chronic obstructive pulmonary disease) ([facility] [address] 09/19/2018    Long term (current) use of anticoagulants 05/01/2018    S/P AVR (aortic valve replacement) 05/25/2016    Aortic stenosis 10/22/2015    Coronary atherosclerosis of native coronary artery 10/22/2015    Mixed hyperlipidemia 10/22/2015    Tobacco use disorder 10/22/2015    Essential hypertension 10/22/2015    Chest pain 10/22/2015     Cardiology Procedures this admission:  EchoCardiogram  Consults: Pulmonary/Intensive care    Hospital Course: Pt is a 68 y.o. WM with h/o CAD s/p CABG (1996 with SVG-LAD and dRCA), AS s/p [facility] [employer] mechanical AVR, tobacco abuse, COPD, HTN, dyslipidemia, CVA 2004 and hypothyroidism who presented to [facility] with c/o CP with radiation to left arm. He was discharged from [facility] on 9/21 after admission for NSTEMI and LHC with multivessel PCI. He states he felt well at discharge, however, the following morning he began to have recurrent CP and SOB. He took multiple SL NTG throughout the day with reduction of symptoms temporarily. He reported SOB at baseline but SOB became worse over 24 hours with wheezing. He presented to [facility] ER. He was found to have low BP and he was given 1.5 L NS bolus with some improvement. His troponin was elevated however it was thought to be secondary to recent NSTEMI.   Due to CP, SOB and recent PCI he was transferred to [facility] for cardiac evaluation. He was admitted and pulmonary was consulted for COPD exacerbation. He was treated with IV steroids, antibiotics and inhalers/nebulizers. He had no further CP after COPD treatment. BP improved off BB/ACE-I. ACE-I was resumed per Dr. Juan José Maria. Will remain off BB due to COPD with wheezing. Pt was up feeling well without any complaints of CP, SOB or palpitations. Pt's labs were WNL. Pt was seen and examined by Dr. Don Garza and determined stable and ready for discharge. Pulmonary wrote Rx for tapering Prednisone dose and antibiotics were completed per pharmacy. DISPOSITION: The patient is being discharged home in stable condition on a low saturated fat, low cholesterol and low salt diet. Pt is instructed to advance activities as tolerated limited to fatigue or shortness of breath. Pt is instructed to call office or return to ER for immediate evaluation of any shortness of breath or chest pain. Follow up with [facility] Dr. Rebekah Hall on Wed 10/3 at 9:15 AM  Follow up with PCP Dr. Adrianna Phillips in 1-2 weeks, pulmonary in 2 weeks    Discharge Exam:   Visit Vitals    /74    Pulse 78    Temp 97.6 °F (36.4 °C)    Resp 20    Ht 5' 8\" (1.727 m)    Wt 95.3 kg (210 lb 1.6 oz)    SpO2 98%    BMI 31.95 kg/m2   Pt has been seen by Dr. Flo Womack: see his progress note for exam details.     Recent Results (from the past 24 hour(s))   METABOLIC PANEL, BASIC    Collection Time: 09/26/18  3:45 AM   Result Value Ref Range    Sodium 142 136 - 145 mmol/L    Potassium 3.9 3.5 - 5.1 mmol/L    Chloride 112 (H) 98 - 107 mmol/L    CO2 17 (L) 21 - 32 mmol/L    Anion gap 13 7 - 16 mmol/L    Glucose 155 (H) 65 - 100 mg/dL    BUN 17 8 - 23 MG/DL    Creatinine 1.16 0.8 - 1.5 MG/DL    GFR est AA >60 >60 ml/min/1.73m2    GFR est non-AA >60 >60 ml/min/1.73m2    Calcium 8.2 (L) 8.3 - 10.4 MG/DL   CBC W/O DIFF    Collection Time: 09/26/18  3:45 AM   Result Value Ref Range    WBC 22.5 (H) 4.3 - 11.1 K/uL    RBC 4.27 4.23 - 5.6 M/uL    HGB 13.5 (L) 13.6 - 17.2 g/dL    HCT 41.4 41.1 - 50.3 %    MCV 97.0 79.6 - 97.8 FL    MCH 31.6 26.1 - 32.9 PG    MCHC 32.6 31.4 - 35.0 g/dL    RDW 14.2 %    PLATELET 993 123 - 544 K/uL    MPV 10.3 9.4 - 12.3 FL    ABSOLUTE NRBC 0.00 0.0 - 0.2 K/uL   MAGNESIUM    Collection Time: 09/26/18  3:45 AM   Result Value Ref Range    Magnesium 2.2 1.8 - 2.4 mg/dL   PROTHROMBIN TIME + INR    Collection Time: 09/26/18  3:45 AM   Result Value Ref Range    Prothrombin time 22.4 (H) 11.5 - 14.5 sec    INR 2.1           Patient Instructions:   Current Discharge Medication List      CONTINUE these medications which have CHANGED    Details   predniSONE (DELTASONE) 20 mg tablet Take 2 pill for 3 days  Then 1 pill for 3 days   Then 1/2 pill for 3 days  Then stop  Qty: 12 Tab, Refills: 0         CONTINUE these medications which have NOT CHANGED    Details   lisinopril (PRINIVIL, ZESTRIL) 2.5 mg tablet Take 1 Tab by mouth daily. Qty: 30 Tab, Refills: 11      clopidogrel (PLAVIX) 75 mg tab Take 1 Tab by mouth daily. Qty: 30 Tab, Refills: 11      nitroglycerin (NITROSTAT) 0.4 mg SL tablet 1 Tab by SubLINGual route every five (5) minutes as needed for Chest Pain. Up to 3 doses. Qty: 1 Bottle, Refills: 5      fish oil-omega-3 fatty acids 340-1,000 mg capsule Take 1 Cap by mouth daily. warfarin (COUMADIN) 1 mg tablet Take 1 mg by mouth daily. Brand name      potassium 99 mg tablet Take 99 mg by mouth daily. MAGNESIUM PO Take  by mouth. tiotropium (SPIRIVA) 18 mcg inhalation capsule Take 1 Cap by inhalation daily. tamsulosin (FLOMAX) 0.4 mg capsule Take 0.4 mg by mouth daily. topiramate (TOPAMAX) 100 mg tablet Take 100 mg by mouth daily. venlafaxine (EFFEXOR) 75 mg tablet Take 25 mg by mouth nightly. aspirin delayed-release 81 mg tablet Take  by mouth daily. atorvastatin (LIPITOR) 40 mg tablet Take  by mouth daily.       Dexlansoprazole 60 mg CpDB Take  by mouth.      HYDROcodone-acetaminophen (NORCO) 7.5-325 mg per tablet Take  by mouth.      levothyroxine (SYNTHROID) 25 mcg tablet Take  by mouth Daily (before breakfast).          STOP taking these medications       metoprolol succinate (TOPROL-XL) 25 mg XL tablet Comments:   Reason for Stopping:         VIT A/C/E AC/ZNOX/CUPRIC OXIDE (EYE VITAMIN AND MINERALS PO) Comments:   Reason for Stopping:         isosorbide mononitrate ER (IMDUR) 30 mg tablet Comments:   Reason for Stopping:               Yoly Patel PA-C  9/26/2018  9:07 Siri Andrews MD

## 2018-09-26 NOTE — PROGRESS NOTES
Bedside and Verbal shift change report given to William Rock RN (oncoming nurse) by self (offgoing nurse). Report included the following information SBAR, Kardex, MAR and Recent Results.

## 2018-09-26 NOTE — PROGRESS NOTES
Problem: Mobility Impaired (Adult and Pediatric) Goal: *Acute Goals and Plan of Care (Insert Text) LTG: 
(1.)Mr. Beatriz Batista will ambulate with INDEPENDENCE for 250+ feet with appropriate VS response with no device within 7 day(s). (2.)Mr. Beatriz Batista will perform standing static and dynamic balance activities x 8 minutes with SUPERVISION to improve safety within 7 day(s). (3.)Mr. Beatriz Batista will ascend and descend 4 stairs using one hand rail(s) with modified independence to improve functional mobility and safety within 7 day(s). 4.  Patient will utilize energy conservation techniques during functional activities with verbal cues within 7 day(s). PHYSICAL THERAPY: Initial Assessment, Treatment Day: Day of Assessment, AM 9/26/2018 INPATIENT: Hospital Day: 4 Payor: [employer] VIP C[employer] PLUS / [place] [employer] VIP CARE PLUS / Product Type: Managed Care Medicare /  
  
NAME/AGE/GENDER: Saulo Smith is a 68 y.o. male PRIMARY DIAGNOSIS: chest pain Chest pain Chest pain Chest pain ICD-10: Treatment Diagnosis:  
 · Other abnormalities of gait and mobility (R26.89) Precaution/Allergies: 
Advair diskus [fluticasone-salmeterol] ASSESSMENT:  
 
Mr. Beatriz Batista presents supine in bed on 2L O2 via nasal cannula and agreeable for PT assessment. Patient transitioned to sit with modified independence. He stood independently and ambulated 175' with 2 standing rest breaks on room air. SpO2 ranged from 90-97% with cueing for pursed lip breathing and HR increased from 90's to 100's. Patient suffered an MI last week and has declined in functional mobility. Mr. Beatriz Batista would benefit from skilled physical therapy (medically necessary) to address his deficits and maximize his function. Initiated treatment to include standing balance exercises. Educated patient in signs of MI and energy conservation technique. Patient with good participation and effort. This section established at most recent assessment PROBLEM LIST (Impairments causing functional limitations): 1. Decreased ADL/Functional Activities 2. Decreased Transfer Abilities 3. Decreased Ambulation Ability/Technique 4. Decreased Balance 5. Increased Pain 6. Decreased Activity Tolerance 7. Decreased Work Simplification/Energy Conservation Techniques 8. Increased Shortness of Breath INTERVENTIONS PLANNED: (Benefits and precautions of physical therapy have been discussed with the patient.) 1. Balance Exercise 2. Bed Mobility 3. Gait Training 4. Therapeutic Activites 5. Therapeutic Exercise/Strengthening 6. Transfer Training 7. education 8. Group Therapy TREATMENT PLAN: Frequency/Duration: 3 times a week for duration of hospital stay Rehabilitation Potential For Stated Goals: Good RECOMMENDED REHABILITATION/EQUIPMENT: (at time of discharge pending progress): Due to the probability of continued deficits (see above) this patient will likely need continued skilled physical therapy after discharge- or just cardiac rehab. Equipment:  
? None at this time HISTORY:  
History of Present Injury/Illness (Reason for Referral): 
Per MD note, \"Patient seen and examined by me. Agree with above note by physician extender. Key findings are:  Patient admitted with CP and dyspnea. Troponins are trending down from NSTEMI last week. He continues with wheezing, cough and congestion with on/off CP. CV- RRR without MRG Lungs- Diminished and wheezing bilaterally Abdomen- soft, non-tender, normal bowel sounds Extremities- no edema\" Past Medical History/Comorbidities:  
Mr. Joel Lock  has a past medical history of Aortic stenosis (10/22/2015); Chest pain (10/22/2015); COPD (chronic obstructive pulmonary disease) ([facility] [address]  Coronary artery disease; Coronary atherosclerosis of native coronary artery (10/22/2015); CVA (cerebrovascular accident) ([facility] [address] (2004); Essential hypertension (10/22/2015); Mixed hyperlipidemia (10/22/2015); and Tobacco use disorder (10/22/2015). Mr. Ching Dodge  has a past surgical history that includes hx coronary artery bypass graft (). Social History/Living Environment:  
Home Environment: Private residence # Steps to Enter: 3 One/Two Story Residence: One story Living Alone: No 
Support Systems: Spouse/Significant Other/Partner Patient Expects to be Discharged to[de-identified] Private residence Current DME Used/Available at Home: None Prior Level of Function/Work/Activity: 
Lives at home with GF, ambulates independently in home and community. Number of Personal Factors/Comorbidities that affect the Plan of Care: 1-2: MODERATE COMPLEXITY EXAMINATION:  
Most Recent Physical Functioning:  
Gross Assessment: 
AROM: Generally decreased, functional 
Strength: Generally decreased, functional 
         
  
Posture: 
Posture (WDL): Exceptions to [facility] Posture Assessment: Forward head, Rounded shoulders Balance: 
Sitting: Intact Standing: Intact Bed Mobility: 
Supine to Sit: Modified independent Scooting: Independent Wheelchair Mobility: 
  
Transfers: 
Sit to Stand: Independent Stand to Sit: Independent Bed to Chair: Modified independent Gait: 
  
Speed/Luciana: Slow Step Length: Right shortened;Left shortened Distance (ft): 175 Feet (ft) (with 2 standing rest breaks) Ambulation - Level of Assistance: Stand-by assistance Interventions: Verbal cues Body Structures Involved: 1. Heart 2. Lungs Body Functions Affected: 1. Cardio 2. Respiratory Activities and Participation Affected: 1. Mobility 2. Community, Social and [place] Number of elements that affect the Plan of Care: 3: MODERATE COMPLEXITY CLINICAL PRESENTATION:  
Presentation: Stable and uncomplicated: LOW COMPLEXITY CLINICAL DECISION [facility] [place] [address] [address] AM-PAC 6 Clicks Basic Mobility Inpatient Short Form How much difficulty does the patient currently have. .. Unable A Lot A Little None 1. Turning over in bed (including adjusting bedclothes, sheets and blankets)? [] 1   [] 2   [] 3   [x] 4  
2. Sitting down on and standing up from a chair with arms ( e.g., wheelchair, bedside commode, etc.)   [] 1   [] 2   [] 3   [x] 4  
3. Moving from lying on back to sitting on the side of the bed? [] 1   [] 2   [] 3   [x] 4 How much help from another person does the patient currently need. .. Total A Lot A Little None 4. Moving to and from a bed to a chair (including a wheelchair)? [] 1   [] 2   [] 3   [x] 4  
5. Need to walk in hospital room? [] 1   [] 2   [x] 3   [] 4  
6. Climbing 3-5 steps with a railing? [] 1   [] 2   [x] 3   [] 4  
© 2007, Trustees of [address] [place] [address], under license to [employer]. All rights reserved Score:  Initial: 22 Most Recent: X (Date: -- ) Interpretation of Tool:  Represents activities that are increasingly more difficult (i.e. Bed mobility, Transfers, Gait). Score 24 23 22-20 19-15 14-10 9-7 6 Modifier CH CI CJ CK CL CM CN   
 
? Mobility - Walking and Moving Around:  
  - CURRENT STATUS: CJ - 20%-39% impaired, limited or restricted  - GOAL STATUS: CI - 1%-19% impaired, limited or restricted  - D/C STATUS:  ---------------To be determined--------------- Payor: [employer] PLUS / [place] [employer]US / Product Type: Managed Care Medicare /   
 
Medical Necessity:    
· Patient is expected to demonstrate progress in strength, range of motion, balance, coordination and functional technique to increase independence with   and improve safety during all functional mobility. Reason for Services/Other Comments: 
· Patient continues to require skilled intervention due to medical complications and decline in functional mobility.   
Use of outcome tool(s) and clinical judgement create a POC that gives a: Clear prediction of patient's progress: LOW COMPLEXITY  
  
 
 
 
TREATMENT:  
(In addition to Assessment/Re-Assessment sessions the following treatments were rendered) Pre-treatment Symptoms/Complaints:   
Pain: Initial:  
Pain Intensity 1: 7 Pain Location 1: Hip  Post Session:  Unchanged. Therapeutic Exercise: (  10 minutes):  Exercises per grid below to improve mobility, strength and balance. Required minimal visual and verbal cues to promote proper body alignment and promote proper body breathing techniques. Progressed complexity of movement as indicated. Hands on table top for balance. Educated patient in signs of MI and also energy conservation techniques. Date: 9/26/18 Ambulation Device 
assistance Partial Squats Hip Abduction/ Adduction Standing x12 AB Heel Raises Standing x12 AB Toe Raises Standing x12 AB Hip Flexion Standing x15 AB Sit to Stand Key:  R=right, L=left, B=bilaterally, A=active, AA=active assisted, P=passive Braces/Orthotics/Lines/Etc:  
· O2 Device: Nasal cannula Treatment/Session Assessment:   
· Response to Treatment:  Pleasant and cooperative. · Interdisciplinary Collaboration:  
o Physical Therapist 
o Registered Nurse · After treatment position/precautions:  
o Up in chair 
o Bed/Chair-wheels locked 
o Call light within reach · Compliance with Program/Exercises: compliant all of the time. · Recommendations/Intent for next treatment session: \"Next visit will focus on advancements to more challenging activities and reduction in assistance provided\". Total Treatment Duration: PT Patient Time In/Time Out Time In: 1140 Time Out: 3550 Munira Simms PT, DPT

## 2018-09-26 NOTE — PROGRESS NOTES
Care Management Interventions PCP Verified by CM: Yes 
Palliative Care Criteria Met (RRAT>21 & CHF Dx)?: No (RRAT 22 Dx Chest pain) Transition of Care Consult (CM Consult): Discharge Planning Discharge Durable Medical Equipment: Yes (O2 with [employer] 2 liters NC with ambulation) Physical Therapy Consult: No 
Occupational Therapy Consult: No 
Speech Therapy Consult: No 
Current Support Network: Lives with Spouse Confirm Follow Up Transport: Family Plan discussed with Pt/Family/Caregiver: Yes Freedom of Choice Offered: Yes Discharge Location Discharge Placement: Home Follow up with patient for d/c plans. Patient states hopes to go home today. O2 sat's completed by RT and will need O2 with exertion. [facility]erral sent to [facility] [address] [address] for O2 set up as they take patient's insurance. Patient declines home health. Patient to d/c home with family and new set up for O2. Addendum: Patient given portable O2 tank from [address] and number to call at discharge for concentrator delivery at home of [phone]. Patient voices understanding of instructions.

## 2018-09-26 NOTE — PROGRESS NOTES
Problem: Falls - Risk of 
Goal: *Absence of Falls Document [facility] Fall Risk and appropriate interventions in the flowsheet. Outcome: Progressing Towards Goal 
Fall Risk Interventions: 
Mobility Interventions: Bed/chair exit alarm, Patient to call before getting OOB Medication Interventions: Bed/chair exit alarm, Patient to call before getting OOB Elimination Interventions: Call light in reach

## 2018-09-26 NOTE — DISCHARGE INSTRUCTIONS
Chronic Obstructive Pulmonary Disease (COPD): Care Instructions  Your Care Instructions    Chronic obstructive pulmonary disease (COPD) is a general term for a group of lung diseases, including emphysema and chronic bronchitis. People with COPD have decreased airflow in and out of the lungs, which makes it hard to breathe. The airways also can get clogged with thick mucus. Cigarette smoking is a major cause of COPD. Although there is no cure for COPD, you can slow its progress. Following your treatment plan and taking care of yourself can help you feel better and live longer. Follow-up care is a key part of your treatment and safety. Be sure to make and go to all appointments, and call your doctor if you are having problems. It's also a good idea to know your test results and keep a list of the medicines you take. How can you care for yourself at home?   Staying healthy    · Do not smoke. This is the most important step you can take to prevent more damage to your lungs. If you need help quitting, talk to your doctor about stop-smoking programs and medicines. These can increase your chances of quitting for good.     · Avoid colds and flu. Get a pneumococcal vaccine shot. If you have had one before, ask your doctor whether you need a second dose. Get the flu vaccine every fall. If you must be around people with colds or the flu, wash your hands often.     · Avoid secondhand smoke, air pollution, and high altitudes. Also avoid cold, dry air and hot, humid air. Stay at home with your windows closed when air pollution is bad.    Medicines and oxygen therapy    · Take your medicines exactly as prescribed. Call your doctor if you think you are having a problem with your medicine.     · You may be taking medicines such as:  ¨ Bronchodilators. These help open your airways and make breathing easier. Bronchodilators are either short-acting (work for 6 to 9 hours) or long-acting (work for 24 hours).  You inhale most bronchodilators, so they start to act quickly. Always carry your quick-relief inhaler with you in case you need it while you are away from home. ¨ Corticosteroids (prednisone, budesonide). These reduce airway inflammation. They come in pill or inhaled form. You must take these medicines every day for them to work well.     · A spacer may help you get more inhaled medicine to your lungs. Ask your doctor or pharmacist if a spacer is right for you. If it is, ask how to use it properly.     · Do not take any vitamins, over-the-counter medicine, or herbal products without talking to your doctor first.     · If your doctor prescribed antibiotics, take them as directed. Do not stop taking them just because you feel better. You need to take the full course of antibiotics.     · Oxygen therapy boosts the amount of oxygen in your blood and helps you breathe easier. Use the flow rate your doctor has recommended, and do not change it without talking to your doctor first.   Activity    · Get regular exercise. Walking is an easy way to get exercise. Start out slowly, and walk a little more each day.     · Pay attention to your breathing. You are exercising too hard if you cannot talk while you are exercising.     · Take short rest breaks when doing household chores and other activities.     · Learn breathing methods-such as breathing through pursed lips-to help you become less short of breath.     · If your doctor has not set you up with a pulmonary rehabilitation program, talk to him or her about whether rehab is right for you. Rehab includes exercise programs, education about your disease and how to manage it, help with diet and other changes, and emotional support. Diet    · Eat regular, healthy meals. Use bronchodilators about 1 hour before you eat to make it easier to eat. Eat several small meals instead of three large ones.  Drink beverages at the end of the meal. Avoid foods that are hard to chew.     · Eat foods that contain protein so that you do not lose muscle mass.     · Talk with your doctor if you gain too much weight or if you lose weight without trying.    Mental health    · Talk to your family, friends, or a therapist about your feelings. It is normal to feel frightened, angry, hopeless, helpless, and even guilty. Talking openly about bad feelings can help you cope. If these feelings last, talk to your doctor. When should you call for help? Call 911 anytime you think you may need emergency care. For example, call if:    · You have severe trouble breathing.    Call your doctor now or seek immediate medical care if:    · You have new or worse trouble breathing.     · You cough up blood.     · You have a fever.    Watch closely for changes in your health, and be sure to contact your doctor if:    · You cough more deeply or more often, especially if you notice more mucus or a change in the color of your mucus.     · You have new or worse swelling in your legs or belly.     · You are not getting better as expected. Where can you learn more? Go to http://sukhdev-vahe.info/. Jamari Jones in the search box to learn more about \"Chronic Obstructive Pulmonary Disease (COPD): Care Instructions. \"  Current as of: December 6, 2017  Content Version: 11.7  © 5341-7130 [employer]. Care instructions adapted under license by [employer] (which disclaims liability or warranty for this information). If you have questions about a medical condition or this instruction, always ask your healthcare professional. [place] [address] any warranty or liability for your use of this information.       DISCHARGE SUMMARY from Nurse    PATIENT INSTRUCTIONS:    After general anesthesia or intravenous sedation, for 24 hours or while taking prescription Narcotics:  · Limit your activities  · Do not drive and operate hazardous machinery  · Do not make important personal or business decisions  · Do  not drink alcoholic beverages  · If you have not urinated within 8 hours after discharge, please contact your surgeon on call. Report the following to your surgeon:  · Excessive pain, swelling, redness or odor of or around the surgical area  · Temperature over 100.5  · Nausea and vomiting lasting longer than 4 hours or if unable to take medications  · Any signs of decreased circulation or nerve impairment to extremity: change in color, persistent  numbness, tingling, coldness or increase pain  · Any questions    What to do at Home:  Recommended activity: Activity as tolerated      *  Please give a list of your current medications to your Primary Care Provider. *  Please update this list whenever your medications are discontinued, doses are      changed, or new medications (including over-the-counter products) are added. *  Please carry medication information at all times in case of emergency situations. These are general instructions for a healthy lifestyle:    No smoking/ No tobacco products/ Avoid exposure to second hand smoke  Surgeon General's Warning:  Quitting smoking now greatly reduces serious risk to your health. Obesity, smoking, and sedentary lifestyle greatly increases your risk for illness    A healthy diet, regular physical exercise & weight monitoring are important for maintaining a healthy lifestyle    You may be retaining fluid if you have a history of heart failure or if you experience any of the following symptoms:  Weight gain of 3 pounds or more overnight or 5 pounds in a week, increased swelling in our hands or feet or shortness of breath while lying flat in bed. Please call your doctor as soon as you notice any of these symptoms; do not wait until your next office visit.     Recognize signs and symptoms of STROKE:    F-face looks uneven    A-arms unable to move or move unevenly    S-speech slurred or non-existent    T-time-call 911 as soon as signs and symptoms begin-DO NOT go       Back to bed or wait to see if you get better-TIME IS BRAIN. Warning Signs of HEART ATTACK     Call 911 if you have these symptoms:   Chest discomfort. Most heart attacks involve discomfort in the center of the chest that lasts more than a few minutes, or that goes away and comes back. It can feel like uncomfortable pressure, squeezing, fullness, or pain.  Discomfort in other areas of the upper body. Symptoms can include pain or discomfort in one or both arms, the back, neck, jaw, or stomach.  Shortness of breath with or without chest discomfort.  Other signs may include breaking out in a cold sweat, nausea, or lightheadedness. Don't wait more than five minutes to call 911 - MINUTES MATTER! Fast action can save your life. Calling 911 is almost always the fastest way to get lifesaving treatment. Emergency Medical Services staff can begin treatment when they arrive -- up to an hour sooner than if someone gets to the hospital by car. The discharge information has been reviewed with the patient. The patient verbalized understanding. Discharge medications reviewed with the patient and appropriate educational materials and side effects teaching were provided.   ___________________________________________________________________________________________________________________________________

## 2018-09-26 NOTE — PROGRESS NOTES
Discharge instructions reviewed with patient. Prescriptions given for prednisone and med info sheets provided for all new medications. Opportunity for questions provided. Patient voiced understanding of all discharge instructions.

## 2018-09-26 NOTE — PROGRESS NOTES
Oxygen Qualifier Room air: SpO2 with O2 and liter flow Resting SpO2  92%  98% on 2L Ambulating SpO2  85% 95% on 2L Completed by: 
 
Ellen Baez, RT

## 2018-09-26 NOTE — PROGRESS NOTES
Bedside and Verbal shift change report given to self (oncoming nurse) by Wilbur Flanagan RN (offgoing nurse). Report included the following information SBAR, Kardex, Procedure Summary, Intake/Output, MAR, Recent Results and Cardiac Rhythm NSR.

## 2018-09-26 NOTE — PROGRESS NOTES
Reji Case Admission Date: 9/23/2018 Daily Progress Note: 9/26/2018 The patient's chart is reviewed and the patient is discussed with the staff. 69 yo male with with COPD on chronic prednisone, tobacco abuse, CAD s/p CABG, s/p mechanical AVR on chronic coumadin, and prior CVA. Patient was recently hospitalized for NSTEMI with multivessel PCIs. Pt was discharged 9/21/2018. Patient presented to [facility] with chest pain and was transferred to [facility]. We were consulted to assist with COPD exacerbation management. He is seen by Dr. Marley Nelson for JON but is noncompliant. Subjective:  
 
Patient seen resting quietly in bed. States feels better than when he was admitted but \"about the same\" as yesterday. Currently on 2L O2 NC. Current Facility-Administered Medications Medication Dose Route Frequency  [START ON 9/27/2018] warfarin (COUMADIN) tablet 1 mg  1 mg Oral Once per day on Tue Thu Sat  [START ON 9/28/2018] warfarin (COUMADIN) tablet 2 mg  2 mg Oral Once per day on Sun Mon Wed Fri  warfarin (COUMADIN) tablet 1 mg  1 mg Oral ONCE  
 guaiFENesin (ROBITUSSIN) 100 mg/5 mL oral liquid 100 mg  100 mg Oral Q4H PRN  predniSONE (DELTASONE) tablet 40 mg  40 mg Oral DAILY WITH BREAKFAST  azithromycin (ZITHROMAX) tablet 500 mg  500 mg Oral DAILY  influenza vaccine 2018-19 (6 mos+)(PF) (FLUARIX QUAD/FLULAVAL QUAD) injection 0.5 mL  0.5 mL IntraMUSCular PRIOR TO DISCHARGE  aspirin delayed-release tablet 81 mg  81 mg Oral DAILY  atorvastatin (LIPITOR) tablet 40 mg  40 mg Oral DAILY  clopidogrel (PLAVIX) tablet 75 mg  75 mg Oral DAILY  pantoprazole (PROTONIX) tablet 40 mg  40 mg Oral DAILY  HYDROcodone-acetaminophen (NORCO) 7.5-325 mg per tablet 1 Tab  1 Tab Oral Q4H PRN  
 levothyroxine (SYNTHROID) tablet 25 mcg  25 mcg Oral ACB  nitroglycerin (NITROSTAT) tablet 0.4 mg  0.4 mg SubLINGual Q5MIN PRN  
  tamsulosin (FLOMAX) capsule 0.4 mg  0.4 mg Oral DAILY  tiotropium (SPIRIVA) inhalation capsule 18 mcg  1 Cap Inhalation DAILY  topiramate (TOPAMAX) tablet 100 mg  100 mg Oral DAILY  venlafaxine (EFFEXOR) tablet 25 mg  25 mg Oral QHS  acetaminophen (TYLENOL) tablet 650 mg  650 mg Oral Q4H PRN  
 alum-mag hydroxide-simeth (MYLANTA) oral suspension 30 mL  30 mL Oral QID PRN  
 diphenhydrAMINE (BENADRYL) capsule 25 mg  25 mg Oral Q6H PRN  
 LORazepam (ATIVAN) tablet 1 mg  1 mg Oral Q4H PRN  
 morphine injection 2 mg  2 mg IntraVENous Q4H PRN  
 ondansetron (ZOFRAN) injection 4 mg  4 mg IntraVENous Q4H PRN  
 sodium chloride (NS) flush 5-10 mL  5-10 mL IntraVENous Q8H  
 sodium chloride (NS) flush 5-10 mL  5-10 mL IntraVENous PRN  
 naloxone (NARCAN) injection 0.4 mg  0.4 mg IntraVENous PRN  
 budesonide (PULMICORT) 500 mcg/2 ml nebulizer suspension  500 mcg Nebulization BID RT  
 albuterol (PROVENTIL VENTOLIN) nebulizer solution 2.5 mg  2.5 mg Nebulization Q6H RT  
 
 
Review of Systems Constitutional: negative for fever, chills, sweats Cardiovascular: negative for chest pain, palpitations, syncope, edema Gastrointestinal:  negative for dysphagia, reflux, vomiting, diarrhea, abdominal pain, or melena Neurologic:  negative for focal weakness, numbness, headache Objective:  
 
Vitals:  
 09/26/18 0140 09/26/18 0505 09/26/18 0720 09/26/18 7818 BP:  117/74  138/84 Pulse:  78  70 Resp:  20  22 Temp:  97.6 °F (36.4 °C)  97.8 °F (36.6 °C) SpO2: 96% 97% 98% 98% Weight:  210 lb 1.6 oz (95.3 kg) Height:      
 
Intake and Output:  
09/24 1901 - 09/26 0700 In: 1920 [P.O.:1920] Out: 1500 [Urine:1500] Physical Exam:  
Constitution:  the patient is well developed and in no acute distress EENMT:  Sclera clear, pupils equal, oral mucosa moist 
Respiratory: Wheezing and rhonchi throughout, on 2L O2 NC Cardiovascular:  RRR without M,G,R 
 Gastrointestinal: soft and non-tender; with positive bowel sounds. Musculoskeletal: warm without cyanosis. There is no lower leg edema. Skin:  no jaundice or rashes, no wounds Neurologic: no gross neuro deficits Psychiatric:  alert and oriented x 3, pleasant and following commands CXR:  
9/26/2018 LAB No results for input(s): GLUCPOC in the last 72 hours. No lab exists for component: [place] Recent Labs  
   09/26/18 
 0345  09/25/18 
 6391  09/24/18 
 4621 WBC  22.5*  10.0  11.1 HGB  13.5*  12.7*  12.0*  
HCT  41.4  39.3*  37.1*  
PLT  243  214  192 INR  2.1  2.5  2.6 Recent Labs  
   09/26/18 
 0345  09/25/18 
 8961  09/24/18 
 0804  09/24/18 
 4415  09/23/18 
 2235 NA  142  143   --   143   --   
K  3.9  4.0   --   3.9   --   
CL  112*  113*   --   112*   --   
CO2  17*  19*   --   21   --   
GLU  155*  174*   --   126*   --   
BUN  17  13   --   16   --   
CREA  1.16  1.10   --   1.23   --   
MG  2.2  2.2   --   2.2   --   
CA  8.2*  8.0*   --   7.5*   --   
TROIQ   --    --   Elevated troponin which has been previously called. 0.41*  0.40* No results for input(s): PH, PCO2, PO2, HCO3 in the last 72 hours. No results for input(s): LCAD, LAC in the last 72 hours. Assessment:  (Medical Decision Making) Hospital Problems  Date Reviewed: 9/26/2018 Codes Class Noted POA Anticoagulated on Coumadin (Chronic) ICD-10-CM: Z51.81, Z79.01 
ICD-9-CM: V58.83, V58.61  9/24/2018 Yes Overview Signed 9/24/2018  3:02 PM by Meredith Bonilla NP For aortic valve. INR 2.1 this am  
 Current chronic use of systemic steroids (Chronic) ICD-10-CM: Z79.52 
ICD-9-CM: V58.65  9/24/2018 Yes Overview Signed 9/24/2018  3:03 PM by Meredith Bonilla NP Prednisone 5 mg daily for COPD On oral prednisone Acquired hypothyroidism (Chronic) ICD-10-CM: E03.9 ICD-9-CM: 244.9  9/23/2018 Yes Chronic, on Synthroid COPD (chronic obstructive pulmonary disease) (HCC) (Chronic) ICD-10-CM: J44.9 ICD-9-CM: 361  9/19/2018 Yes Chronic, former smoker S/P AVR (aortic valve replacement) (Chronic) ICD-10-CM: Z95.2 ICD-9-CM: V43.3  5/25/2016 Yes Chronic, Done in 2016 Coronary atherosclerosis of native coronary artery (Chronic) ICD-10-CM: I25.10 ICD-9-CM: 414.01  10/22/2015 Yes Chronic, recent PCI Mixed hyperlipidemia (Chronic) ICD-10-CM: Q30.7 ICD-9-CM: 272.2  10/22/2015 Yes Chronic, on Lipitor Tobacco use disorder (Chronic) ICD-10-CM: J34.727 ICD-9-CM: 305.1  10/22/2015 Yes Chronic, patient states he recently quit smoking Essential hypertension (Chronic) ICD-10-CM: I10 
ICD-9-CM: 401.9  10/22/2015 Yes Chronic * (Principal)Chest pain ICD-10-CM: R07.9 ICD-9-CM: 786.50  10/22/2015 Yes Troponin 0.40 on admission Plan:  (Medical Decision Making) --Continue oral prednisone 
--Wean supplemental O2 
--Continue smoking cessation 
--Continue nebulizers More than 50% of the time documented was spent in face-to-face contact with the patient and in the care of the patient on the floor/unit where the patient is located. Fortino Lam NP Lungs:  Diminished Heart:  RRR with no Murmur/Rubs/Gallops Additional Comments:  He is ready to go home on prednisone taper, he needs to be ambulated on RA to make sure he does not need 02, he will follow up with  in [place]. I have spoken with and examined the patient. I agree with the above assessment and plan as documented.  
 
Rajinder Burnett MD

## 2018-09-26 NOTE — PROGRESS NOTES
[facility] CARDIOLOGY PROGRESS NOTE 
      
 
9/26/2018 7:41 AM 
 
Admit Date: 9/23/2018 Subjective:  
Appears clinically improved with treatment for COPD. Troponins trending down for MI last week and echo with normal LVEF. No additional CP since treatment for COPD. 
 
ROS: 
Cardiovascular:  As noted above Objective:  
  
Vitals:  
 09/26/18 5677 09/26/18 0140 09/26/18 0505 09/26/18 0720 BP: 138/78  117/74 Pulse: 84  78 Resp: 20  20 Temp: 97.7 °F (36.5 °C)  97.6 °F (36.4 °C) SpO2: 97% 96% 97% 98% Weight:   95.3 kg (210 lb 1.6 oz) Height:      
 
 
Physical Exam: 
General-No Acute Distress Neck- supple, no JVD 
CV- regular rate and rhythm no MRG Lung- wheeze bilaterally Abd- soft, nontender, nondistended Ext- no edema bilaterally. Skin- warm and dry Data Review:  
Recent Labs  
   09/26/18 
 0345  09/25/18 
 4449  09/24/18 
 9955  09/24/18 
 6267 NA  142  143   --   143  
K  3.9  4.0   --   3.9 MG  2.2  2.2   --   2.2 BUN  17  13   --   16  
CREA  1.16  1.10   --   1.23  
GLU  155*  174*   --   126* WBC  22.5*  10.0   --   11.1 HGB  13.5*  12.7*   --   12.0*  
HCT  41.4  39.3*   --   37.1*  
PLT  243  214   --   192 INR  2.1  2.5   --   2.6  
TROIQ   --    --   Elevated troponin which has been previously called. 0.41* Assessment/Plan:  
 
Principal Problem: 
  Chest pain (10/22/2015) Suspect due to COPD at this time. EF is normal and troponins consistent with MI last week. Active Problems: 
  Coronary atherosclerosis of native coronary artery (10/22/2015) Complex CAD and medical therapy appropriate. Mixed hyperlipidemia (10/22/2015) On statin therapy Tobacco use disorder (10/22/2015) Cessation education Essential hypertension (10/22/2015) This is well controlled S/P AVR (aortic valve replacement) (5/25/2016) mAVR and normal function. Restart warfarin COPD (chronic obstructive pulmonary disease) ([facility] [address] (9/19/2018) Severe and appreciate pulmonary help. Suspect most of symptoms currently are due to COPD. Certainly has complex CAD but echo with normal LVEF and troponins consistent with MI last week. Acquired hypothyroidism (9/23/2018) Chronic Anticoagulated on Coumadin (9/24/2018) INR 2.1 - restart warfarin Current chronic use of systemic steroids (9/24/2018) Per pulmonary Likely stable to DC home.    
 
 
Kevin Amador MD 
9/26/2018 7:41 AM

## 2018-09-27 ENCOUNTER — PATIENT OUTREACH (OUTPATIENT)
Dept: CASE MANAGEMENT | Age: 73
End: 2018-09-27

## 2018-09-27 NOTE — PROGRESS NOTES
Transition of Care Discharge Follow-up Questionnaire Date/Time of Call: 
 September 27, 2018 2:03PM  
What was the patient hospitalized for? Chest Pain Does the patient understand his/her diagnosis and/or treatment and what happened during the hospitalization? Care Coordinator spoke with patient Mr. Kassidy Alexander who agreed to Transitions of Care Outreach Call. Patient states understanding of diagnosis and treatment plan during hospitalization. Did the patient receive discharge instructions? Yes  
CM Assessed Risk for Readmission:  
 
 
 
 
Patient stated Risk for Readmission: Low to Moderate risk for readmit related to complications from Chest Pain and other comorbidities. Patient states he is doing really well and states he has no readmission. Review any discharge instructions (see discharge instructions/AVS in [facility]). Ask patient if they understand these. Do they have any questions? Care Coordinator reviewed discharge medications, diet and discharge instructions with patient. Patient states understanding of discharge instructions, patient states no questions. Were home services ordered (nursing, PT, OT, ST, etc.)? Yes, home health services ordered, patient declined home health services. If so, has the first visit occurred? If not, why? (Assist with coordination of services if necessary. ) 
 NA Was any DME ordered? Home oxygen tank If so, has it been received? If not, why?  (Assist patient in obtaining DME orders &/or equipment if necessary.) Yes, patient states home oxygen tank received. Complete a review of all medications (new, continued and discontinued meds per the D/C instructions and medication tab in [address] [place] [address]). Care Coordinator reviewed all medications with patient per [facility] who verbalized understanding. Medication which have changed: predniSONE (DELTASONE) 20 mg tablet Medications discontinued: metoprolol succinate (TOPROL-XL) 25 mg XL tablet VIT A/C/E AC/ZNOX/CUPRIC OXIDE (EYE VITAMIN AND MINERALS PO) 
 
 isosorbide mononitrate ER (IMDUR) 30 mg tablet Were all new prescriptions filled? If not, why?  (Assist patient in obtaining medications if necessary  escalate for CCM &/or SW if ongoing issues are verbalized by pt or anticipated) Yes Does the patient understand the purpose and dosing instructions for all medications? (If patient has questions, provide explanation and education.) Patient states understanding of current medications and dosing instructions. Care Coordinator educated patient on the importance of medication compliance and reporting medication side effects to PCP/Specialist.  
  
Does the patient have any problems in performing ADLs? (If patient is unable to perform ADLs  what is the limiting factor(s)? Do they have a support system that can assist? If no support system is present, discuss possible assistance that they may be able to obtain. Escalate for CCM/SW if ongoing issues are verbalized by pt or anticipated) Patient states he is independent with ADL's and ambulation. Patient states he is doing really well, using oxygen when needed. Does the patient have all follow-up appointments scheduled? 7 day f/up with PCP?  
(f/up with PCP may be w/in 14 days if patient has a f/up with their specialist w/in 7 days) 7-14 day f/up with specialist?  
(or per discharge instructions) If f/up has not been made  what actions has the care coordinator made to accomplish this? Has transportation been arranged? Yes Follow up with [facility] with Dr. Zahira Vance on 10/05/2018. 
 
10/3/2018 9:15 AM Phan Gooden MD [place] NA Yes Any other questions or concerns expressed by the patient?  
 
 No further needs identified: Patient instructed to call Care Coordinator if further questions or concerns arise Schedule next appointment with MAGDIEL Robins or refer to RN Case Manager/ per the workflow guidelines. When is care coordinators next follow-up call scheduled? If referred for CCM  what RN care manager was the referral assigned? NA Patient states no additional needs, issues, concerns, per patient no scheduled follow up call. Care Coordinator provided contact information if assistance is needed for concerns or questions. Patient declined referral for CCM services, patient states he is doing well and is able to manage. [place] Call Completed By: XIANG Clay Help [facility] Community Care Coordinator This note will not be viewable in [address]

## 2018-10-22 ENCOUNTER — HOSPITAL ENCOUNTER (INPATIENT)
Age: 73
LOS: 4 days | Discharge: HOME HEALTH | DRG: 251 | End: 2018-10-26
Attending: INTERNAL MEDICINE | Admitting: INTERNAL MEDICINE
Payer: COMMERCIAL

## 2018-10-22 PROBLEM — D72.829 LEUKOCYTOSIS: Status: ACTIVE | Noted: 2018-10-22

## 2018-10-22 PROBLEM — R53.1 WEAKNESS: Status: ACTIVE | Noted: 2018-10-22

## 2018-10-22 LAB
APPEARANCE UR: ABNORMAL
APTT PPP: 55.8 SEC (ref 23.2–35.3)
BACTERIA URNS QL MICRO: 0 /HPF
BILIRUB UR QL: NEGATIVE
CASTS URNS QL MICRO: ABNORMAL /LPF
COLOR UR: ABNORMAL
EPI CELLS #/AREA URNS HPF: ABNORMAL /HPF
GLUCOSE UR STRIP.AUTO-MCNC: NEGATIVE MG/DL
HGB UR QL STRIP: ABNORMAL
KETONES UR QL STRIP.AUTO: ABNORMAL MG/DL
LEUKOCYTE ESTERASE UR QL STRIP.AUTO: ABNORMAL
NITRITE UR QL STRIP.AUTO: NEGATIVE
PH UR STRIP: 6 [PH] (ref 5–9)
PROT UR STRIP-MCNC: 30 MG/DL
RBC #/AREA URNS HPF: >100 /HPF
SP GR UR REFRACTOMETRY: 1.02 (ref 1–1.02)
TROPONIN I SERPL-MCNC: 14.4 NG/ML (ref 0.02–0.05)
UROBILINOGEN UR QL STRIP.AUTO: 0.2 EU/DL (ref 0.2–1)
WBC URNS QL MICRO: ABNORMAL /HPF

## 2018-10-22 PROCEDURE — 74011250637 HC RX REV CODE- 250/637: Performed by: INTERNAL MEDICINE

## 2018-10-22 PROCEDURE — 81001 URINALYSIS AUTO W/SCOPE: CPT

## 2018-10-22 PROCEDURE — 74011250636 HC RX REV CODE- 250/636: Performed by: NURSE PRACTITIONER

## 2018-10-22 PROCEDURE — 87040 BLOOD CULTURE FOR BACTERIA: CPT

## 2018-10-22 PROCEDURE — 36415 COLL VENOUS BLD VENIPUNCTURE: CPT

## 2018-10-22 PROCEDURE — 93005 ELECTROCARDIOGRAM TRACING: CPT | Performed by: NURSE PRACTITIONER

## 2018-10-22 PROCEDURE — 87086 URINE CULTURE/COLONY COUNT: CPT

## 2018-10-22 PROCEDURE — 85730 THROMBOPLASTIN TIME PARTIAL: CPT

## 2018-10-22 PROCEDURE — 74011250637 HC RX REV CODE- 250/637: Performed by: NURSE PRACTITIONER

## 2018-10-22 PROCEDURE — 65660000000 HC RM CCU STEPDOWN

## 2018-10-22 PROCEDURE — 74011250637 HC RX REV CODE- 250/637: Performed by: PHYSICIAN ASSISTANT

## 2018-10-22 PROCEDURE — 84484 ASSAY OF TROPONIN QUANT: CPT

## 2018-10-22 RX ORDER — WARFARIN 2 MG/1
2 TABLET ORAL DAILY
COMMUNITY
End: 2018-11-16

## 2018-10-22 RX ORDER — AMOXICILLIN 500 MG/1
500 CAPSULE ORAL EVERY 8 HOURS
Status: DISCONTINUED | OUTPATIENT
Start: 2018-10-22 | End: 2018-10-22

## 2018-10-22 RX ORDER — NITROGLYCERIN 0.4 MG/1
0.4 TABLET SUBLINGUAL
Status: DISCONTINUED | OUTPATIENT
Start: 2018-10-22 | End: 2018-10-26 | Stop reason: HOSPADM

## 2018-10-22 RX ORDER — ATORVASTATIN CALCIUM 40 MG/1
40 TABLET, FILM COATED ORAL ONCE
Status: COMPLETED | OUTPATIENT
Start: 2018-10-22 | End: 2018-10-22

## 2018-10-22 RX ORDER — MORPHINE SULFATE 2 MG/ML
2 INJECTION, SOLUTION INTRAMUSCULAR; INTRAVENOUS
Status: DISCONTINUED | OUTPATIENT
Start: 2018-10-22 | End: 2018-10-26 | Stop reason: HOSPADM

## 2018-10-22 RX ORDER — TOPIRAMATE 25 MG/1
100 TABLET ORAL ONCE
Status: COMPLETED | OUTPATIENT
Start: 2018-10-22 | End: 2018-10-22

## 2018-10-22 RX ORDER — AMOXICILLIN 500 MG/1
500 TABLET, FILM COATED ORAL 3 TIMES DAILY
COMMUNITY
End: 2018-10-26

## 2018-10-22 RX ORDER — GUAIFENESIN 100 MG/5ML
81 LIQUID (ML) ORAL ONCE
Status: DISPENSED | OUTPATIENT
Start: 2018-10-22 | End: 2018-10-23

## 2018-10-22 RX ORDER — ASPIRIN 81 MG/1
81 TABLET ORAL DAILY
Status: DISCONTINUED | OUTPATIENT
Start: 2018-10-23 | End: 2018-10-26 | Stop reason: HOSPADM

## 2018-10-22 RX ORDER — CLOPIDOGREL BISULFATE 75 MG/1
75 TABLET ORAL DAILY
Status: DISCONTINUED | OUTPATIENT
Start: 2018-10-23 | End: 2018-10-24

## 2018-10-22 RX ORDER — TAMSULOSIN HYDROCHLORIDE 0.4 MG/1
0.4 CAPSULE ORAL ONCE
Status: COMPLETED | OUTPATIENT
Start: 2018-10-22 | End: 2018-10-22

## 2018-10-22 RX ORDER — ATORVASTATIN CALCIUM 40 MG/1
40 TABLET, FILM COATED ORAL DAILY
Status: DISCONTINUED | OUTPATIENT
Start: 2018-10-23 | End: 2018-10-26 | Stop reason: HOSPADM

## 2018-10-22 RX ORDER — SODIUM CHLORIDE 0.9 % (FLUSH) 0.9 %
5-10 SYRINGE (ML) INJECTION AS NEEDED
Status: DISCONTINUED | OUTPATIENT
Start: 2018-10-22 | End: 2018-10-26 | Stop reason: HOSPADM

## 2018-10-22 RX ORDER — LEVOTHYROXINE SODIUM 50 UG/1
25 TABLET ORAL ONCE
Status: COMPLETED | OUTPATIENT
Start: 2018-10-22 | End: 2018-10-22

## 2018-10-22 RX ORDER — LISINOPRIL 5 MG/1
2.5 TABLET ORAL DAILY
Status: DISCONTINUED | OUTPATIENT
Start: 2018-10-23 | End: 2018-10-26 | Stop reason: HOSPADM

## 2018-10-22 RX ORDER — LEVOTHYROXINE SODIUM 50 UG/1
25 TABLET ORAL
Status: DISCONTINUED | OUTPATIENT
Start: 2018-10-23 | End: 2018-10-26 | Stop reason: HOSPADM

## 2018-10-22 RX ORDER — CLOPIDOGREL BISULFATE 75 MG/1
75 TABLET ORAL ONCE
Status: COMPLETED | OUTPATIENT
Start: 2018-10-22 | End: 2018-10-22

## 2018-10-22 RX ORDER — HEPARIN SODIUM 5000 [USP'U]/100ML
12-25 INJECTION, SOLUTION INTRAVENOUS
Status: DISCONTINUED | OUTPATIENT
Start: 2018-10-22 | End: 2018-10-24 | Stop reason: SDUPTHER

## 2018-10-22 RX ORDER — TOPIRAMATE 25 MG/1
100 TABLET ORAL DAILY
Status: DISCONTINUED | OUTPATIENT
Start: 2018-10-23 | End: 2018-10-26 | Stop reason: HOSPADM

## 2018-10-22 RX ORDER — FAMOTIDINE 20 MG/1
40 TABLET, FILM COATED ORAL 2 TIMES DAILY
Status: DISCONTINUED | OUTPATIENT
Start: 2018-10-22 | End: 2018-10-26 | Stop reason: HOSPADM

## 2018-10-22 RX ORDER — LISINOPRIL 5 MG/1
2.5 TABLET ORAL ONCE
Status: COMPLETED | OUTPATIENT
Start: 2018-10-22 | End: 2018-10-22

## 2018-10-22 RX ORDER — SODIUM CHLORIDE 0.9 % (FLUSH) 0.9 %
5-10 SYRINGE (ML) INJECTION EVERY 8 HOURS
Status: DISCONTINUED | OUTPATIENT
Start: 2018-10-22 | End: 2018-10-26 | Stop reason: HOSPADM

## 2018-10-22 RX ORDER — TAMSULOSIN HYDROCHLORIDE 0.4 MG/1
0.4 CAPSULE ORAL DAILY
Status: DISCONTINUED | OUTPATIENT
Start: 2018-10-23 | End: 2018-10-26 | Stop reason: HOSPADM

## 2018-10-22 RX ORDER — PANTOPRAZOLE SODIUM 40 MG/1
40 TABLET, DELAYED RELEASE ORAL
Status: DISCONTINUED | OUTPATIENT
Start: 2018-10-23 | End: 2018-10-22

## 2018-10-22 RX ORDER — VENLAFAXINE HYDROCHLORIDE 75 MG/1
75 CAPSULE, EXTENDED RELEASE ORAL
Status: DISCONTINUED | OUTPATIENT
Start: 2018-10-22 | End: 2018-10-26 | Stop reason: HOSPADM

## 2018-10-22 RX ORDER — HYDROCODONE BITARTRATE AND ACETAMINOPHEN 7.5; 325 MG/1; MG/1
1 TABLET ORAL
Status: DISCONTINUED | OUTPATIENT
Start: 2018-10-22 | End: 2018-10-26 | Stop reason: HOSPADM

## 2018-10-22 RX ADMIN — VENLAFAXINE HYDROCHLORIDE 75 MG: 75 CAPSULE, EXTENDED RELEASE ORAL at 22:27

## 2018-10-22 RX ADMIN — FAMOTIDINE 40 MG: 20 TABLET ORAL at 22:27

## 2018-10-22 RX ADMIN — TAMSULOSIN HYDROCHLORIDE 0.4 MG: 0.4 CAPSULE ORAL at 18:04

## 2018-10-22 RX ADMIN — AMOXICILLIN 500 MG: 500 CAPSULE ORAL at 16:49

## 2018-10-22 RX ADMIN — CLOPIDOGREL BISULFATE 75 MG: 75 TABLET ORAL at 18:03

## 2018-10-22 RX ADMIN — HYDROCODONE BITARTRATE AND ACETAMINOPHEN 1 TABLET: 7.5; 325 TABLET ORAL at 22:56

## 2018-10-22 RX ADMIN — HEPARIN SODIUM 12 UNITS/KG/HR: 5000 INJECTION, SOLUTION INTRAVENOUS at 16:47

## 2018-10-22 RX ADMIN — LISINOPRIL 2.5 MG: 5 TABLET ORAL at 18:03

## 2018-10-22 RX ADMIN — Medication 5 ML: at 15:15

## 2018-10-22 RX ADMIN — LEVOTHYROXINE SODIUM 25 MCG: 50 TABLET ORAL at 18:03

## 2018-10-22 RX ADMIN — TOPIRAMATE 100 MG: 25 TABLET, FILM COATED ORAL at 18:03

## 2018-10-22 RX ADMIN — ATORVASTATIN CALCIUM 40 MG: 40 TABLET, FILM COATED ORAL at 18:03

## 2018-10-22 NOTE — PROGRESS NOTES
Hospitalist Progress Note   
10/22/2018 Admit Date: 10/22/2018  2:56 PM  
NAME: Yvonne Snell :  1945 MRN:  [id number] Attending: Donal Nolan MD 
PCP:  Yadi Barrera MD 
 
SUBJECTIVE:  
67 yo CM with past history of MVCAD s/p 2vCABG and 4 stents in 2018, aortic stenosis s/p aortic valve replacement, COPD, HLD presents from [facility] due to elevated troponins and is admitted for coronary artery disease workup. Hospitalist consulted for diarrhea and leukocytosis. Per patient and wife, patient recently hospitalized at [facility] from 10/11 thru 10/17 for altered mental status and diagnosed with UTI and treated with Augmentin (identity of infectious organism is not currently known). During this hospitalization, wife notes that he had received Augmentin, azithromycin, Zosyn, and vancomycin. He was given multiple laxatives as well that have since been discontinued. Patient was discharged with a 7-day course of Augmentin, of which he has completed 5 days. Previously, patient has been treated for COPD exacerbation during previous hospitalizations during September and treated with multiple antibiotics. He is also currently on prednisone 10 mg that he has been taking daily since the beginning of October for maintenance of his obstructive lung disease. He has not been able to eat much at home. No changes in food, symptoms have not been affected by consumption of food/liquids. No travel and no known sick contacts. No fevers/chills, chest pain, abdominal pain, nausea/vomiting. He has not noticed any BRBPR or melena. No urinary frequency, urgency, or dysuria. Review of Systems negative with exception of pertinent positives noted above PHYSICAL EXAM  
 
Visit Vitals /78 Pulse 71 Temp 98.4 °F (36.9 °C) Resp 18 Wt 85.4 kg (188 lb 3.2 oz) SpO2 98% BMI 28.62 kg/m² Temp (24hrs), Av.2 °F (36.8 °C), Min:98 °F (36.7 °C), Max:98.4 °F (36.9 °C) Oxygen Therapy O2 Sat (%): 98 % (10/22/18 1742) O2 Device: Nasal cannula (10/22/18 1742) O2 Flow Rate (L/min): 2 l/min (10/22/18 1742) Intake/Output Summary (Last 24 hours) at 10/22/2018 1853 Last data filed at 10/22/2018 1750 Gross per 24 hour Intake  Output 200 ml Net -200 ml General: No acute distress, pleasant   
Lungs:  CTA Bilaterally without R/R/W Heart:  Regular rate and rhythm,  No murmur, rub, or gallop Abdomen: Soft, mild TTP of LLQ, no rebound tenderness, no fluids waves, no suprapubic tenderness Extremities: No cyanosis, clubbing or edema Neurologic:  No focal deficits, AAO x3 
 
ASSESSMENT Active Hospital Problems Diagnosis Date Noted  Weakness 10/22/2018  Leukocytosis 10/22/2018  
 NSTEMI (non-ST elevated myocardial infarction) ([facility] [address] 09/19/2018  S/P CABG x 2 09/19/2018  S/P AVR (aortic valve replacement) 05/25/2016  Coronary atherosclerosis of native coronary artery 10/22/2015  Mixed hyperlipidemia 10/22/2015 Plan: # Acute watery diarrhea in the setting of multiple broad spectrum antibiotics and low dose corticosteroids 
- discontinue Augmentin, patient has no symptoms to suggest an active urinary infection 
- would hold off on all antibiotics right now pending further infectious workup 
- would order repeat CXR if not already done 
- agree with repeating cultures 
- agree with ordering C diff panel 
- added stool culture 
- if adding IVFs, would use gentle hydration 
- avoid prn laxatives 
- if C diff negative, can add loperamide for symptomatic relief 
- daily CBC to monitor WBC 
- if C diff positive, continue contact precautions and start on oral vancomycin 125 mg po q6h x 14 days 
- discontinue PPI (linked to increased risk of developing C diff), and substitute with famotidine Thank you for the consult. Hospitalist will continue to follow. Please page with any questions. Signed By: Nathan Hutson,  October 22, 2018

## 2018-10-22 NOTE — H&P
[facility] Cardiology History & Physical  
  
Date of  Admission: 10/22/2018  2:56 PM  
 
Primary Care Physician: Dr. Fransisca Rod Primary Cardiologist: Dr. Queenie Berry but wants to switch to [facility] office at d/c Admitting Physician: Dr. Mian Dave 
 
CC: elevated troponin consistent with NSTEMI  
 
HPI:  Julio Morales is a 68 y.o. male with prior history of coronary artery disease with two-vessel CABG in 1996 [saphenous vein graft to the LAD, saphenous vein graft to the distal RCA]. Also status post aortic valve replacement [25 mm [employer] mechanical valve] for severe aortic stenosis. Recent non-ST elevation MI and underwent PCI to lt main, LCx, proximal saphenous vein graft to the LAD; patent saphenous vein graft to the RCA (9/20/18). 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 Prior tobacco abuse and recently quit (9/18). Also COPD on oxygen supplementation with exertion (2L). Echo with preserved EF and no noted wall motion abnormalities [9/18]. Post stenting, noted readmit for COPD exacerbation (9/26/18). The readmitted to [facility] 10/11/18 with sepsis/UTI Patient presented to Ed at [facility] with c/o chest pain described as heaviness that started after near syncopal episode today along with weakness. He reports not being self since last several admission here and last admission to [facility] with sepsis. He woke up from sleeping in recliner and had urinated on self and got up to go to bathroom for BM when he reports having near syncopal episode and then loss of blowel function. Wife reports mild confusion as well. Labs in Ed showed trop 11.1 then repeat 13.7, , K+ 3.9, cr 1.12, INR 1.8, WBC 14. 7., lactate 1.1. He was transferred to [facility] for NSTEMI. He was given hep bolus/gtt, NTG paste, and asa prior to arrival.  Currently chest pain free but remains weak Past Medical History:  
Diagnosis Date  Aortic stenosis 10/22/2015  Chest pain 10/22/2015  COPD (chronic obstructive pulmonary disease) (HCC)  Coronary artery disease  Coronary atherosclerosis of native coronary artery 10/22/2015  CVA (cerebrovascular accident) ([facility] [address] 2004  
 right hemispheric  Essential hypertension 10/22/2015  Mixed hyperlipidemia 10/22/2015  Tobacco use disorder 10/22/2015 Past Surgical History:  
Procedure Laterality Date Dominic Colon Allergies Allergen Reactions  Advair Diskus [Fluticasone-Salmeterol] Unknown (comments)  
  rash Social History Socioeconomic History  Marital status:  Spouse name: Not on file  Number of children: Not on file  Years of education: Not on file  Highest education level: Not on file Social Needs  Financial resource strain: Not on file  Food insecurity - worry: Not on file  Food insecurity - inability: Not on file  Transportation needs - medical: Not on file  Transportation needs - non-medical: Not on file Occupational History  Not on file Tobacco Use  Smoking status: Former Smoker Packs/day: 0.25 Last attempt to quit: 2018 Years since quittin.0  Smokeless tobacco: Never Used  Tobacco comment: trying to quit Substance and Sexual Activity  Alcohol use: No  
 Drug use: Not on file  Sexual activity: Not on file Other Topics Concern  Not on file Social History Narrative  Not on file No family history on file. Current Facility-Administered Medications Medication Dose Route Frequency  [START ON 10/23/2018] aspirin delayed-release tablet 81 mg  81 mg Oral DAILY  [START ON 10/23/2018] atorvastatin (LIPITOR) tablet 40 mg  40 mg Oral DAILY  [START ON 10/23/2018] clopidogrel (PLAVIX) tablet 75 mg  75 mg Oral DAILY  HYDROcodone-acetaminophen (NORCO) 7.5-325 mg per tablet 1 Tab  1 Tab Oral Q6H PRN  
 [START ON 10/23/2018] levothyroxine (SYNTHROID) tablet 25 mcg  25 mcg Oral ACB  [START ON 10/23/2018] lisinopril (PRINIVIL, ZESTRIL) tablet 2.5 mg  2.5 mg Oral DAILY  nitroglycerin (NITROSTAT) tablet 0.4 mg  0.4 mg SubLINGual Q5MIN PRN  
 [START ON 10/23/2018] pantoprazole (PROTONIX) tablet 40 mg  40 mg Oral ACB  [START ON 10/23/2018] tamsulosin (FLOMAX) capsule 0.4 mg  0.4 mg Oral DAILY  [START ON 10/23/2018] tiotropium (SPIRIVA) inhalation capsule 18 mcg  1 Cap Inhalation DAILY  [START ON 10/23/2018] topiramate (TOPAMAX) tablet 100 mg  100 mg Oral DAILY  venlafaxine (EFFEXOR) tablet 75 mg  75 mg Oral QHS  sodium chloride (NS) flush 5-10 mL  5-10 mL IntraVENous Q8H  
 sodium chloride (NS) flush 5-10 mL  5-10 mL IntraVENous PRN  
 morphine injection 2 mg  2 mg IntraVENous Q4H PRN  
 heparin 25,000 units in dextrose 500 mL infusion  12-25 Units/kg/hr IntraVENous TITRATE Review of Systems Review of Systems Constitution: Positive for weakness and malaise/fatigue. Negative for diaphoresis. HENT: Negative for congestion. Cardiovascular: Positive for near-syncope. Negative for chest pain, claudication, cyanosis, dyspnea on exertion, irregular heartbeat, leg swelling, orthopnea, palpitations, paroxysmal nocturnal dyspnea and syncope. Respiratory: Negative for cough, shortness of breath and wheezing. Endocrine: Negative for cold intolerance and heat intolerance. Hematologic/Lymphatic: Does not bruise/bleed easily. Skin: Negative for nail changes. Gastrointestinal: Positive for bowel incontinence. Genitourinary: Positive for bladder incontinence. Neurological: Negative for dizziness and headaches. Subjective:  
 
Visit Vitals /72 Pulse (!) 58 Temp 98 °F (36.7 °C) Resp 17 SpO2 98% No intake/output data recorded. No intake/output data recorded. Physical Exam: 
General: Well Developed, Well Nourished, No Acute Distress HEENT: pupils equal and round, no abnormalities noted Neck: supple, no JVD, no carotid bruits Heart: E7N5 with click Lungs: Clear throughout auscultation bilaterally without adventitious sounds Abd: soft, nontender, nondistended, with good bowel sounds Ext: warm, trace edema bilaterally, calves supple/nontender, pulses 2+ bilaterally Skin: warm and dry Psychiatric: Normal mood and affect Neurologic: Alert and oriented X 3 Cardiographics Telemetry: SR 
ECG: ordered Echocardiogram: 9/19/18 showed -  Left ventricle: Systolic function was normal. Ejection fraction was estimated in the range of 55 % to 60 %. There were no regional wall motion abnormalities. There was mild concentric hypertrophy. -  Left atrium: The atrium was mildly dilated. -  Inferior vena cava, hepatic veins: The respirophasic change in diameter was less than 50%. -  Aortic valve: The aortic valve area by the continuity equation was 1.4 cm2.-  Mitral valve: There was mild annular calcification. There was mild  Regurgitation. -  Tricuspid valve: There was mild regurgitation. Labs: see above and chart Patient has been seen and examined by Dr. Archie Spivey and he agrees with the following assessment and plan: 
 
 Assessment/Plan:  
  
 Principal Problem: 
  NSTEMI (non-ST elevated myocardial infarction) ([facility] [address] (9/19/2018)- admit to tele; Will continue asa/plavix, statin, ACE-I but no BB with underlying bradycardia. Will trend troponins. Holding coumadin. Continue hep gtt. Npo after midnight for ? LHC in am. Rehan EKG and echo Active Problems: 
  Coronary atherosclerosis of native coronary artery (10/22/2015)- see above. Mixed hyperlipidemia (10/22/2015)- continue statin S/P AVR (aortic valve replacement) (5/25/2016) INR 1.8; will hold coumadin and continue hep gtt. S/P CABG x 2 (9/19/2018) with recent PCI to lt main, LCx, proximal saphenous vein graft to the LAD; patent saphenous vein graft to the RCA (9/20/18). See above meds.  
 
  Weakness (10/22/2018)- may need PT see patient once acute issues resolve. Checking UA/urine cx. Leukocytosis (10/22/2018)- will recheck UA/urine cx and bl cx x2. Chest xray OK at [facility]. Recent sepsis? UTI, reordered amoxicillin for 3 more days to finish home abx. Diarrhea- check stools for C. Diff. Hospitalist to see for leukocytosis and diarrhea Cindy Guzman NP 
10/22/2018 1:36 PM

## 2018-10-22 NOTE — PROGRESS NOTES
Problem: Falls - Risk of 
Goal: *Absence of Falls Document Zuleyma Montero Fall Risk and appropriate interventions in the flowsheet. Outcome: Progressing Towards Goal 
Fall Risk Interventions: 
Mobility Interventions: Communicate number of staff needed for ambulation/transfer, Patient to call before getting OOB Medication Interventions: Patient to call before getting OOB

## 2018-10-22 NOTE — PROGRESS NOTES
Skin assessment completed. Sacrum intact with no signs of pressure ulcer development. Abrasion right forearm covered with dressing. Pale BLE. Bruising BUE. Paxton face.

## 2018-10-22 NOTE — PROGRESS NOTES
Verbal and bedside report received from Neena Vasquez, [address] [facility]. Heparin drip verified.

## 2018-10-22 NOTE — PROGRESS NOTES
Verbal bedside report given to [facility] AT [facility], oncjeremy RN. Patient's situation, background, assessment and recommendations provided. Opportunity for questions provided. Oncoming RN assumed care of patient. Heparin IV drip verified at bedside with oncoming RN.

## 2018-10-23 LAB
ANION GAP SERPL CALC-SCNC: 10 MMOL/L (ref 7–16)
APTT PPP: 109.1 SEC (ref 23.2–35.3)
APTT PPP: 150 SEC (ref 23.2–35.3)
APTT PPP: 83 SEC (ref 23.2–35.3)
BASOPHILS # BLD: 0.1 K/UL (ref 0–0.2)
BASOPHILS NFR BLD: 1 % (ref 0–2)
BUN SERPL-MCNC: 13 MG/DL (ref 8–23)
CALCIUM SERPL-MCNC: 7.9 MG/DL (ref 8.3–10.4)
CHLORIDE SERPL-SCNC: 108 MMOL/L (ref 98–107)
CO2 SERPL-SCNC: 20 MMOL/L (ref 21–32)
CREAT SERPL-MCNC: 1.01 MG/DL (ref 0.8–1.5)
DIFFERENTIAL METHOD BLD: ABNORMAL
EOSINOPHIL # BLD: 0.1 K/UL (ref 0–0.8)
EOSINOPHIL NFR BLD: 1 % (ref 0.5–7.8)
ERYTHROCYTE [DISTWIDTH] IN BLOOD BY AUTOMATED COUNT: 13.7 %
GLUCOSE SERPL-MCNC: 116 MG/DL (ref 65–100)
HCT VFR BLD AUTO: 32.9 % (ref 41.1–50.3)
HGB BLD-MCNC: 11.3 G/DL (ref 13.6–17.2)
IMM GRANULOCYTES # BLD: 0.2 K/UL (ref 0–0.5)
IMM GRANULOCYTES NFR BLD AUTO: 2 % (ref 0–5)
LYMPHOCYTES # BLD: 1.6 K/UL (ref 0.5–4.6)
LYMPHOCYTES NFR BLD: 16 % (ref 13–44)
MAGNESIUM SERPL-MCNC: 2.1 MG/DL (ref 1.8–2.4)
MCH RBC QN AUTO: 31.6 PG (ref 26.1–32.9)
MCHC RBC AUTO-ENTMCNC: 34.3 G/DL (ref 31.4–35)
MCV RBC AUTO: 91.9 FL (ref 79.6–97.8)
MONOCYTES # BLD: 1.3 K/UL (ref 0.1–1.3)
MONOCYTES NFR BLD: 12 % (ref 4–12)
NEUTS SEG # BLD: 7 K/UL (ref 1.7–8.2)
NEUTS SEG NFR BLD: 68 % (ref 43–78)
NRBC # BLD: 0 K/UL (ref 0–0.2)
PLATELET # BLD AUTO: 297 K/UL (ref 150–450)
PMV BLD AUTO: 9.2 FL (ref 9.4–12.3)
POTASSIUM SERPL-SCNC: 3 MMOL/L (ref 3.5–5.1)
RBC # BLD AUTO: 3.58 M/UL (ref 4.23–5.6)
SODIUM SERPL-SCNC: 138 MMOL/L (ref 136–145)
TROPONIN I SERPL-MCNC: 10.1 NG/ML (ref 0.02–0.05)
WBC # BLD AUTO: 10.3 K/UL (ref 4.3–11.1)

## 2018-10-23 PROCEDURE — 94640 AIRWAY INHALATION TREATMENT: CPT

## 2018-10-23 PROCEDURE — 74011000250 HC RX REV CODE- 250: Performed by: INTERNAL MEDICINE

## 2018-10-23 PROCEDURE — 80048 BASIC METABOLIC PNL TOTAL CA: CPT

## 2018-10-23 PROCEDURE — 85730 THROMBOPLASTIN TIME PARTIAL: CPT

## 2018-10-23 PROCEDURE — 65660000000 HC RM CCU STEPDOWN

## 2018-10-23 PROCEDURE — 84484 ASSAY OF TROPONIN QUANT: CPT

## 2018-10-23 PROCEDURE — C8929 TTE W OR WO FOL WCON,DOPPLER: HCPCS

## 2018-10-23 PROCEDURE — 74011250636 HC RX REV CODE- 250/636: Performed by: NURSE PRACTITIONER

## 2018-10-23 PROCEDURE — 36415 COLL VENOUS BLD VENIPUNCTURE: CPT

## 2018-10-23 PROCEDURE — 85025 COMPLETE CBC W/AUTO DIFF WBC: CPT

## 2018-10-23 PROCEDURE — 74011250636 HC RX REV CODE- 250/636: Performed by: INTERNAL MEDICINE

## 2018-10-23 PROCEDURE — 74011250637 HC RX REV CODE- 250/637: Performed by: INTERNAL MEDICINE

## 2018-10-23 PROCEDURE — 94760 N-INVAS EAR/PLS OXIMETRY 1: CPT

## 2018-10-23 PROCEDURE — 74011250637 HC RX REV CODE- 250/637: Performed by: NURSE PRACTITIONER

## 2018-10-23 PROCEDURE — 74011250636 HC RX REV CODE- 250/636

## 2018-10-23 PROCEDURE — 77010033678 HC OXYGEN DAILY

## 2018-10-23 PROCEDURE — 83735 ASSAY OF MAGNESIUM: CPT

## 2018-10-23 RX ORDER — POTASSIUM CHLORIDE 20 MEQ/1
40 TABLET, EXTENDED RELEASE ORAL 2 TIMES DAILY
Status: COMPLETED | OUTPATIENT
Start: 2018-10-23 | End: 2018-10-23

## 2018-10-23 RX ORDER — POTASSIUM CHLORIDE 20 MEQ/1
40 TABLET, EXTENDED RELEASE ORAL
Status: COMPLETED | OUTPATIENT
Start: 2018-10-23 | End: 2018-10-23

## 2018-10-23 RX ORDER — HEPARIN SODIUM 5000 [USP'U]/ML
35 INJECTION, SOLUTION INTRAVENOUS; SUBCUTANEOUS ONCE
Status: COMPLETED | OUTPATIENT
Start: 2018-10-23 | End: 2018-10-23

## 2018-10-23 RX ADMIN — Medication 10 ML: at 16:39

## 2018-10-23 RX ADMIN — FAMOTIDINE 40 MG: 20 TABLET ORAL at 08:02

## 2018-10-23 RX ADMIN — POTASSIUM CHLORIDE 40 MEQ: 20 TABLET, EXTENDED RELEASE ORAL at 08:02

## 2018-10-23 RX ADMIN — ASPIRIN 81 MG: 81 TABLET, COATED ORAL at 08:02

## 2018-10-23 RX ADMIN — POTASSIUM CHLORIDE 40 MEQ: 20 TABLET, EXTENDED RELEASE ORAL at 17:27

## 2018-10-23 RX ADMIN — CLOPIDOGREL BISULFATE 75 MG: 75 TABLET ORAL at 08:02

## 2018-10-23 RX ADMIN — PERFLUTREN 1 ML: 6.52 INJECTION, SUSPENSION INTRAVENOUS at 09:00

## 2018-10-23 RX ADMIN — Medication 10 ML: at 06:37

## 2018-10-23 RX ADMIN — ATORVASTATIN CALCIUM 40 MG: 40 TABLET, FILM COATED ORAL at 11:39

## 2018-10-23 RX ADMIN — Medication 5 ML: at 21:05

## 2018-10-23 RX ADMIN — HEPARIN SODIUM 3000 UNITS: 5000 INJECTION INTRAVENOUS; SUBCUTANEOUS at 00:24

## 2018-10-23 RX ADMIN — LISINOPRIL 2.5 MG: 5 TABLET ORAL at 08:02

## 2018-10-23 RX ADMIN — TAMSULOSIN HYDROCHLORIDE 0.4 MG: 0.4 CAPSULE ORAL at 08:03

## 2018-10-23 RX ADMIN — HEPARIN SODIUM 12 UNITS/KG/HR: 5000 INJECTION, SOLUTION INTRAVENOUS at 14:38

## 2018-10-23 RX ADMIN — LEVOTHYROXINE SODIUM 25 MCG: 50 TABLET ORAL at 08:02

## 2018-10-23 RX ADMIN — HYDROCODONE BITARTRATE AND ACETAMINOPHEN 1 TABLET: 7.5; 325 TABLET ORAL at 21:04

## 2018-10-23 RX ADMIN — VENLAFAXINE HYDROCHLORIDE 75 MG: 75 CAPSULE, EXTENDED RELEASE ORAL at 21:07

## 2018-10-23 RX ADMIN — POTASSIUM CHLORIDE 40 MEQ: 20 TABLET, EXTENDED RELEASE ORAL at 20:52

## 2018-10-23 RX ADMIN — TOPIRAMATE 100 MG: 25 TABLET, FILM COATED ORAL at 08:03

## 2018-10-23 RX ADMIN — FAMOTIDINE 40 MG: 20 TABLET ORAL at 17:27

## 2018-10-23 RX ADMIN — TIOTROPIUM BROMIDE 18 MCG: 18 CAPSULE ORAL; RESPIRATORY (INHALATION) at 08:32

## 2018-10-23 NOTE — PROGRESS NOTES
10/23/2018 6:05 PM 
 
Admit Date: 10/22/2018 Admit Diagnosis: NSTEMI;NSTEMI (non-ST elevated myocardial infarction) ([facility] Subjective: No cp or sob Objective:  
  
Visit Vitals BP 91/55 Pulse 77 Temp 99.2 °F (37.3 °C) Resp 18 Wt 85.7 kg (189 lb) SpO2 97% BMI 28.74 kg/m² Physical Exam: 
Maryjo Koyanagi, Well Nourished, No Acute Distress, Alert & Oriented x 3, appropriate mood. Neck- supple, no JVD 
CV- regular rate and rhythm no MRG Lung- clear bilaterally Abd- soft, nontender, nondistended Ext- no edema bilaterally. Skin- warm and dry Data Review:  
Recent Labs 10/23/18 
0355   
K 3.0*  
BUN 13  
CREA 1.01  
WBC 10.3 HGB 11.3* HCT 32.9*  
 Assessment/Plan:  
 
Principal Problem: 
  NSTEMI (non-ST elevated myocardial infarction) ([facility] (9/19/2018)Stable. Continue current medical therapy. Cath when medical issues stable Active Problems: 
  Coronary atherosclerosis of native coronary artery (10/22/2015) Mixed hyperlipidemia (10/22/2015)Stable. Continue current medical therapy. S/P AVR (aortic valve replacement) (5/25/2016) S/P CABG x 2 (9/19/2018) Weakness (10/22/2018)Stable. Continue current medical therapy. Leukocytosis (10/22/2018)- cultures pending

## 2018-10-23 NOTE — PROGRESS NOTES
Problem: Pressure Injury - Risk of 
Goal: *Prevention of pressure injury Document Danny Scale and appropriate interventions in the flowsheet. Outcome: Progressing Towards Goal 
Pressure Injury Interventions: 
Sensory Interventions: Assess changes in LOC Moisture Interventions: Absorbent underpads, Apply protective barrier, creams and emollients Activity Interventions: Increase time out of bed, Pressure redistribution bed/mattress(bed type), PT/OT evaluation Mobility Interventions: Pressure redistribution bed/mattress (bed type), PT/OT evaluation Nutrition Interventions: Document food/fluid/supplement intake, Offer support with meals,snacks and hydration Problem: Falls - Risk of 
Goal: *Absence of Falls Document April Nahid Fall Risk and appropriate interventions in the flowsheet. Outcome: Progressing Towards Goal 
Fall Risk Interventions: 
Mobility Interventions: Bed/chair exit alarm, Patient to call before getting OOB Medication Interventions: Patient to call before getting OOB, Teach patient to arise slowly

## 2018-10-23 NOTE — PROGRESS NOTES
Called patient today to follow up regarding x-ray exposure of 8752 mGy during a cath on 9/20/2018. Patient's family member Karla Reed helped to confirm that patient has no redness or skin irritation.

## 2018-10-23 NOTE — PROGRESS NOTES
Hospitalist Progress Note   
10/23/2018 Admit Date: 10/22/2018  2:56 PM  
NAME: Nuzhat Schaefer :  1945 DOS:              10/23/18 MRN:  [id number] Attending: Samantha Marroquin MD 
PCP:  Ellen Long MD 
Treatment Team: Attending Provider: Bouchra De La Cruz MD; Care Manager: Nelly Dakin; Consulting Provider: Kelsi Willson MD; Utilization Review: Beny Peterson RN Full Code SUBJECTIVE: As previously documented: 69 yo CM with past history of MVCAD s/p 2vCABG and 4 stents in 2018, aortic stenosis s/p aortic valve replacement, COPD, HLD presents from [facility] due to elevated troponins and is admitted for coronary artery disease workup. Hospitalist consulted for diarrhea and leukocytosis. Wife reported patient had multiple course abxs for UTI and AMS. Also she reported patient had laxatives. Cardiology managing NSTEMI, possible LHC. 10/23/18    Nuzhat Schaefer stated last BM was on admission. Wife reported she believes diarrhea is related to laxatives. 10+ ROS reviewed and negative except for positive in HPI. Allergies Allergen Reactions  Advair Diskus [Fluticasone-Salmeterol] Unknown (comments)  
  rash  Prozac [Fluoxetine] Unable to Obtain Current Facility-Administered Medications Medication Dose Route Frequency  potassium chloride (K-DUR, KLOR-CON) SR tablet 40 mEq  40 mEq Oral BID  aspirin delayed-release tablet 81 mg  81 mg Oral DAILY  atorvastatin (LIPITOR) tablet 40 mg  40 mg Oral DAILY  clopidogrel (PLAVIX) tablet 75 mg  75 mg Oral DAILY  HYDROcodone-acetaminophen (NORCO) 7.5-325 mg per tablet 1 Tab  1 Tab Oral Q6H PRN  
 levothyroxine (SYNTHROID) tablet 25 mcg  25 mcg Oral ACB  lisinopril (PRINIVIL, ZESTRIL) tablet 2.5 mg  2.5 mg Oral DAILY  nitroglycerin (NITROSTAT) tablet 0.4 mg  0.4 mg SubLINGual Q5MIN PRN  
 tamsulosin (FLOMAX) capsule 0.4 mg  0.4 mg Oral DAILY  tiotropium (SPIRIVA) inhalation capsule 18 mcg  1 Cap Inhalation DAILY  topiramate (TOPAMAX) tablet 100 mg  100 mg Oral DAILY  venlafaxine-SR (EFFEXOR-XR) capsule 75 mg  75 mg Oral QHS  sodium chloride (NS) flush 5-10 mL  5-10 mL IntraVENous Q8H  
 sodium chloride (NS) flush 5-10 mL  5-10 mL IntraVENous PRN  
 morphine injection 2 mg  2 mg IntraVENous Q4H PRN  
 heparin 25,000 units in dextrose 500 mL infusion  12-25 Units/kg/hr IntraVENous TITRATE  famotidine (PEPCID) tablet 40 mg  40 mg Oral BID Immunization History Administered Date(s) Administered  Influenza Vaccine (Quad) PF 2018 Objective:  
 
Patient Vitals for the past 24 hrs: 
 Temp Pulse Resp BP SpO2  
10/23/18 1306 98.4 °F (36.9 °C) 84 18 99/56 99 % 10/23/18 0833     94 % 10/23/18 0802 98.3 °F (36.8 °C) 87 17 99/62 97 % 10/23/18 0530 97.5 °F (36.4 °C) 78 18 91/46 96 % 10/23/18 0128 98.6 °F (37 °C) 77 18 103/57 95 % 10/22/18 2148 98.9 °F (37.2 °C) 78 18 105/63 96 % 10/22/18 1742 98.4 °F (36.9 °C) 71 18 132/78 98 % 10/22/18 1547 98 °F (36.7 °C) (!) 58 17 125/72 98 % Temp (24hrs), Av.3 °F (36.8 °C), Min:97.5 °F (36.4 °C), Max:98.9 °F (37.2 °C) Oxygen Therapy O2 Sat (%): 99 % (10/23/18 1306) Pulse via Oximetry: 82 beats per minute (10/23/18 6190) O2 Device: Nasal cannula (10/23/18 1306) O2 Flow Rate (L/min): 3 l/min (10/23/18 1306) Oxygen Therapy O2 Sat (%): 99 % (10/23/18 1306) Pulse via Oximetry: 82 beats per minute (10/23/18 5122) O2 Device: Nasal cannula (10/23/18 1306) O2 Flow Rate (L/min): 3 l/min (10/23/18 1306) Physical Exam: 
General:         Alert, cooperative, no distress HEENT:               NCAT. No obvious deformity. Lungs: No wheezing/rhonchi/rales Cardiovascular:   RRR. No m/r/g. No pedal edema b/l. Abdomen:       S/nt/nd. Bowel sounds normal. .  
Skin:         No rashes or lesions. Not Jaundiced Neurologic:    No gross focal deficit. Moves all extremities. Psychiatric:         Good mood. Normal affect. DIAGNOSTIC STUDIES Data Review:  
Recent Results (from the past 24 hour(s)) CULTURE, BLOOD Collection Time: 10/22/18  5:08 PM  
Result Value Ref Range Special Requests: LEFT Antecubital 
    
 Culture result: NO GROWTH AFTER 13 HOURS    
CULTURE, BLOOD Collection Time: 10/22/18  5:09 PM  
Result Value Ref Range Special Requests: RIGHT 
HAND Culture result: NO GROWTH AFTER 13 HOURS    
EKG, 12 LEAD, INITIAL Collection Time: 10/22/18  5:16 PM  
Result Value Ref Range Ventricular Rate 81 BPM  
 Atrial Rate 81 BPM  
 P-R Interval 158 ms QRS Duration 92 ms Q-T Interval 426 ms  
 QTC Calculation (Bezet) 494 ms Calculated P Axis 32 degrees Calculated R Axis 0 degrees Calculated T Axis 87 degrees Diagnosis Sinus rhythm with Premature atrial complexes Nonspecific ST and T wave abnormality Prolonged QT Abnormal ECG When compared with ECG of 21-SEP-2018 07:23, 
Premature atrial complexes are now Present QT has lengthened URINALYSIS W/ RFLX MICROSCOPIC Collection Time: 10/22/18  5:29 PM  
Result Value Ref Range Color DEE Appearance CLOUDY Specific gravity 1.022 1.001 - 1.023    
 pH (UA) 6.0 5.0 - 9.0 Protein 30 (A) NEG mg/dL Glucose NEGATIVE  mg/dL Ketone TRACE (A) NEG mg/dL Bilirubin NEGATIVE  NEG Blood LARGE (A) NEG Urobilinogen 0.2 0.2 - 1.0 EU/dL Nitrites NEGATIVE  NEG Leukocyte Esterase TRACE (A) NEG    
 WBC 5-10 0 /hpf  
 RBC >100 (H) 0 /hpf Epithelial cells 0-3 0 /hpf Bacteria 0 0 /hpf Casts 3-5 0 /lpf CULTURE, URINE Collection Time: 10/22/18  5:29 PM  
Result Value Ref Range Special Requests: NO SPECIAL REQUESTS Culture result:     
  NO GROWTH AFTER SHORT PERIOD OF INCUBATION. FURTHER RESULTS TO FOLLOW AFTER OVERNIGHT INCUBATION.   
TROPONIN I  
 Collection Time: 10/22/18 10:10 PM  
Result Value Ref Range Troponin-I, Qt. 14.40 (HH) 0.02 - 0.05 NG/ML  
PTT Collection Time: 10/22/18 10:10 PM  
Result Value Ref Range aPTT 55.8 (H) 23.2 - 35.3 SEC  
TROPONIN I Collection Time: 10/23/18  3:55 AM  
Result Value Ref Range Troponin-I, Qt. 10.10 (HH) 0.02 - 6.21 NG/ML  
METABOLIC PANEL, BASIC Collection Time: 10/23/18  3:55 AM  
Result Value Ref Range Sodium 138 136 - 145 mmol/L Potassium 3.0 (L) 3.5 - 5.1 mmol/L Chloride 108 (H) 98 - 107 mmol/L  
 CO2 20 (L) 21 - 32 mmol/L Anion gap 10 7 - 16 mmol/L Glucose 116 (H) 65 - 100 mg/dL BUN 13 8 - 23 MG/DL Creatinine 1.01 0.8 - 1.5 MG/DL  
 GFR est AA >60 >60 ml/min/1.73m2 GFR est non-AA >60 >60 ml/min/1.73m2 Calcium 7.9 (L) 8.3 - 10.4 MG/DL  
CBC WITH AUTOMATED DIFF Collection Time: 10/23/18  3:55 AM  
Result Value Ref Range WBC 10.3 4.3 - 11.1 K/uL  
 RBC 3.58 (L) 4.23 - 5.6 M/uL  
 HGB 11.3 (L) 13.6 - 17.2 g/dL HCT 32.9 (L) 41.1 - 50.3 % MCV 91.9 79.6 - 97.8 FL  
 MCH 31.6 26.1 - 32.9 PG  
 MCHC 34.3 31.4 - 35.0 g/dL  
 RDW 13.7 % PLATELET 586 364 - 295 K/uL MPV 9.2 (L) 9.4 - 12.3 FL ABSOLUTE NRBC 0.00 0.0 - 0.2 K/uL  
 DF AUTOMATED NEUTROPHILS 68 43 - 78 % LYMPHOCYTES 16 13 - 44 % MONOCYTES 12 4.0 - 12.0 % EOSINOPHILS 1 0.5 - 7.8 % BASOPHILS 1 0.0 - 2.0 % IMMATURE GRANULOCYTES 2 0.0 - 5.0 %  
 ABS. NEUTROPHILS 7.0 1.7 - 8.2 K/UL  
 ABS. LYMPHOCYTES 1.6 0.5 - 4.6 K/UL  
 ABS. MONOCYTES 1.3 0.1 - 1.3 K/UL  
 ABS. EOSINOPHILS 0.1 0.0 - 0.8 K/UL  
 ABS. BASOPHILS 0.1 0.0 - 0.2 K/UL  
 ABS. IMM. GRANS. 0.2 0.0 - 0.5 K/UL MAGNESIUM Collection Time: 10/23/18  3:55 AM  
Result Value Ref Range Magnesium 2.1 1.8 - 2.4 mg/dL PTT Collection Time: 10/23/18  6:21 AM  
Result Value Ref Range aPTT 150.0 (HH) 23.2 - 35.3 SEC  
PTT Collection Time: 10/23/18  1:26 PM  
Result Value Ref Range aPTT 83.0 (H) 23.2 - 35.3 SEC All Micro Results Procedure Component Value Units Date/Time CULTURE, URINE [[id number]] Collected:  10/22/18 1729 Order Status:  Completed Specimen:  Urine from Clean catch Updated:  10/23/18 0933 Special Requests: NO SPECIAL REQUESTS Culture result:    
  NO GROWTH AFTER SHORT PERIOD OF INCUBATION. FURTHER RESULTS TO FOLLOW AFTER OVERNIGHT INCUBATION. CULTURE, BLOOD [[id number]] Collected:  10/22/18 1708 Order Status:  Completed Specimen:  Blood Updated:  10/23/18 189 Special Requests: --     
  LEFT Antecubital 
  
  Culture result: NO GROWTH AFTER 13 HOURS     
 CULTURE, BLOOD [[id number]] Collected:  10/22/18 1709 Order Status:  Completed Specimen:  Blood Updated:  10/23/18 5340 Special Requests: --     
  RIGHT 
HAND Culture result: NO GROWTH AFTER 13 HOURS     
 CULTURE, STOOL [[id number]] Order Status:  Sent Specimen:  Stool C. DIFFICILE AG & TOXIN A/B [[id number]] Order Status:  Sent Specimen:  Stool Imaging Mery Risser /Studies: CXR Results  (Last 48 hours) None CT Results  (Last 48 hours) None No results found. Results for orders placed or performed during the hospital encounter of 10/22/18  
2D ECHO COMPLETE ADULT (TTE) W OR WO CONTR Narrative [address] Dr Yadav, [facility] 
[phone] Transthoracic Echocardiogram 
2D, M-mode, Doppler, and Color Doppler Patient: Rennis Shone 
MR #: [id number] : [id number] Age: 68 years Gender: Male Study date: 23-Oct-2018 Account #: [de-identified] Height: 68 in 
Weight: 188.5 lb 
BSA: 1.99 mï¾² Status:Routine Location: 331 BP: 91/ 46 Allergies: FLUTICASONE-SALMETEROL, FLUOXETINE Sonographer:  Be More RD Group:  [facility] Referring Physician:  Haleigh Archibald. [facility]cell[facility] Reading Physician:  Miguel Spivey MD [facility] INDICATIONS: Chest Pain HISTORY: PRIOR PROCEDURES: Mechanical prosthesis of aortic valve. PROCEDURE: This was a routine study. A transthoracic echocardiogram was 
performed. The study included complete 2D imaging, M-mode, complete spectral 
Doppler, and color Doppler. Intravenous contrast (Definity) was administered. Image quality was adequate. LEFT VENTRICLE: Size was normal. Systolic function was normal. Ejection 
fraction was estimated in the range of 55 % to 60 %. There were no regional 
wall motion abnormalities. Wall thickness was mildly increased. Left 
ventricular diastolic function parameters were normal. Average E/e'~ 13. RIGHT VENTRICLE: The size was normal. Systolic function was normal. The 
tricuspid jet envelope definition was inadequate for estimation of RV  
systolic 
pressure. LEFT ATRIUM: Size was at the upper limits of normal. 
 
RIGHT ATRIUM: Size was normal. 
 
SYSTEMIC VEINS: IVC: The inferior vena cava was normal in size and course. AORTIC VALVE: A St Sigifredo mechanical prosthesis was present (1996). The peak 
velocity was 3.08 m/s. The mean pressure gradient was 22 mmHg. The peak 
pressure gradient was 38 mmHg. ACT~ 103ms, DI~40, SVi~ 0.36. There was no 
insufficiency. MITRAL VALVE: Valve structure was normal. There was no evidence for stenosis. There was mild regurgitation. TRICUSPID VALVE: The valve structure was normal. There was no evidence for 
stenosis. There was trivial regurgitation. PULMONIC VALVE: Not well visualized. There was no evidence for stenosis. There 
was no insufficiency. PERICARDIUM: There was no pericardial effusion. AORTA: The root exhibited normal size. There was dilatation of the ascending 
aorta measuring 4.55cm. SUMMARY: 
 
-  Left ventricle: Systolic function was normal. Ejection fraction was 
estimated in the range of 55 % to 60 %. There were no regional wall motion 
abnormalities. Wall thickness was mildly increased. -  Aortic valve: A St Sigifredo mechanical prosthesis was present (1996). -  Mitral valve: There was mild regurgitation. 
 
-  Aorta, systemic arteries: There was dilatation of the ascending aorta 
measuring 4.55cm. SYSTEM MEASUREMENT TABLES 
 
2D mode AoR Diam (2D): 3.2 cm 
LA Dimension (2D): 3.4 cm Left Atrium Systolic Volume Index; Method of Disks, Biplane; 2D mode;: 33.5 
ml/m2 IVS/LVPW (2D): 1.1 IVSd (2D): 1.3 cm LVIDd (2D): 5 cm LVIDs (2D): 3 cm 
LVOT Area (2D): 3.5 cm2 LVPWd (2D): 1.2 cm Tissue Doppler Imaging LV Peak Early Echeverria Tissue Que; Mean; Lateral MA (TDI): 10 cm/s LV Peak Early Echeverria Tissue Que; Medial MA (TDI): 6.9 cm/s Unspecified Scan Mode Peak Grad; Mean; Antegrade Flow: 34 mm[Hg] Vmax; Antegrade Flow: 285 cm/s LVOT Diam: 2.1 cm 
MV Peak Que/LV Peak Tissue Que E-Wave; Lateral MA: 10.4 MV Peak Que/LV Peak Tissue Que E-Wave; Medial MA: 15.1 Prepared and signed by 
 
Mireille Hall MD [facility] Signed 23-Oct-2018 12:43:51 Labs and Studies from previous 24 hours have been personally reviewed by myself   
ASSESSMENT Active Hospital Problems Diagnosis Date Noted  Weakness 10/22/2018  Leukocytosis 10/22/2018  
 NSTEMI (non-ST elevated myocardial infarction) ([facility] [address] 09/19/2018  S/P CABG x 2 09/19/2018  S/P AVR (aortic valve replacement) 05/25/2016  Coronary atherosclerosis of native coronary artery 10/22/2015  Mixed hyperlipidemia 10/22/2015 Hospital Problems as of 10/23/2018 Date Reviewed: 10/3/2018 Codes Class Noted - Resolved POA Weakness ICD-10-CM: R53.1 ICD-9-CM: 780.79  10/22/2018 - Present Yes Leukocytosis ICD-10-CM: R13.722 ICD-9-CM: 288.60  10/22/2018 - Present Unknown S/P CABG x 2 (Chronic) ICD-10-CM: Z95.1 ICD-9-CM: V45.81  9/19/2018 - Present Yes * (Principal) NSTEMI (non-ST elevated myocardial infarction) ([facility] [address] ICD-10-CM: I21.4 ICD-9-CM: 410.70  9/19/2018 - Present Unknown S/P AVR (aortic valve replacement) (Chronic) ICD-10-CM: Z95.2 ICD-9-CM: V43.3  5/25/2016 - Present Yes Aortic stenosis ICD-10-CM: I35.0 ICD-9-CM: 424.1  10/22/2015 - Present Coronary atherosclerosis of native coronary artery (Chronic) ICD-10-CM: I25.10 ICD-9-CM: 414.01  10/22/2015 - Present Yes Mixed hyperlipidemia (Chronic) ICD-10-CM: E21.4 ICD-9-CM: 272.2  10/22/2015 - Present Yes A/P: 
 
-NSTEMI Management as per Cardiology team 
Currently on DAPT, statins and heparin drip. May need LHC 
 
-Diarrhea 
-leucocytosis Diarrhea likely secondary to laxatives WBC 10.3, patient has been afebrile UA with few WBC. Cultures NG so far Stool work up pending as last BM was prior to admission No on abxs as not source of infection identified  
 
-Aortic stenosis Management as per cardiology team 
 
-Hypokalemia Already having K+ replacement Will continue to follow Bean Hamilton MD 
10/23/18

## 2018-10-23 NOTE — PROGRESS NOTES
Bedside and Verbal shift change report given to sujit dumont (oncoming nurse) by self (offgoing nurse). Report included the following information SBAR, Kardex and Recent Results.

## 2018-10-23 NOTE — PROGRESS NOTES
Bedside and verbal report received from Archie Noble, [address] [facility] Heparin drip verified.

## 2018-10-23 NOTE — PROGRESS NOTES
Bedside and Verbal shift change report given to Hilda Pretty RN (oncoming nurse) by Silva Gann RN (offgoing nurse). Report included the following information SBAR, Kardex, Accordion and Recent Results.

## 2018-10-23 NOTE — PROGRESS NOTES
[facility] CARDIOLOGY PROGRESS NOTE 
      
 
10/23/2018 9:57 AM 
 
Admit Date: 10/22/2018 Subjective:  
Resting in bed. He denies nay chest pain and his SOB much improved. ROS: 
Cardiovascular:  As noted above Objective:  
  
Vitals:  
 10/23/18 0128 10/23/18 0530 10/23/18 0802 10/23/18 8949 BP: 103/57 91/46 99/62 Pulse: 77 78 87 Resp: 18 18 17 Temp: 98.6 °F (37 °C) 97.5 °F (36.4 °C) 98.3 °F (36.8 °C) SpO2: 95% 96% 97% 94% Weight:  85.7 kg (189 lb) Physical Exam: 
General-No Acute Distress Neck- supple, no JVD 
CV- regular rate with click Lung- clear bilaterally Abd- soft, nontender, nondistended Ext- trace edema bilaterally. Skin- warm and dry Data Review:  
Recent Labs 10/23/18 
0355 10/22/18 [address] [place]   --   
K 3.0*  --   
BUN 13  --   
CREA 1.01  --   
*  --   
WBC 10.3  --   
HGB 11.3*  --   
HCT 32.9*  --   
  --   
TROIQ 10.10* 14.40* Assessment/Plan:  
 
Principal Problem: 
  NSTEMI (non-ST elevated myocardial infarction) ([facility] [address] (9/19/2018)- troponin's trending down; Will continue medical treatment until w/u for diarrhea and leukocytosis completed. Likely need LHC after medical issues resolve. Will continue hep gtt (needs for 48 hrs), asa/plavix, statin, ACE-I but no BB with underlying bradycardia. Also continue to hold coumadin for likely LHC prior to d/c. Echo pending. Active Problems: 
  Coronary atherosclerosis of native coronary artery (10/22/2015) 
 s/p CABG x2  in 1996 [saphenous vein graft to the LAD, saphenous vein graft to the distal RCA]. Also status post aortic valve replacement [25 mm St. Sigifredo mechanical valve] for severe aortic stenosis.  Recent non-ST elevation MI and underwent PCI to lt main, LCx, proximal saphenous vein graft to the LAD; patent saphenous vein graft to the RCA (9/20/18). See above medical treatment. Mixed hyperlipidemia (10/22/2015) On statin S/P AVR (aortic valve replacement) (5/25/2016) Coumadin on hold, continue hep gtt S/P CABG x 2 (9/19/2018) See above. Weakness (10/22/2018) Will order PT to eval 
 
  Leukocytosis (10/22/2018) With diarrhea, appreciate Hospitalist help. Hospitalist stopped Augmentin, C. Diff and stool cx pending, and changed PPI to famotidine.   
 
 
 
 
Tyrese Hudson NP 
10/23/2018 9:57 AM

## 2018-10-24 ENCOUNTER — APPOINTMENT (OUTPATIENT)
Dept: GENERAL RADIOLOGY | Age: 73
DRG: 251 | End: 2018-10-24
Attending: INTERNAL MEDICINE
Payer: COMMERCIAL

## 2018-10-24 LAB
ANION GAP SERPL CALC-SCNC: 9 MMOL/L (ref 7–16)
APTT PPP: 90 SEC (ref 23.2–35.3)
ATRIAL RATE: 81 BPM
BASOPHILS # BLD: 0.1 K/UL (ref 0–0.2)
BASOPHILS NFR BLD: 1 % (ref 0–2)
BUN SERPL-MCNC: 12 MG/DL (ref 8–23)
CALCIUM SERPL-MCNC: 7.9 MG/DL (ref 8.3–10.4)
CALCULATED P AXIS, ECG09: 32 DEGREES
CALCULATED R AXIS, ECG10: 0 DEGREES
CALCULATED T AXIS, ECG11: 87 DEGREES
CHLORIDE SERPL-SCNC: 111 MMOL/L (ref 98–107)
CO2 SERPL-SCNC: 19 MMOL/L (ref 21–32)
CREAT SERPL-MCNC: 0.89 MG/DL (ref 0.8–1.5)
DIAGNOSIS, 93000: NORMAL
DIFFERENTIAL METHOD BLD: ABNORMAL
EOSINOPHIL # BLD: 0.1 K/UL (ref 0–0.8)
EOSINOPHIL NFR BLD: 1 % (ref 0.5–7.8)
ERYTHROCYTE [DISTWIDTH] IN BLOOD BY AUTOMATED COUNT: 14.2 %
GLUCOSE SERPL-MCNC: 87 MG/DL (ref 65–100)
HCT VFR BLD AUTO: 34.7 % (ref 41.1–50.3)
HGB BLD-MCNC: 11.2 G/DL (ref 13.6–17.2)
IMM GRANULOCYTES # BLD: 0.2 K/UL (ref 0–0.5)
IMM GRANULOCYTES NFR BLD AUTO: 2 % (ref 0–5)
LYMPHOCYTES # BLD: 1.2 K/UL (ref 0.5–4.6)
LYMPHOCYTES NFR BLD: 11 % (ref 13–44)
MAGNESIUM SERPL-MCNC: 2.2 MG/DL (ref 1.8–2.4)
MCH RBC QN AUTO: 30.3 PG (ref 26.1–32.9)
MCHC RBC AUTO-ENTMCNC: 32.3 G/DL (ref 31.4–35)
MCV RBC AUTO: 93.8 FL (ref 79.6–97.8)
MONOCYTES # BLD: 1.1 K/UL (ref 0.1–1.3)
MONOCYTES NFR BLD: 11 % (ref 4–12)
NEUTS SEG # BLD: 7.8 K/UL (ref 1.7–8.2)
NEUTS SEG NFR BLD: 75 % (ref 43–78)
NRBC # BLD: 0 K/UL (ref 0–0.2)
P-R INTERVAL, ECG05: 158 MS
PLATELET # BLD AUTO: 314 K/UL (ref 150–450)
PMV BLD AUTO: 9.6 FL (ref 9.4–12.3)
POTASSIUM SERPL-SCNC: 4 MMOL/L (ref 3.5–5.1)
Q-T INTERVAL, ECG07: 426 MS
QRS DURATION, ECG06: 92 MS
QTC CALCULATION (BEZET), ECG08: 494 MS
RBC # BLD AUTO: 3.7 M/UL (ref 4.23–5.6)
SODIUM SERPL-SCNC: 139 MMOL/L (ref 136–145)
VENTRICULAR RATE, ECG03: 81 BPM
WBC # BLD AUTO: 10.5 K/UL (ref 4.3–11.1)

## 2018-10-24 PROCEDURE — C1894 INTRO/SHEATH, NON-LASER: HCPCS

## 2018-10-24 PROCEDURE — 99153 MOD SED SAME PHYS/QHP EA: CPT

## 2018-10-24 PROCEDURE — 74011000258 HC RX REV CODE- 258: Performed by: INTERNAL MEDICINE

## 2018-10-24 PROCEDURE — 74011250637 HC RX REV CODE- 250/637: Performed by: INTERNAL MEDICINE

## 2018-10-24 PROCEDURE — 77030004534 HC CATH ANGI DX INFN CARD -A

## 2018-10-24 PROCEDURE — C1887 CATHETER, GUIDING: HCPCS

## 2018-10-24 PROCEDURE — 74011250636 HC RX REV CODE- 250/636

## 2018-10-24 PROCEDURE — 74011250637 HC RX REV CODE- 250/637: Performed by: NURSE PRACTITIONER

## 2018-10-24 PROCEDURE — 94640 AIRWAY INHALATION TREATMENT: CPT

## 2018-10-24 PROCEDURE — 4A023N7 MEASUREMENT OF CARDIAC SAMPLING AND PRESSURE, LEFT HEART, PERCUTANEOUS APPROACH: ICD-10-PCS | Performed by: INTERNAL MEDICINE

## 2018-10-24 PROCEDURE — 74011250636 HC RX REV CODE- 250/636: Performed by: INTERNAL MEDICINE

## 2018-10-24 PROCEDURE — 85025 COMPLETE CBC W/AUTO DIFF WBC: CPT

## 2018-10-24 PROCEDURE — 74011250636 HC RX REV CODE- 250/636: Performed by: NURSE PRACTITIONER

## 2018-10-24 PROCEDURE — 65660000000 HC RM CCU STEPDOWN

## 2018-10-24 PROCEDURE — 80048 BASIC METABOLIC PNL TOTAL CA: CPT

## 2018-10-24 PROCEDURE — 83735 ASSAY OF MAGNESIUM: CPT

## 2018-10-24 PROCEDURE — 85730 THROMBOPLASTIN TIME PARTIAL: CPT

## 2018-10-24 PROCEDURE — 92920 PRQ TRLUML C ANGIOP 1ART&/BR: CPT

## 2018-10-24 PROCEDURE — 77010033678 HC OXYGEN DAILY

## 2018-10-24 PROCEDURE — 71045 X-RAY EXAM CHEST 1 VIEW: CPT

## 2018-10-24 PROCEDURE — C1769 GUIDE WIRE: HCPCS

## 2018-10-24 PROCEDURE — 77030012468 HC VLV BLEEDBK CNTRL ABBT -B

## 2018-10-24 PROCEDURE — 93455 CORONARY ART/GRFT ANGIO S&I: CPT

## 2018-10-24 PROCEDURE — 02703ZZ DILATION OF CORONARY ARTERY, ONE ARTERY, PERCUTANEOUS APPROACH: ICD-10-PCS | Performed by: INTERNAL MEDICINE

## 2018-10-24 PROCEDURE — 36415 COLL VENOUS BLD VENIPUNCTURE: CPT

## 2018-10-24 PROCEDURE — B2111ZZ FLUOROSCOPY OF MULTIPLE CORONARY ARTERIES USING LOW OSMOLAR CONTRAST: ICD-10-PCS | Performed by: INTERNAL MEDICINE

## 2018-10-24 PROCEDURE — 74011636320 HC RX REV CODE- 636/320: Performed by: INTERNAL MEDICINE

## 2018-10-24 PROCEDURE — 77030013794 HC KT TRNSDUC BLD EDWD -B

## 2018-10-24 PROCEDURE — C1725 CATH, TRANSLUMIN NON-LASER: HCPCS

## 2018-10-24 PROCEDURE — 99152 MOD SED SAME PHYS/QHP 5/>YRS: CPT

## 2018-10-24 PROCEDURE — B2131ZZ FLUOROSCOPY OF MULTIPLE CORONARY ARTERY BYPASS GRAFTS USING LOW OSMOLAR CONTRAST: ICD-10-PCS | Performed by: INTERNAL MEDICINE

## 2018-10-24 RX ORDER — BIVALIRUDIN 250 MG/5ML
0.75 INJECTION, POWDER, LYOPHILIZED, FOR SOLUTION INTRAVENOUS ONCE
Status: COMPLETED | OUTPATIENT
Start: 2018-10-24 | End: 2018-10-24

## 2018-10-24 RX ORDER — ATROPINE SULFATE 0.1 MG/ML
INJECTION INTRAVENOUS
Status: ACTIVE
Start: 2018-10-24 | End: 2018-10-25

## 2018-10-24 RX ORDER — MIDAZOLAM HYDROCHLORIDE 1 MG/ML
.5-2 INJECTION, SOLUTION INTRAMUSCULAR; INTRAVENOUS
Status: DISCONTINUED | OUTPATIENT
Start: 2018-10-24 | End: 2018-10-26 | Stop reason: HOSPADM

## 2018-10-24 RX ORDER — LIDOCAINE HYDROCHLORIDE 10 MG/ML
3-10 INJECTION INFILTRATION; PERINEURAL
Status: DISCONTINUED | OUTPATIENT
Start: 2018-10-24 | End: 2018-10-26 | Stop reason: HOSPADM

## 2018-10-24 RX ORDER — SODIUM CHLORIDE 9 MG/ML
75 INJECTION, SOLUTION INTRAVENOUS CONTINUOUS
Status: DISCONTINUED | OUTPATIENT
Start: 2018-10-24 | End: 2018-10-26 | Stop reason: HOSPADM

## 2018-10-24 RX ORDER — METOPROLOL SUCCINATE 25 MG/1
25 TABLET, EXTENDED RELEASE ORAL DAILY
Status: DISCONTINUED | OUTPATIENT
Start: 2018-10-25 | End: 2018-10-26 | Stop reason: HOSPADM

## 2018-10-24 RX ORDER — HEPARIN SODIUM 200 [USP'U]/100ML
2 INJECTION, SOLUTION INTRAVENOUS CONTINUOUS
Status: DISCONTINUED | OUTPATIENT
Start: 2018-10-24 | End: 2018-10-26 | Stop reason: HOSPADM

## 2018-10-24 RX ADMIN — BIVALIRUDIN 1.75 MG/KG/HR: 250 INJECTION, POWDER, LYOPHILIZED, FOR SOLUTION INTRAVENOUS at 12:10

## 2018-10-24 RX ADMIN — VENLAFAXINE HYDROCHLORIDE 75 MG: 75 CAPSULE, EXTENDED RELEASE ORAL at 21:31

## 2018-10-24 RX ADMIN — SODIUM CHLORIDE 75 ML/HR: 900 INJECTION, SOLUTION INTRAVENOUS at 08:53

## 2018-10-24 RX ADMIN — LEVOTHYROXINE SODIUM 25 MCG: 50 TABLET ORAL at 05:45

## 2018-10-24 RX ADMIN — MIDAZOLAM HYDROCHLORIDE 2 MG: 1 INJECTION, SOLUTION INTRAMUSCULAR; INTRAVENOUS at 11:46

## 2018-10-24 RX ADMIN — TOPIRAMATE 100 MG: 25 TABLET, FILM COATED ORAL at 08:55

## 2018-10-24 RX ADMIN — LISINOPRIL 2.5 MG: 5 TABLET ORAL at 08:55

## 2018-10-24 RX ADMIN — IOPAMIDOL 150 ML: 755 INJECTION, SOLUTION INTRAVENOUS at 12:23

## 2018-10-24 RX ADMIN — HEPARIN SODIUM 12 UNITS/KG/HR: 5000 INJECTION, SOLUTION INTRAVENOUS at 07:01

## 2018-10-24 RX ADMIN — LIDOCAINE HYDROCHLORIDE 10 ML: 10 INJECTION, SOLUTION INFILTRATION; PERINEURAL at 11:46

## 2018-10-24 RX ADMIN — TAMSULOSIN HYDROCHLORIDE 0.4 MG: 0.4 CAPSULE ORAL at 08:55

## 2018-10-24 RX ADMIN — BIVALIRUDIN 64.5 MG: 250 INJECTION, POWDER, LYOPHILIZED, FOR SOLUTION INTRAVENOUS at 12:09

## 2018-10-24 RX ADMIN — HYDROCODONE BITARTRATE AND ACETAMINOPHEN 1 TABLET: 7.5; 325 TABLET ORAL at 18:08

## 2018-10-24 RX ADMIN — TIOTROPIUM BROMIDE 18 MCG: 18 CAPSULE ORAL; RESPIRATORY (INHALATION) at 08:22

## 2018-10-24 RX ADMIN — Medication 10 ML: at 13:48

## 2018-10-24 RX ADMIN — CLOPIDOGREL BISULFATE 75 MG: 75 TABLET ORAL at 08:55

## 2018-10-24 RX ADMIN — CEFTRIAXONE SODIUM 2 G: 2 INJECTION, POWDER, FOR SOLUTION INTRAMUSCULAR; INTRAVENOUS at 16:03

## 2018-10-24 RX ADMIN — Medication 10 ML: at 21:28

## 2018-10-24 RX ADMIN — ATORVASTATIN CALCIUM 40 MG: 40 TABLET, FILM COATED ORAL at 09:35

## 2018-10-24 RX ADMIN — HEPARIN SODIUM 2 UNITS/HR: 5000 INJECTION, SOLUTION INTRAVENOUS; SUBCUTANEOUS at 11:46

## 2018-10-24 RX ADMIN — FAMOTIDINE 40 MG: 20 TABLET ORAL at 18:08

## 2018-10-24 RX ADMIN — ASPIRIN 81 MG: 81 TABLET, COATED ORAL at 08:55

## 2018-10-24 RX ADMIN — FAMOTIDINE 40 MG: 20 TABLET ORAL at 08:55

## 2018-10-24 RX ADMIN — TICAGRELOR 180 MG: 90 TABLET ORAL at 12:18

## 2018-10-24 NOTE — PROGRESS NOTES
C-Diff testing has been ordered but the test has not been completed. It did not meet testing criteria because: The patient has not had 3 or more liquid bowel movements in the last 24 hours. And the stool was not liquid.

## 2018-10-24 NOTE — PROGRESS NOTES
Bedside and Verbal shift change report given to self (oncoming nurse) by Damian Olson (offgoing nurse). Report included the following information SBAR, Kardex and Med Rec Status.

## 2018-10-24 NOTE — PROGRESS NOTES
Pt admitted to 3rd floor tele for NSTEMI. CM met with pt to discuss CM needs & DCP. Pt is A&Ox4. Pt is indep at home with all ADLS. Pt lives with SO. Pt has O2 concentrator & portable from [employer], walker; no further DME needs. Pt has no difficulty with obtaining medications or transport. Pt is current with [facility]. Pt would like to resume [facility] services at dc. DCP home with SO & HH. CM to send referral & order on day of dc. CM to continue to monitor. Care Management Interventions PCP Verified by CM: Yes Last Visit to PCP: 10/05/18 Mode of Transport at Discharge: Other (see comment)(JOSEPH Lopez [phone]) Transition of Care Consult (CM Consult): Discharge Planning Discharge Durable Medical Equipment: No 
Physical Therapy Consult: No 
Occupational Therapy Consult: No 
Speech Therapy Consult: No 
Current Support Network: Own Home(Lives with SO) Confirm Follow Up Transport: Family Plan discussed with Pt/Family/Caregiver: Yes Freedom of Choice Offered: Yes Discharge Location Discharge Placement: Home with home health

## 2018-10-24 NOTE — PROGRESS NOTES
TRANSFER - OUT REPORT: 
 
[facility] Dr Mabel Moreno RFA Balloon LAD Versed 2 mg Angiomax start 1209, stop 1230 Brilinta 180 mg Art line attached to 6 fr sheath No bleeding/hematoma at site VSS Pt is a/o no complaints Verbal report given to Princess(name) on Raymond Leon  being transferred to CPRU(unit) for routine progression of care Report consisted of patients Situation, Background, Assessment and  
Recommendations(SBAR). Information from the following report(s) SBAR and Procedure Summary was reviewed with the receiving nurse. Lines:  
Peripheral IV 10/22/18 Right Antecubital (Active) Site Assessment Clean, dry, & intact 10/24/2018  7:40 AM  
Phlebitis Assessment 0 10/24/2018  7:40 AM  
Infiltration Assessment 0 10/24/2018  7:40 AM  
Dressing Status Clean, dry, & intact 10/24/2018  7:40 AM  
Dressing Type Tape;Transparent 10/24/2018  4:51 AM  
Hub Color/Line Status Infusing;Patent 10/24/2018  4:51 AM  
Alcohol Cap Used No 10/22/2018  3:00 PM  
   
Peripheral IV 10/23/18 Left;Posterior Wrist (Active) Site Assessment Clean, dry, & intact 10/24/2018  7:40 AM  
Phlebitis Assessment 0 10/24/2018  7:40 AM  
Infiltration Assessment 0 10/24/2018  7:40 AM  
Dressing Status Clean, dry, & intact 10/24/2018  7:40 AM  
Dressing Type Tape;Transparent 10/24/2018  4:51 AM  
Hub Color/Line Status Patent 10/24/2018  4:51 AM  
  
 
Opportunity for questions and clarification was provided.

## 2018-10-24 NOTE — PROGRESS NOTES
6FR arterial sheath removed from right groin using sterile technique. Manual pressure held over site for 20 minutes. Hemostasis achieved. Right groin without bleeding without hematoma. Sterile gauze placed over site and secured with tegaderm. 5lb sandbag placed over site. Patient instructed to keep right leg straight and still and head on pillow. Patient verbalizes understanding. Bed rest ends at 2135.

## 2018-10-24 NOTE — PROGRESS NOTES
Bedside and verbal shift change report given to Tan Alcantar RN (oncoming nurse) by self Bre ríos). Report included the following information SBAR, Kardex, Intake/Output and Recent Results. Heparin drip rate verified.

## 2018-10-24 NOTE — PROGRESS NOTES
TRANSFER - OUT REPORT: 
 
Verbal report given to Endy Huggins RN(name) on Frederic Kinsey  being returned to room 331(unit) for routine progression of care Report consisted of patients Situation, Background, Assessment and  
Recommendations(SBAR). Information from the following report(s) Procedure Summary was reviewed with the receiving nurse. Lines:  
Peripheral IV 10/22/18 Right Antecubital (Active) Site Assessment Clean, dry, & intact 10/24/2018  7:40 AM  
Phlebitis Assessment 0 10/24/2018  7:40 AM  
Infiltration Assessment 0 10/24/2018  7:40 AM  
Dressing Status Clean, dry, & intact 10/24/2018  7:40 AM  
Dressing Type Tape;Transparent 10/24/2018  4:51 AM  
Hub Color/Line Status Infusing;Patent 10/24/2018  4:51 AM  
Alcohol Cap Used No 10/22/2018  3:00 PM  
   
Peripheral IV 10/23/18 Left;Posterior Wrist (Active) Site Assessment Clean, dry, & intact 10/24/2018  7:40 AM  
Phlebitis Assessment 0 10/24/2018  7:40 AM  
Infiltration Assessment 0 10/24/2018  7:40 AM  
Dressing Status Clean, dry, & intact 10/24/2018  7:40 AM  
Dressing Type Tape;Transparent 10/24/2018  4:51 AM  
Hub Color/Line Status Patent 10/24/2018  4:51 AM  
  
 
Opportunity for questions and clarification was provided. Patient transported with: 
 Monitor Registered Nurse

## 2018-10-24 NOTE — PROGRESS NOTES
NSTEMI-  worse. Left heart catheterization with possible angioplasty and alternative therapies discussed.  Risks and benefits of the procedure including bleeding, arterial injury, infection, MI, stoke, death, emergent cabg, acute renal failure, contrast allergic reaction were discussed and questions answered. 
'

## 2018-10-24 NOTE — PROGRESS NOTES
Bedside and Verbal shift change report given to [employer] (oncoming nurse) by self (offgoing nurse). Report included the following information SBAR, Kardex and Med Rec Status.

## 2018-10-24 NOTE — PROGRESS NOTES
Hospitalist Progress Note   
10/24/2018 Admit Date: 10/22/2018  2:56 PM  
NAME: Himanshu Norris :  1945 DOS:              10/24/18 MRN:  [id number] Attending: Gerardo Menchaca MD 
PCP:  Breanna Moya MD 
Treatment Team: Attending Provider: Abilio Pompa MD; Care Manager: Shena Ramsey; Consulting Provider: Alda Jernigan MD; Utilization Review: Kris Washburn RN Full Code SUBJECTIVE: As previously documented: 69 yo CM with past history of MVCAD s/p 2vCABG and 4 stents in 2018, aortic stenosis s/p aortic valve replacement, COPD, HLD presents from [facility] due to elevated troponins and is admitted for coronary artery disease workup. Hospitalist consulted for diarrhea and leukocytosis. Wife reported patient had multiple course abxs for UTI and AMS. Also she reported patient had laxatives. Cardiology managing NSTEMI. [place] fever 100.8 10/23 night. Ucx with NGR, started on rocephin on 10/24. Patient went for [facility] on 10/24 s/p  ptca of acute stent thrombus of left main. 10/24/18    Himanshu Norris . stated is doing \"ok\" after heart carth. Patient denies dysuria and his \"usual cough\" is about the same. 10+ ROS reviewed and negative except for positive in HPI. Allergies Allergen Reactions  Advair Diskus [Fluticasone-Salmeterol] Unknown (comments)  
  rash  Prozac [Fluoxetine] Unable to Obtain Current Facility-Administered Medications Medication Dose Route Frequency  atropine 0.1 mg/mL injection  0.9% sodium chloride infusion  75 mL/hr IntraVENous CONTINUOUS  
 heparin (PF) 2 units/ml in NS infusion  2 Units/hr IntraVENous CONTINUOUS  
 lidocaine (XYLOCAINE) 10 mg/mL (1 %) injection 3-10 mL  3-10 mL IntraDERMal Multiple  midazolam (VERSED) injection 0.5-2 mg  0.5-2 mg IntraVENous Multiple  cefTRIAXone (ROCEPHIN) 2 g in 0.9% sodium chloride (MBP/ADV) 50 mL  2 g IntraVENous Q24H  [START ON 10/25/2018] ticagrelor (BRILINTA) tablet 90 mg  90 mg Oral Q12H  
 [START ON 10/25/2018] metoprolol succinate (TOPROL-XL) XL tablet 25 mg  25 mg Oral DAILY  bivalirudin (ANGIOMAX) 250 mg in 0.9% sodium chloride (MBP/ADV) 50 mL infusion  1.75 mg/kg/hr IntraVENous CONTINUOUS  
 aspirin delayed-release tablet 81 mg  81 mg Oral DAILY  atorvastatin (LIPITOR) tablet 40 mg  40 mg Oral DAILY  HYDROcodone-acetaminophen (NORCO) 7.5-325 mg per tablet 1 Tab  1 Tab Oral Q6H PRN  
 levothyroxine (SYNTHROID) tablet 25 mcg  25 mcg Oral ACB  lisinopril (PRINIVIL, ZESTRIL) tablet 2.5 mg  2.5 mg Oral DAILY  nitroglycerin (NITROSTAT) tablet 0.4 mg  0.4 mg SubLINGual Q5MIN PRN  
 tamsulosin (FLOMAX) capsule 0.4 mg  0.4 mg Oral DAILY  tiotropium (SPIRIVA) inhalation capsule 18 mcg  1 Cap Inhalation DAILY  topiramate (TOPAMAX) tablet 100 mg  100 mg Oral DAILY  venlafaxine-SR (EFFEXOR-XR) capsule 75 mg  75 mg Oral QHS  sodium chloride (NS) flush 5-10 mL  5-10 mL IntraVENous Q8H  
 sodium chloride (NS) flush 5-10 mL  5-10 mL IntraVENous PRN  
 morphine injection 2 mg  2 mg IntraVENous Q4H PRN  
 famotidine (PEPCID) tablet 40 mg  40 mg Oral BID Immunization History Administered Date(s) Administered  Influenza Vaccine (Quad) PF 09/26/2018 Objective:  
 
Patient Vitals for the past 24 hrs: 
 Temp Pulse Resp BP SpO2  
10/24/18 1343 97.8 °F (36.6 °C) 75  113/62 95 % 10/24/18 1315  73 19 103/62 97 % 10/24/18 1300  70 18 105/62 98 % 10/24/18 1250  75  110/64 97 % 10/24/18 1230  75 16 115/64 97 % 10/24/18 0853 97.4 °F (36.3 °C) 70 19 110/61 99 % 10/24/18 0822     98 % 10/24/18 0617  72  103/55   
10/24/18 0419 98 °F (36.7 °C) 67 20 (!) 89/53 98 % 10/24/18 0118  69  94/64   
10/24/18 0014 99.6 °F (37.6 °C) 73 20  97 % 10/23/18 2039 (!) 100.7 °F (38.2 °C) 82 20 99/64 99 % 10/23/18 1623 99.2 °F (37.3 °C) 77 18 91/55 97 % Temp (24hrs), Av.8 °F (37.1 °C), Min:97.4 °F (36.3 °C), Max:100.7 °F (38.2 °C) Oxygen Therapy O2 Sat (%): 95 % (10/24/18 1343) Pulse via Oximetry: 72 beats per minute (10/24/18 1315) O2 Device: Nasal cannula (10/24/18 1250) O2 Flow Rate (L/min): 2 l/min (10/24/18 1250) Oxygen Therapy O2 Sat (%): 95 % (10/24/18 1343) Pulse via Oximetry: 72 beats per minute (10/24/18 1315) O2 Device: Nasal cannula (10/24/18 1250) O2 Flow Rate (L/min): 2 l/min (10/24/18 1250) Physical Exam: 
General:         Alert, cooperative, no distress HEENT:               NCAT. No obvious deformity. Lungs: No wheezing/rhonchi/rales Cardiovascular:   RRR. No m/r/g. No pedal edema b/l. Abdomen:       S/nt/nd. Bowel sounds normal. R groin with clean dressing. Skin:         No rashes or lesions. Not Jaundiced Neurologic:    No gross focal deficit. Moves all extremities. Psychiatric:         Good mood. Normal affect. DIAGNOSTIC STUDIES Data Review:  
Recent Results (from the past 24 hour(s)) PTT Collection Time: 10/23/18  7:44 PM  
Result Value Ref Range aPTT 109.1 (HH) 23.2 - 35.3 SEC METABOLIC PANEL, BASIC Collection Time: 10/24/18  3:46 AM  
Result Value Ref Range Sodium 139 136 - 145 mmol/L Potassium 4.0 3.5 - 5.1 mmol/L Chloride 111 (H) 98 - 107 mmol/L  
 CO2 19 (L) 21 - 32 mmol/L Anion gap 9 7 - 16 mmol/L Glucose 87 65 - 100 mg/dL BUN 12 8 - 23 MG/DL Creatinine 0.89 0.8 - 1.5 MG/DL  
 GFR est AA >60 >60 ml/min/1.73m2 GFR est non-AA >60 >60 ml/min/1.73m2 Calcium 7.9 (L) 8.3 - 10.4 MG/DL MAGNESIUM Collection Time: 10/24/18  3:46 AM  
Result Value Ref Range Magnesium 2.2 1.8 - 2.4 mg/dL PTT Collection Time: 10/24/18  3:46 AM  
Result Value Ref Range aPTT 90.0 (H) 23.2 - 35.3 SEC All Micro Results Procedure Component Value Units Date/Time CULTURE, URINE [[id number]]  (Abnormal) Collected:  10/22/18 8442 Order Status:  Completed Specimen:  Urine from Clean catch Updated:  10/24/18 0712 Special Requests: NO SPECIAL REQUESTS Culture result:    
  10,000 to 50,000 COLONIES/mL GRAM NEGATIVE RODS SUBCULTURE IN PROGRESS  
     
      
  <10,000 COLONIES/mL NORMAL SKIN CHERIE ISOLATED 
  
 CULTURE, BLOOD [[id number]] Collected:  10/22/18 170 Order Status:  Completed Specimen:  Blood Updated:  10/24/18 6242 Special Requests: --     
  LEFT Antecubital 
  
  Culture result: NO GROWTH 2 DAYS     
 CULTURE, BLOOD [[id number]] Collected:  10/22/18 170 Order Status:  Completed Specimen:  Blood Updated:  10/24/18 4132 Special Requests: --     
  RIGHT 
HAND Culture result: NO GROWTH 2 DAYS     
 CULTURE, STOOL [[id number]] Order Status:  Sent Specimen:  Stool C. DIFFICILE AG & TOXIN A/B [[id number]] Order Status:  Canceled Specimen:  Stool Imaging Erendira Oven /Studies: CXR Results  (Last 48 hours) None CT Results  (Last 48 hours) None No results found. Results for orders placed or performed during the hospital encounter of 10/22/18  
2D ECHO COMPLETE ADULT (TTE) W OR WO CONTR Narrative [address] Yadav, [address] [facility] 
[phone] Transthoracic Echocardiogram 
2D, M-mode, Doppler, and Color Doppler Patient: Blanca Santos 
MR #: [id number] : [id number] Age: 68 years Gender: Male Study date: 23-Oct-2018 Account #: [de-identified] Height: 68 in 
Weight: 188.5 lb 
BSA: 1.99 mï¾² Status:Routine Location: 331 BP: 91/ 46 Allergies: FLUTICASONE-SALMETEROL, FLUOXETINE Sonographer:  Radames Osler, RDCS Group:  [facility] Referring Physician:  Vernie Sou. Donnie Lesch, [address] Reading Physician:  Dwain Niño MD [facility] INDICATIONS: Chest Pain HISTORY: PRIOR PROCEDURES: Mechanical prosthesis of aortic valve. PROCEDURE: This was a routine study. A transthoracic echocardiogram was 
performed. The study included complete 2D imaging, M-mode, complete spectral 
Doppler, and color Doppler. Intravenous contrast (Definity) was administered. Image quality was adequate. LEFT VENTRICLE: Size was normal. Systolic function was normal. Ejection 
fraction was estimated in the range of 55 % to 60 %. There were no regional 
wall motion abnormalities. Wall thickness was mildly increased. Left 
ventricular diastolic function parameters were normal. Average E/e'~ 13. RIGHT VENTRICLE: The size was normal. Systolic function was normal. The 
tricuspid jet envelope definition was inadequate for estimation of RV  
systolic 
pressure. LEFT ATRIUM: Size was at the upper limits of normal. 
 
RIGHT ATRIUM: Size was normal. 
 
SYSTEMIC VEINS: IVC: The inferior vena cava was normal in size and course. AORTIC VALVE: A [employer] mechanical prosthesis was present (1996). The peak 
velocity was 3.08 m/s. The mean pressure gradient was 22 mmHg. The peak 
pressure gradient was 38 mmHg. ACT~ 103ms, DI~40, SVi~ 0.36. There was no 
insufficiency. MITRAL VALVE: Valve structure was normal. There was no evidence for stenosis. There was mild regurgitation. TRICUSPID VALVE: The valve structure was normal. There was no evidence for 
stenosis. There was trivial regurgitation. PULMONIC VALVE: Not well visualized. There was no evidence for stenosis. There 
was no insufficiency. PERICARDIUM: There was no pericardial effusion. AORTA: The root exhibited normal size. There was dilatation of the ascending 
aorta measuring 4.55cm. SUMMARY: 
 
-  Left ventricle: Systolic function was normal. Ejection fraction was 
estimated in the range of 55 % to 60 %. There were no regional wall motion 
abnormalities. Wall thickness was mildly increased. -  Aortic valve: A [employer] mechanical prosthesis was present (1996). -  Mitral valve: There was mild regurgitation. 
 
-  Aorta, systemic arteries: There was dilatation of the ascending aorta 
measuring 4.55cm. SYSTEM MEASUREMENT TABLES 
 
2D mode AoR Diam (2D): 3.2 cm 
LA Dimension (2D): 3.4 cm Left Atrium Systolic Volume Index; Method of Disks, Biplane; 2D mode;: 33.5 
ml/m2 IVS/LVPW (2D): 1.1 IVSd (2D): 1.3 cm LVIDd (2D): 5 cm LVIDs (2D): 3 cm 
LVOT Area (2D): 3.5 cm2 LVPWd (2D): 1.2 cm Tissue Doppler Imaging LV Peak Early Echeverria Tissue Que; Mean; Lateral MA (TDI): 10 cm/s LV Peak Early Echeverria Tissue Que; Medial MA (TDI): 6.9 cm/s Unspecified Scan Mode Peak Grad; Mean; Antegrade Flow: 34 mm[Hg] Vmax; Antegrade Flow: 285 cm/s LVOT Diam: 2.1 cm 
MV Peak Que/LV Peak Tissue Que E-Wave; Lateral MA: 10.4 MV Peak Que/LV Peak Tissue Que E-Wave; Medial MA: 15.1 Prepared and signed by 
 
Jacob Cope MD [facility] [place] Signed 23-Oct-2018 12:43:51 Labs and Studies from previous 24 hours have been personally reviewed by myself   
ASSESSMENT Active Hospital Problems Diagnosis Date Noted  Weakness 10/22/2018  Leukocytosis 10/22/2018  
 NSTEMI (non-ST elevated myocardial infarction) ([facility] [address] 09/19/2018  S/P CABG x 2 09/19/2018  S/P AVR (aortic valve replacement) 05/25/2016  Coronary atherosclerosis of native coronary artery 10/22/2015  Mixed hyperlipidemia 10/22/2015 Hospital Problems as of 10/24/2018 Date Reviewed: 10/3/2018 Codes Class Noted - Resolved POA Weakness ICD-10-CM: R53.1 ICD-9-CM: 780.79  10/22/2018 - Present Yes Leukocytosis ICD-10-CM: L95.084 ICD-9-CM: 288.60  10/22/2018 - Present Unknown S/P CABG x 2 (Chronic) ICD-10-CM: Z95.1 ICD-9-CM: V45.81  9/19/2018 - Present Yes * (Principal) [facility] ICD-10-CM: I21.4 ICD-9-CM: 410.70  9/19/2018 - Present Unknown S/P AVR (aortic valve replacement) (Chronic) ICD-10-CM: Z95.2 ICD-9-CM: V43.3  5/25/2016 - Present Yes Aortic stenosis ICD-10-CM: I35.0 ICD-9-CM: 424.1  10/22/2015 - Present Coronary atherosclerosis of native coronary artery (Chronic) ICD-10-CM: I25.10 ICD-9-CM: 414.01  10/22/2015 - Present Yes Mixed hyperlipidemia (Chronic) ICD-10-CM: D34.8 ICD-9-CM: 272.2  10/22/2015 - Present Yes A/P: 
 
-NSTEMI 
S/p ptca of acute stent thrombus of left main. 10/24 Management as per Cardiology team 
 
-Fever 
-suspected UTI Last BM was last night. Unable to get stool sample patient not having diarrhea Patient spiked fever 100.8 last night Ucx with GNRs pending identification Blood cultures NG so far Start ceftriaxone for UTI Get CXR to rule out pneumonia Check routine labs daily 
 
-Aortic stenosis Management as per cardiology team 
 
-Hypokalemia Resolved 
 
-COPD No active wheezing Cont home inhalers Will continue to follow Sara Ernandez MD 
10/24/18

## 2018-10-24 NOTE — PROGRESS NOTES
TRANSFER - IN REPORT: 
 
Verbal report received from anand(name) on Antonio Womack  being received from cath lab(unit) for routine progression of care Report consisted of patients Situation, Background, Assessment and  
Recommendations(SBAR). Information from the following report(s) Procedure Summary was reviewed with the receiving nurse. Opportunity for questions and clarification was provided. Assessment completed upon patients arrival to unit and care assumed.

## 2018-10-24 NOTE — PROGRESS NOTES
Problem: Pressure Injury - Risk of 
Goal: *Prevention of pressure injury Document Danny Scale and appropriate interventions in the flowsheet. Outcome: Progressing Towards Goal 
Pressure Injury Interventions: 
Sensory Interventions: Pressure redistribution bed/mattress (bed type), Keep linens dry and wrinkle-free, Minimize linen layers Moisture Interventions: Absorbent underpads Activity Interventions: Increase time out of bed Mobility Interventions: Pressure redistribution bed/mattress (bed type) Nutrition Interventions: Document food/fluid/supplement intake Problem: Falls - Risk of 
Goal: *Absence of Falls Document Genevive Camera Fall Risk and appropriate interventions in the flowsheet. Outcome: Progressing Towards Goal 
Fall Risk Interventions: 
Mobility Interventions: Patient to call before getting OOB Medication Interventions: Patient to call before getting OOB Elimination Interventions: Patient to call for help with toileting needs

## 2018-10-24 NOTE — PROGRESS NOTES
Report received from Diandra Kim for continue of care. Patient alert with no complaints. 6FR sheath to right femoral artery intact with no bleeding or hematoma

## 2018-10-24 NOTE — OP NOTES
Cardiac Catheterization Procedure Note    Patient ID:     Name: Iesha Zapien Record Number: [id number]   YOB: 1945    Date of Procedure: 10/24/2018     Pre-procedure Diagnosis:  NSTEMI    Post-procedure Diagnosis: Coronary Artery Disease    Reason for Procedure: ACS < or = 24 Hours    Blood loss less than 5 ml    Sedation. Pt received 2 mg versed and 0 mcg fentanyl for monitored conscious sedation from 11:30 to 12:30. Nurse amanda    Specimen: None    No complications    No assistants    Time out, Mallampati, and ASA performed    Procedure:  After informed consent, patient was prepped and draped in the usual sterile fashion. The right groin was infiltrated with lidocaine. The right femoral artery was accessed via the modified Seldinger technique with a 6 [place] sheath. 150cc Visipaque contrast were utilized for the entire procedure. no closure device used    Catheters/wires/stents. AL1 JR4 XB4 GUIDE PROWATER 3X20 NC BALLOON    FINDINGS    Left Ventricle: not done  LVEDP: not done    Left Main:moderate sized with distal thrombus 80% TIMI2 flow into circ    Left Anterior descending coronary artery: occluded off left main    diagonals patent off grafted LAD moderate sized and no disease    Left Circumflex coronary artery: patent but stented. LM into circ thrombus   OMs 2 om vessels patent medium sized   Ramus not present    Right coronary artery: occluded mid vessel after rv branch   RV branch patent after 99% rca stenosis   FILIPPO/PDA grafted and patent      Graft anatomy:   SV to lad patent. Previously stented. No sig disease    SV to rca patent with good runoff    Intervention if done: Q4 guide and angiomax used for PTCA to thrombus of left main stent. Multiple inflations to 24 AAKASH with resolution of left main thrombus.  HERNANDO 3 flow    Conclusions: ptca of acute stent thrombus of left main    Recommentations: dapt with asa and brilinta    No complications      Signed By: Summer Pineda, MD

## 2018-10-24 NOTE — PROCEDURES
Cardiac Catheterization Procedure Note    Patient ID:     Name: Iesha Zapien Record Number: [id number]   YOB: 1945    Date of Procedure: 10/24/2018     Pre-procedure Diagnosis:  NSTEMI    Post-procedure Diagnosis: Coronary Artery Disease    Reason for Procedure: ACS < or = 24 Hours    Blood loss less than 5 ml    Sedation. Pt received 2 mg versed and 0 mcg fentanyl for monitored conscious sedation from 11:30 to 12:30. Nurse amanda    Specimen: None    No complications    No assistants    Time out, Mallampati, and ASA performed    Procedure:  After informed consent, patient was prepped and draped in the usual sterile fashion. The right groin was infiltrated with lidocaine. The right femoral artery was accessed via the modified Seldinger technique with a 6 [place] sheath. 150cc Visipaque contrast were utilized for the entire procedure. no closure device used    Catheters/wires/stents. AL1 JR4 XB4 GUIDE PROWATER 3X20 NC BALLOON    FINDINGS    Left Ventricle: not done  LVEDP: not done    Left Main:moderate sized with distal thrombus 80% TIMI2 flow into circ    Left Anterior descending coronary artery: occluded off left main    diagonals patent off grafted LAD moderate sized and no disease    Left Circumflex coronary artery: patent but stented. LM into circ thrombus   OMs 2 om vessels patent medium sized   Ramus not present    Right coronary artery: occluded mid vessel after rv branch   RV branch patent after 99% rca stenosis   FILIPPO/PDA grafted and patent      Graft anatomy:   SV to lad patent. Previously stented. No sig disease    SV to rca patent with good runoff    Intervention if done: Q4 guide and angiomax used for PTCA to thrombus of left main stent. Multiple inflations to 24 AAKASH with resolution of left main thrombus.  HERNANDO 3 flow    Conclusions: ptca of acute stent thrombus of left main    Recommentations: dapt with asa and brilinta    No complications      Signed By: Alissa Sloan MD

## 2018-10-25 LAB
ANION GAP SERPL CALC-SCNC: 11 MMOL/L (ref 7–16)
BACTERIA SPEC CULT: NORMAL
BASOPHILS # BLD: 0.1 K/UL (ref 0–0.2)
BASOPHILS NFR BLD: 1 % (ref 0–2)
BUN SERPL-MCNC: 10 MG/DL (ref 8–23)
CALCIUM SERPL-MCNC: 8 MG/DL (ref 8.3–10.4)
CHLORIDE SERPL-SCNC: 110 MMOL/L (ref 98–107)
CO2 SERPL-SCNC: 18 MMOL/L (ref 21–32)
CREAT SERPL-MCNC: 0.86 MG/DL (ref 0.8–1.5)
DIFFERENTIAL METHOD BLD: ABNORMAL
EOSINOPHIL # BLD: 0.1 K/UL (ref 0–0.8)
EOSINOPHIL NFR BLD: 1 % (ref 0.5–7.8)
ERYTHROCYTE [DISTWIDTH] IN BLOOD BY AUTOMATED COUNT: 14.4 %
GLUCOSE SERPL-MCNC: 84 MG/DL (ref 65–100)
HCT VFR BLD AUTO: 33.4 % (ref 41.1–50.3)
HGB BLD-MCNC: 11 G/DL (ref 13.6–17.2)
IMM GRANULOCYTES # BLD: 0.3 K/UL (ref 0–0.5)
IMM GRANULOCYTES NFR BLD AUTO: 3 % (ref 0–5)
LYMPHOCYTES # BLD: 1.2 K/UL (ref 0.5–4.6)
LYMPHOCYTES NFR BLD: 12 % (ref 13–44)
MCH RBC QN AUTO: 31 PG (ref 26.1–32.9)
MCHC RBC AUTO-ENTMCNC: 32.9 G/DL (ref 31.4–35)
MCV RBC AUTO: 94.1 FL (ref 79.6–97.8)
MONOCYTES # BLD: 1.2 K/UL (ref 0.1–1.3)
MONOCYTES NFR BLD: 13 % (ref 4–12)
NEUTS SEG # BLD: 6.6 K/UL (ref 1.7–8.2)
NEUTS SEG NFR BLD: 70 % (ref 43–78)
NRBC # BLD: 0 K/UL (ref 0–0.2)
PLATELET # BLD AUTO: 309 K/UL (ref 150–450)
PMV BLD AUTO: 9.5 FL (ref 9.4–12.3)
POTASSIUM SERPL-SCNC: 3.8 MMOL/L (ref 3.5–5.1)
RBC # BLD AUTO: 3.55 M/UL (ref 4.23–5.6)
SERVICE CMNT-IMP: NORMAL
SODIUM SERPL-SCNC: 139 MMOL/L (ref 136–145)
WBC # BLD AUTO: 9.5 K/UL (ref 4.3–11.1)

## 2018-10-25 PROCEDURE — 97530 THERAPEUTIC ACTIVITIES: CPT

## 2018-10-25 PROCEDURE — 74011250637 HC RX REV CODE- 250/637: Performed by: NURSE PRACTITIONER

## 2018-10-25 PROCEDURE — 74011000258 HC RX REV CODE- 258: Performed by: INTERNAL MEDICINE

## 2018-10-25 PROCEDURE — 74011250637 HC RX REV CODE- 250/637: Performed by: INTERNAL MEDICINE

## 2018-10-25 PROCEDURE — 80048 BASIC METABOLIC PNL TOTAL CA: CPT

## 2018-10-25 PROCEDURE — 97161 PT EVAL LOW COMPLEX 20 MIN: CPT

## 2018-10-25 PROCEDURE — 36415 COLL VENOUS BLD VENIPUNCTURE: CPT

## 2018-10-25 PROCEDURE — 74011250636 HC RX REV CODE- 250/636: Performed by: INTERNAL MEDICINE

## 2018-10-25 PROCEDURE — 85025 COMPLETE CBC W/AUTO DIFF WBC: CPT

## 2018-10-25 PROCEDURE — 65660000000 HC RM CCU STEPDOWN

## 2018-10-25 RX ORDER — CEPHALEXIN 250 MG/1
250 CAPSULE ORAL EVERY 6 HOURS
Status: DISCONTINUED | OUTPATIENT
Start: 2018-10-26 | End: 2018-10-26 | Stop reason: HOSPADM

## 2018-10-25 RX ADMIN — LEVOTHYROXINE SODIUM 25 MCG: 50 TABLET ORAL at 05:33

## 2018-10-25 RX ADMIN — FAMOTIDINE 40 MG: 20 TABLET ORAL at 08:14

## 2018-10-25 RX ADMIN — LISINOPRIL 2.5 MG: 5 TABLET ORAL at 08:14

## 2018-10-25 RX ADMIN — CEFTRIAXONE SODIUM 2 G: 2 INJECTION, POWDER, FOR SOLUTION INTRAMUSCULAR; INTRAVENOUS at 13:25

## 2018-10-25 RX ADMIN — Medication 10 ML: at 13:32

## 2018-10-25 RX ADMIN — FAMOTIDINE 40 MG: 20 TABLET ORAL at 17:05

## 2018-10-25 RX ADMIN — HYDROCODONE BITARTRATE AND ACETAMINOPHEN 1 TABLET: 7.5; 325 TABLET ORAL at 22:48

## 2018-10-25 RX ADMIN — ATORVASTATIN CALCIUM 40 MG: 40 TABLET, FILM COATED ORAL at 09:25

## 2018-10-25 RX ADMIN — Medication 10 ML: at 22:43

## 2018-10-25 RX ADMIN — TOPIRAMATE 100 MG: 25 TABLET, FILM COATED ORAL at 08:14

## 2018-10-25 RX ADMIN — TICAGRELOR 90 MG: 90 TABLET ORAL at 08:14

## 2018-10-25 RX ADMIN — TICAGRELOR 90 MG: 90 TABLET ORAL at 22:44

## 2018-10-25 RX ADMIN — METOPROLOL SUCCINATE 25 MG: 25 TABLET, EXTENDED RELEASE ORAL at 08:14

## 2018-10-25 RX ADMIN — VENLAFAXINE HYDROCHLORIDE 75 MG: 75 CAPSULE, EXTENDED RELEASE ORAL at 22:44

## 2018-10-25 RX ADMIN — ASPIRIN 81 MG: 81 TABLET, COATED ORAL at 08:13

## 2018-10-25 RX ADMIN — Medication 10 ML: at 05:33

## 2018-10-25 RX ADMIN — TAMSULOSIN HYDROCHLORIDE 0.4 MG: 0.4 CAPSULE ORAL at 08:13

## 2018-10-25 NOTE — PROGRESS NOTES
Bedside and Verbal shift change report given to self (oncoming nurse) by Arlette Disla RN (offgoing nurse). Report included the following information SBAR, Kardex, MAR and Recent Results. Assumed care of patient.

## 2018-10-25 NOTE — PROGRESS NOTES
Hospitalist Progress Note   
10/25/2018 Admit Date: 10/22/2018  2:56 PM  
NAME: Raymond Leon :  1945 DOS:              10/25/18 MRN:  [id number] Attending: Loida Lopes MD 
PCP:  Gustavo Waite MD 
Treatment Team: Attending Provider: Sylwia Enciso MD; Care Manager: Jyotsna Cifuentes; Consulting Provider: Elva Verde MD; Utilization Review: Joan Lou RN Full Code SUBJECTIVE: As previously documented: 67 yo CM with past history of MVCAD s/p 2vCABG and 4 stents in 2018, aortic stenosis s/p aortic valve replacement, COPD, HLD presents from [facility] due to elevated troponins and is admitted for coronary artery disease workup. Hospitalist consulted for diarrhea and leukocytosis. Wife reported patient had multiple course abxs for UTI and AMS. Also she reported patient had laxatives. Cardiology managing NSTEMI. [place] fever 100.8 10/23 night. Ucx with NGR, started on rocephin on 10/24. Patient went for [facility] on 10/24 s/p  ptca of acute stent thrombus of left main. 10/25/18    Raymond Leon . stated feeling better today, he stated is not having good appetite today. Patient denies dysuria, cough or SOB. Family at bedside all questions answered. 10+ ROS reviewed and negative except for positive in HPI. Allergies Allergen Reactions  Advair Diskus [Fluticasone-Salmeterol] Unknown (comments)  
  rash  Prozac [Fluoxetine] Unable to Obtain Current Facility-Administered Medications Medication Dose Route Frequency  0.9% sodium chloride infusion  75 mL/hr IntraVENous CONTINUOUS  
 heparin (PF) 2 units/ml in NS infusion  2 Units/hr IntraVENous CONTINUOUS  
 lidocaine (XYLOCAINE) 10 mg/mL (1 %) injection 3-10 mL  3-10 mL IntraDERMal Multiple  midazolam (VERSED) injection 0.5-2 mg  0.5-2 mg IntraVENous Multiple  cefTRIAXone (ROCEPHIN) 2 g in 0.9% sodium chloride (MBP/ADV) 50 mL  2 g IntraVENous Q24H  ticagrelor (BRILINTA) tablet 90 mg  90 mg Oral Q12H  
 metoprolol succinate (TOPROL-XL) XL tablet 25 mg  25 mg Oral DAILY  bivalirudin (ANGIOMAX) 250 mg in 0.9% sodium chloride (MBP/ADV) 50 mL infusion  1.75 mg/kg/hr IntraVENous CONTINUOUS  
 aspirin delayed-release tablet 81 mg  81 mg Oral DAILY  atorvastatin (LIPITOR) tablet 40 mg  40 mg Oral DAILY  HYDROcodone-acetaminophen (NORCO) 7.5-325 mg per tablet 1 Tab  1 Tab Oral Q6H PRN  
 levothyroxine (SYNTHROID) tablet 25 mcg  25 mcg Oral ACB  lisinopril (PRINIVIL, ZESTRIL) tablet 2.5 mg  2.5 mg Oral DAILY  nitroglycerin (NITROSTAT) tablet 0.4 mg  0.4 mg SubLINGual Q5MIN PRN  
 tamsulosin (FLOMAX) capsule 0.4 mg  0.4 mg Oral DAILY  tiotropium (SPIRIVA) inhalation capsule 18 mcg  1 Cap Inhalation DAILY  topiramate (TOPAMAX) tablet 100 mg  100 mg Oral DAILY  venlafaxine-SR (EFFEXOR-XR) capsule 75 mg  75 mg Oral QHS  sodium chloride (NS) flush 5-10 mL  5-10 mL IntraVENous Q8H  
 sodium chloride (NS) flush 5-10 mL  5-10 mL IntraVENous PRN  
 morphine injection 2 mg  2 mg IntraVENous Q4H PRN  
 famotidine (PEPCID) tablet 40 mg  40 mg Oral BID Immunization History Administered Date(s) Administered  Influenza Vaccine (Quad) PF 2018 Objective:  
 
Patient Vitals for the past 24 hrs: 
 Temp Pulse Resp BP SpO2  
10/25/18 1334 97.4 °F (36.3 °C) 80 16 101/59 98 % 10/25/18 0813 97.5 °F (36.4 °C) 74 16 111/56 97 % 10/25/18 0519 96.9 °F (36.1 °C) 69 18 114/55 96 % 10/25/18 0039 97.1 °F (36.2 °C) 77 18 90/55 96 % 10/24/18 2044 97.7 °F (36.5 °C) 84 19 106/78 99 % 10/24/18 1800 98.7 °F (37.1 °C) 80 20 115/62 100 % Temp (24hrs), Av.6 °F (36.4 °C), Min:96.9 °F (36.1 °C), Max:98.7 °F (37.1 °C) Oxygen Therapy O2 Sat (%): 98 % (10/25/18 1334) Pulse via Oximetry: 72 beats per minute (10/24/18 1315) O2 Device: Nasal cannula (10/25/18 1510) O2 Flow Rate (L/min): 2 l/min (10/25/18 1510) Oxygen Therapy O2 Sat (%): 98 % (10/25/18 1334) Pulse via Oximetry: 72 beats per minute (10/24/18 1315) O2 Device: Nasal cannula (10/25/18 1510) O2 Flow Rate (L/min): 2 l/min (10/25/18 1510) Physical Exam: 
General:         Alert, cooperative, no distress HEENT:               NCAT. No obvious deformity. Lungs: No wheezing/rhonchi/rales Cardiovascular:   RRR. No m/r/g. No pedal edema b/l. Abdomen:       S/nt/nd. Bowel sounds normal.  
Skin:         No rashes or lesions. Not Jaundiced Neurologic:    No gross focal deficit. Moves all extremities. Psychiatric:         Good mood. Normal affect. DIAGNOSTIC STUDIES Data Review:  
Recent Results (from the past 24 hour(s)) CBC WITH AUTOMATED DIFF Collection Time: 10/24/18  8:55 PM  
Result Value Ref Range WBC 10.5 4.3 - 11.1 K/uL  
 RBC 3.70 (L) 4.23 - 5.6 M/uL  
 HGB 11.2 (L) 13.6 - 17.2 g/dL HCT 34.7 (L) 41.1 - 50.3 % MCV 93.8 79.6 - 97.8 FL  
 MCH 30.3 26.1 - 32.9 PG  
 MCHC 32.3 31.4 - 35.0 g/dL  
 RDW 14.2 % PLATELET 768 046 - 891 K/uL MPV 9.6 9.4 - 12.3 FL ABSOLUTE NRBC 0.00 0.0 - 0.2 K/uL  
 DF AUTOMATED NEUTROPHILS 75 43 - 78 % LYMPHOCYTES 11 (L) 13 - 44 % MONOCYTES 11 4.0 - 12.0 % EOSINOPHILS 1 0.5 - 7.8 % BASOPHILS 1 0.0 - 2.0 % IMMATURE GRANULOCYTES 2 0.0 - 5.0 %  
 ABS. NEUTROPHILS 7.8 1.7 - 8.2 K/UL  
 ABS. LYMPHOCYTES 1.2 0.5 - 4.6 K/UL  
 ABS. MONOCYTES 1.1 0.1 - 1.3 K/UL  
 ABS. EOSINOPHILS 0.1 0.0 - 0.8 K/UL  
 ABS. BASOPHILS 0.1 0.0 - 0.2 K/UL  
 ABS. IMM. GRANS. 0.2 0.0 - 0.5 K/UL  
CBC WITH AUTOMATED DIFF Collection Time: 10/25/18  3:57 AM  
Result Value Ref Range WBC 9.5 4.3 - 11.1 K/uL  
 RBC 3.55 (L) 4.23 - 5.6 M/uL  
 HGB 11.0 (L) 13.6 - 17.2 g/dL HCT 33.4 (L) 41.1 - 50.3 % MCV 94.1 79.6 - 97.8 FL  
 MCH 31.0 26.1 - 32.9 PG  
 MCHC 32.9 31.4 - 35.0 g/dL  
 RDW 14.4 % PLATELET 985 665 - 798 K/uL  MPV 9.5 9.4 - 12.3 FL  
 ABSOLUTE NRBC 0.00 0.0 - 0.2 K/uL  
 DF AUTOMATED NEUTROPHILS 70 43 - 78 % LYMPHOCYTES 12 (L) 13 - 44 % MONOCYTES 13 (H) 4.0 - 12.0 % EOSINOPHILS 1 0.5 - 7.8 % BASOPHILS 1 0.0 - 2.0 % IMMATURE GRANULOCYTES 3 0.0 - 5.0 %  
 ABS. NEUTROPHILS 6.6 1.7 - 8.2 K/UL  
 ABS. LYMPHOCYTES 1.2 0.5 - 4.6 K/UL  
 ABS. MONOCYTES 1.2 0.1 - 1.3 K/UL  
 ABS. EOSINOPHILS 0.1 0.0 - 0.8 K/UL  
 ABS. BASOPHILS 0.1 0.0 - 0.2 K/UL  
 ABS. IMM. GRANS. 0.3 0.0 - 0.5 K/UL METABOLIC PANEL, BASIC Collection Time: 10/25/18  3:57 AM  
Result Value Ref Range Sodium 139 136 - 145 mmol/L Potassium 3.8 3.5 - 5.1 mmol/L Chloride 110 (H) 98 - 107 mmol/L  
 CO2 18 (L) 21 - 32 mmol/L Anion gap 11 7 - 16 mmol/L Glucose 84 65 - 100 mg/dL BUN 10 8 - 23 MG/DL Creatinine 0.86 0.8 - 1.5 MG/DL  
 GFR est AA >60 >60 ml/min/1.73m2 GFR est non-AA >60 >60 ml/min/1.73m2 Calcium 8.0 (L) 8.3 - 10.4 MG/DL All Micro Results Procedure Component Value Units Date/Time CULTURE, URINE [[id number]] Collected:  10/22/18 1729 Order Status:  Completed Specimen:  Urine from Clean catch Updated:  10/25/18 9999 Special Requests: NO SPECIAL REQUESTS Culture result:    
  10,000 to 50,000 COLONIES/mL MIXED SKIN CHERIE ISOLATED  
     
 CULTURE, BLOOD [[id number]] Collected:  10/22/18 1708 Order Status:  Completed Specimen:  Blood Updated:  10/25/18 0732 Special Requests: --     
  LEFT Antecubital 
  
  Culture result: NO GROWTH 3 DAYS     
 CULTURE, BLOOD [[id number]] Collected:  10/22/18 1709 Order Status:  Completed Specimen:  Blood Updated:  10/25/18 0732 Special Requests: --     
  RIGHT 
HAND Culture result: NO GROWTH 3 DAYS     
 CULTURE, STOOL [[id number]] Collected:  10/22/18 1915 Order Status:  Canceled Specimen:  Stool C. DIFFICILE AG & TOXIN A/B [[id number]] Order Status:  Canceled Specimen:  Stool Imaging Ellison Luciano /Studies: CXR Results  (Last 48 hours) 10/24/18 1619  XR CHEST SNGL V Final result Impression:  IMPRESSION: No changes as above, cardiomegaly, status post OHS, clear lung  
fields, left shoulder arthroplasty Narrative:  Portable chest x-ray 2018 INDICATION: Fever COMPARISON: 2018 There are no interval changes. Heart is enlarged. Lung fields are clear. Valvular prosthesis, midline sternotomy sutures, and left shoulder arthroplasty  
is again noted. CT Results  (Last 48 hours) None No results found. Results for orders placed or performed during the hospital encounter of 10/22/18  
2D ECHO COMPLETE ADULT (TTE) W OR WO CONTR Narrative Calebaleshia One [address] [place], [address] [facility] 
[phone] Transthoracic Echocardiogram 
2D, M-mode, Doppler, and Color Doppler Patient: Sylvie Milton 
MR #: [id number] : [id number] Age: 68 years Gender: Male Study date: 23-Oct-2018 Account #: [de-identified] Height: 68 in 
Weight: 188.5 lb 
BSA: 1.99 mï¾² Status:Routine Location: 331 BP: 91/ 46 Allergies: FLUTICASONE-SALMETEROL, FLUOXETINE Sonographer:  Dipak Hwang RD Group:  [facility] Referring Physician:  [facility] Reading Physician:  Clara Yap MD [facility] INDICATIONS: Chest Pain HISTORY: PRIOR PROCEDURES: Mechanical prosthesis of aortic valve. PROCEDURE: This was a routine study. A transthoracic echocardiogram was 
performed. The study included complete 2D imaging, M-mode, complete spectral 
Doppler, and color Doppler. Intravenous contrast (Definity) was administered. Image quality was adequate. LEFT VENTRICLE: Size was normal. Systolic function was normal. Ejection 
fraction was estimated in the range of 55 % to 60 %. There were no regional 
wall motion abnormalities. Wall thickness was mildly increased. Left ventricular diastolic function parameters were normal. Average E/e'~ 13. RIGHT VENTRICLE: The size was normal. Systolic function was normal. The 
tricuspid jet envelope definition was inadequate for estimation of RV  
systolic 
pressure. LEFT ATRIUM: Size was at the upper limits of normal. 
 
RIGHT ATRIUM: Size was normal. 
 
SYSTEMIC VEINS: IVC: The inferior vena cava was normal in size and course. AORTIC VALVE: A [employer] mechanical prosthesis was present (1996). The peak 
velocity was 3.08 m/s. The mean pressure gradient was 22 mmHg. The peak 
pressure gradient was 38 mmHg. ACT~ 103ms, DI~40, SVi~ 0.36. There was no 
insufficiency. MITRAL VALVE: Valve structure was normal. There was no evidence for stenosis. There was mild regurgitation. TRICUSPID VALVE: The valve structure was normal. There was no evidence for 
stenosis. There was trivial regurgitation. PULMONIC VALVE: Not well visualized. There was no evidence for stenosis. There 
was no insufficiency. PERICARDIUM: There was no pericardial effusion. AORTA: The root exhibited normal size. There was dilatation of the ascending 
aorta measuring 4.55cm. SUMMARY: 
 
-  Left ventricle: Systolic function was normal. Ejection fraction was 
estimated in the range of 55 % to 60 %. There were no regional wall motion 
abnormalities. Wall thickness was mildly increased. -  Aortic valve: A [employer] mechanical prosthesis was present (1996). -  Mitral valve: There was mild regurgitation. 
 
-  Aorta, systemic arteries: There was dilatation of the ascending aorta 
measuring 4.55cm. SYSTEM MEASUREMENT TABLES 
 
2D mode AoR Diam (2D): 3.2 cm 
LA Dimension (2D): 3.4 cm Left Atrium Systolic Volume Index; Method of Disks, Biplane; 2D mode;: 33.5 
ml/m2 IVS/LVPW (2D): 1.1 IVSd (2D): 1.3 cm LVIDd (2D): 5 cm LVIDs (2D): 3 cm 
LVOT Area (2D): 3.5 cm2 LVPWd (2D): 1.2 cm Tissue Doppler Imaging LV Peak Early Echeverria Tissue Que; Mean; Lateral MA (TDI): 10 cm/s LV Peak Early Echeverria Tissue Que; Medial MA (TDI): 6.9 cm/s Unspecified Scan Mode Peak Grad; Mean; Antegrade Flow: 34 mm[Hg] Vmax; Antegrade Flow: 285 cm/s LVOT Diam: 2.1 cm 
MV Peak Que/LV Peak Tissue Que E-Wave; Lateral MA: 10.4 MV Peak Que/LV Peak Tissue Que E-Wave; Medial MA: 15.1 Prepared and signed by 
 
MD Jorge Ramirez Signed 23-Oct-2018 12:43:51 Labs and Studies from previous 24 hours have been personally reviewed by myself   
ASSESSMENT Active Hospital Problems Diagnosis Date Noted  Weakness 10/22/2018  Leukocytosis 10/22/2018  
 NSTEMI (non-ST elevated myocardial infarction) ([facility].) 09/19/2018  S/P CABG x 2 09/19/2018  COPD (chronic obstructive pulmonary disease) ([facility].) 09/19/2018  S/P AVR (aortic valve replacement) 05/25/2016  Coronary atherosclerosis of native coronary artery 10/22/2015  Mixed hyperlipidemia 10/22/2015 Hospital Problems as of 10/25/2018 Date Reviewed: 10/3/2018 Codes Class Noted - Resolved POA Weakness ICD-10-CM: R53.1 ICD-9-CM: 780.79  10/22/2018 - Present Yes Leukocytosis ICD-10-CM: Z08.378 ICD-9-CM: 288.60  10/22/2018 - Present Unknown S/P CABG x 2 (Chronic) ICD-10-CM: Z95.1 ICD-9-CM: V45.81  9/19/2018 - Present Yes * (Principal) NSTEMI (non-ST elevated myocardial infarction) ([facility] 75.) ICD-10-CM: I21.4 ICD-9-CM: 410.70  9/19/2018 - Present Unknown COPD (chronic obstructive pulmonary disease) (HCC) (Chronic) ICD-10-CM: J44.9 ICD-9-CM: 681  9/19/2018 - Present Yes S/P AVR (aortic valve replacement) (Chronic) ICD-10-CM: Z95.2 ICD-9-CM: V43.3  5/25/2016 - Present Yes Aortic stenosis ICD-10-CM: I35.0 ICD-9-CM: 424.1  10/22/2015 - Present Coronary atherosclerosis of native coronary artery (Chronic) ICD-10-CM: I25.10 ICD-9-CM: 414.01  10/22/2015 - Present Yes Mixed hyperlipidemia (Chronic) ICD-10-CM: I39.6 ICD-9-CM: 272.2  10/22/2015 - Present Yes A/P: 
 
-NSTEMI 
S/p ptca of acute stent thrombus of left main. 10/24 Management as per Cardiology team 
 
-Fever 
-suspected UTI Patient has been afebrile with no WBC elevation Ucx with skinmixed lara Blood cultures NG so far CXR with no acute disease reported On ceftriaxone day #2 for suspected UTI Switch abxs to Keflex PO to complete 3 more days treatment for a total of 5 days for suspected UTI. Please provide prescriptions for abxs upon discharge.  
 
-Aortic stenosis Management as per cardiology team 
 
-COPD No active wheezing Cont home inhalers Will sign off, patient has been afebrile and cultures has been negative. Please call if any questions. Tika Martines MD 
10/25/18

## 2018-10-25 NOTE — PROGRESS NOTES
Problem: Mobility Impaired (Adult and Pediatric) Goal: *Acute Goals and Plan of Care (Insert Text) LTG: 
(1.)Mr. Cornelius Duarte will move from supine to sit and sit to supine  and scoot up and down with MODIFIED INDEPENDENCE within 7 treatment day(s). (2.)Mr. Cornelius Duarte will transfer from bed to chair and chair to bed with MODIFIED INDEPENDENCE using the least restrictive device within 7 treatment day(s). (3.)Mr. Cornelius Duarte will ambulate with MODIFIED INDEPENDENCE for 150 feet with the least restrictive device within 7 treatment day(s). (4.)Mr. Cornelius Duarte will participate in 20+ min balance activities during single session within 7 treatment days. ________________________________________________________________________________________________ PHYSICAL THERAPY: Initial Assessment, Daily Note, Treatment Day: Day of Assessment, PM 10/25/2018INPATIENT: Hospital Day: 4 Payor: [employer] PLUS / [place] [employer] PLUS / Product Type: [facility] Medicare /  
  
NAME/AGE/GENDER: Himanshu Norris is a 68 y.o. male PRIMARY DIAGNOSIS: NSTEMI 
NSTEMI (non-ST elevated myocardial infarction) [facility]) NSTEMI (non-ST elevated myocardial infarction) ([facility] [address] NSTEMI (non-ST elevated myocardial infarction) ([facility] [address] ICD-10: Treatment Diagnosis:  
 · Generalized Muscle Weakness (M62.81) · Difficulty in walking, Not elsewhere classified (R26.2) · Other abnormalities of gait and mobility (R26.89) Precaution/Allergies: 
Advair diskus [fluticasone-salmeterol] and Prozac [fluoxetine] ASSESSMENT:  
Mr. Cornelius Duarte presents supine in bed and agreeable to therapy. He is A&O x3. When asked what year it is, he repeats \"2088\". He is weak, has decreased balance and decreased gait speed s/p NSTEMI. He currently lives in a one story home with his wife. There are no steps to enter and he ambulates around home with rolling walker at baseline.  He has home health nursing that sees him as well. AROM and PROM are [place], but he has 4/5 strength grossly in BLE. He uses bed rails to roll over from reclined bed position to come to sit. Able to do so with STAND BY ASSIST. Seated edge of bed, he was in a posterior pelvic tilt. Corrected with verbal cueing, but unable to hold. During dynamic movement, he has some retropulsive tendencies with trunk. Most noticeable in stance and ambulation. Treatment initiated to cue to Anterior pelvic tilt in stance, perform sit to stand with proper hip flexion, static stance while attending midline, and ambulation to improve mobility, gait speed and body mechanics. Mr. Criss Pickett is operating below his baseline, and would benefit from skilled physical therapy to increase balance, strengthen BLE, and increase gait speed in order to safely ambulate around his home. This section established at most recent assessment PROBLEM LIST (Impairments causing functional limitations): 1. Decreased Strength 2. Decreased ADL/Functional Activities 3. Decreased Transfer Abilities 4. Decreased Ambulation Ability/Technique 5. Decreased Balance 6. Increased Shortness of Breath INTERVENTIONS PLANNED: (Benefits and precautions of physical therapy have been discussed with the patient.) 1. Balance Exercise 2. Bed Mobility 3. Family Education 4. Gait Training 5. Home Exercise Program (HEP) 6. Manual Therapy 7. Neuromuscular Re-education/Strengthening 8. Range of Motion (ROM) 9. Therapeutic Activites 10. Therapeutic Exercise/Strengthening 11. Transfer Training TREATMENT PLAN: Frequency/Duration: 3 times a week for duration of hospital stay Rehabilitation Potential For Stated Goals: Good RECOMMENDED REHABILITATION/EQUIPMENT: (at time of discharge pending progress): Due to the probability of continued deficits (see above) this patient will likely need continued skilled physical therapy after discharge. Equipment: ? TBD HISTORY:  
 History of Present Injury/Illness (Reason for Referral): Luci Thorpe is a 68 y.o. male with prior history of coronary artery disease with two-vessel CABG in 1996 [saphenous vein graft to the LAD, saphenous vein graft to the distal RCA]. Also status post aortic valve replacement [25 mm [employer] mechanical valve] for severe aortic stenosis.  Recent non-ST elevation MI and underwent PCI to lt main, LCx, proximal saphenous vein graft to the LAD; patent saphenous vein graft to the RCA (9/20/18). 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 Prior tobacco abuse and recently quit (9/18).   Also COPD on oxygen supplementation with exertion (2L).  Echo with preserved EF and no noted wall motion abnormalities [9/18]. Post stenting, noted readmit for COPD exacerbation (9/26/18).  The readmitted to [facility] 10/11/18 with sepsis/UTI Past Medical History/Comorbidities:  
Mr. Twyla Camargo  has a past medical history of Aortic stenosis, Chest pain, COPD (chronic obstructive pulmonary disease) ([address] Coronary artery disease, Coronary atherosclerosis of native coronary artery, CVA (cerebrovascular accident) ([facility] [address] Essential hypertension, Mixed hyperlipidemia, and Tobacco use disorder. Mr. Twyla Camargo  has a past surgical history that includes hx coronary artery bypass graft (1996). Social History/Living Environment:  
Home Environment: Private residence # Steps to Enter: 0 One/Two Story Residence: One story Living Alone: No 
Support Systems: Spouse/Significant Other/Partner, Home care staff Patient Expects to be Discharged to[de-identified] Private residence Current DME Used/Available at Home: Shower chair, Walker, rolling Prior Level of Function/Work/Activity: 
Ambulates with rolling walker. Able to perform ADLs with help from wife Number of Personal Factors/Comorbidities that affect the Plan of Care: 1-2: MODERATE COMPLEXITY EXAMINATION:  
Most Recent Physical Functioning:  
Gross Assessment: AROM: Within functional limits PROM: Within functional limits Strength: Generally decreased, functional 
Sensation: Impaired(Diminished Light touch in RLE) Posture: 
Posture (WDL): Exceptions to [facility] Posture Assessment: Trunk flexion, Decreased, Rounded shoulders Balance: 
Sitting: Intact Standing: Impaired Standing - Static: Occassional;Fair 
Standing - Dynamic : Fair Bed Mobility: 
Supine to Sit: Stand-by assistance Scooting: Stand-by assistance Wheelchair Mobility: 
  
Transfers: 
Sit to Stand: Contact guard assistance Stand to Sit: Stand-by assistance Bed to Chair: Contact guard assistance Gait: 
  
Base of Support: Center of gravity altered;Narrowed Speed/Luciana: Pace decreased (<100 feet/min); Shuffled; Slow Gait Abnormalities: Altered arm swing;Decreased step clearance; Path deviations; Step to gait;Trunk sway increased Distance (ft): 35 Feet (ft) Assistive Device: Walker, rolling;Gait belt Ambulation - Level of Assistance: Contact guard assistance Interventions: Safety awareness training;Manual cues; Verbal cues; Visual/Demos Body Structures Involved: 1. Nerves 2. Heart 3. Lungs 4. Joints 5. Muscles Body Functions Affected: 1. Mental 
2. Cardio 3. Respiratory 4. Neuromusculoskeletal 
5. Movement Related Activities and Participation Affected: 1. General Tasks and Demands 2. Communication 3. Mobility 4. Interpersonal Interactions and Relationships 5. Community, Social and [place] Number of elements that affect the Plan of Care: 4+: HIGH COMPLEXITY CLINICAL PRESENTATION:  
Presentation: Stable and uncomplicated: LOW COMPLEXITY CLINICAL DECISION MAKIN [facility] [address] [address] AM-PAC 6 Clicks Basic Mobility Inpatient Short Form How much difficulty does the patient currently have. .. Unable A Lot A Little None 1. Turning over in bed (including adjusting bedclothes, sheets and blankets)?    [] 1   [] 2   [x] 3   [] 4  
 2.  Sitting down on and standing up from a chair with arms ( e.g., wheelchair, bedside commode, etc.)   [] 1   [] 2   [x] 3   [] 4  
3. Moving from lying on back to sitting on the side of the bed? [] 1   [] 2   [x] 3   [] 4 How much help from another person does the patient currently need. .. Total A Lot A Little None 4. Moving to and from a bed to a chair (including a wheelchair)? [] 1   [] 2   [x] 3   [] 4  
5. Need to walk in hospital room? [] 1   [] 2   [x] 3   [] 4  
6. Climbing 3-5 steps with a railing? [] 1   [x] 2   [] 3   [] 4  
© 2007, Trustees of [address] [place] [address] [address] [address], under license to [employer]. All rights reserved Score:  Initial: 17 Most Recent: X (Date: 10/25/18 ) Interpretation of Tool:  Represents activities that are increasingly more difficult (i.e. Bed mobility, Transfers, Gait). Score 24 23 22-20 19-15 14-10 9-7 6 Modifier CH CI CJ CK CL CM CN   
 
? Mobility - Walking and Moving Around:  
  - CURRENT STATUS: CK - 40%-59% impaired, limited or restricted  - GOAL STATUS: CJ - 20%-39% impaired, limited or restricted  - D/C STATUS:  ---------------To be determined--------------- Payor: [employer] C[employer] PLUS / [place] [employer] PLUS / Product Type: Managed Care Medicare /   
 
Medical Necessity:    
· Patient is expected to demonstrate progress in strength, range of motion, balance, coordination and functional technique to decrease assistance required with functional mobility. Reason for Services/Other Comments: 
· Patient continues to require skilled intervention due to patient unable to attend/participate in therapy as expected. Use of outcome tool(s) and clinical judgement create a POC that gives a: Questionable prediction of patient's progress: MODERATE COMPLEXITY  
  
 
 
 
TREATMENT:  
(In addition to Assessment/Re-Assessment sessions the following treatments were rendered) Pre-treatment Symptoms/Complaints: \"Trgopi's in office right? It's 2088. \" 
Pain: Initial:  
Pain Intensity 1: 0  Post Session:  0 Therapeutic Activity: (    36Min):  Therapeutic activities including Bed transfers, Chair transfers, Toilet transfers, Ambulation on level ground and sequencing walker with functional mobility to improve mobility, strength, balance and coordination. Required moderate Safety awareness training;Manual cues; Verbal cues; Visual/Demos to promote static and dynamic balance in standing. Cues to attend midline in stance. 4x sit to stand with emphasis on trunk flexion. Ambulation 28' with multiple cues to reinforce gait speed, walker sequencing, and facilitating trunk flexion. Braces/Orthotics/Lines/Etc:  
· IV 
· O2 Device: Nasal cannula Treatment/Session Assessment:   
· Response to Treatment:  No pain, some fatigue · Interdisciplinary Collaboration:  
o Physical Therapist 
o Registered Nurse 
o SPT · After treatment position/precautions:  
o Up in chair 
o Bed alarm/tab alert on 
o Call light within reach 
o Family at bedside · Compliance with Program/Exercises: Will assess as treatment progresses · Recommendations/Intent for next treatment session: \"Next visit will focus on advancements to more challenging activities\". Total Treatment Duration: PT Patient Time In/Time Out Time In: 4343 Time Out: 1510 Priyanka Coujanee

## 2018-10-25 NOTE — PROGRESS NOTES
Cardiac Rehab: Spoke with patient regarding referral to cardiac rehab. Patient meets admission criteria based on [facility]CA (10/14/18). Written information about Cardiac Rehab given and reviewed with patient and family. Discussed lifestyle modifications to promote cardiac wellness. Patient indicated that he has been contacted by the [facility] program as requested last month following MI. Family states that the patient will completed [facility] with PT then pursue Cardiac Rehab at [facility]. His Cardiologist is Dr. Madie Ford. Thank you, ANGEL MiramontesN, RN Cardiopulmonary Rehabilitation Nurse Liaison [facility]

## 2018-10-25 NOTE — PROGRESS NOTES
[facility] CARDIOLOGY PROGRESS NOTE 
 
10/25/2018 8:03 AM 
 
Admit Date: 10/22/2018 Admit Diagnosis: NSTEMI;NSTEMI (non-ST elevated myocardial infarction) ([facility] Subjective:  
Stable overnight without angina, CHF, or palpitations. Vitals stable and controlled. No other complaints overnight. Tolerating meds well. Weak with 4 admits to various hospitals over past month. PT consult today, OOB to chair and increase activity, home tomorrow if continues to do well. Objective:  
  
Vitals:  
 10/24/18 1800 10/24/18 2044 10/25/18 0039 10/25/18 0873 BP: 115/62 106/78 90/55 114/55 Pulse: 80 84 77 69 Resp: 20 19 18 18 Temp: 98.7 °F (37.1 °C) 97.7 °F (36.5 °C) 97.1 °F (36.2 °C) 96.9 °F (36.1 °C) SpO2: 100% 99% 96% 96% Weight:    85.6 kg (188 lb 12.8 oz) Physical Exam: 
Neck- supple, no JVD 
CV- regular rate and rhythm no MRG Lung- clear bilaterally Abd- soft, nontender, nondistended Ext- no edema. Right groin CDI Skin- warm and dry Data Review:  
Recent Labs 10/25/18 
9538 10/24/18 
2055 10/24/18 
3331 10/23/18 
3549   --  139 138  
K 3.8  --  4.0 3.0*  
MG  --   --  2.2 2.1 BUN 10  --  12 13 CREA 0.86  --  0.89 1.01  
GLU 84  --  87 116* WBC 9.5 10.5  --  10.3 HGB 11.0* 11.2*  --  11.3* HCT 33.4* 34.7*  --  32.9*  
 314  --  297 Assessment and Plan:  
 
Principal Problem: 
  NSTEMI (non-ST elevated myocardial infarction) ([facility] (9/19/2018)- improved, s/p PCI left main- DAPT on board, weak. PT consult today, OOB to chair, check labs in AM 
 
Active Problems: 
  Coronary atherosclerosis of native coronary artery - patent grafts, PCI left main yesterday, see above Mixed hyperlipidemia - stable, continue meds S/P AVR (aortic valve replacement) - stable, continue meds S/P CABG x 2 - stable, continue meds COPD (chronic obstructive pulmonary disease) (HCC) - stable, continue meds POPPY. Cozette Felty, MD 
[facility] Cardiology Pager [phone]

## 2018-10-25 NOTE — PROGRESS NOTES
Problem: Pressure Injury - Risk of 
Goal: *Prevention of pressure injury Document Danny Scale and appropriate interventions in the flowsheet. Outcome: Progressing Towards Goal 
Pressure Injury Interventions: 
Sensory Interventions: Pressure redistribution bed/mattress (bed type) Moisture Interventions: Absorbent underpads Activity Interventions: Increase time out of bed Mobility Interventions: HOB 30 degrees or less, PT/OT evaluation Nutrition Interventions: Document food/fluid/supplement intake Problem: Falls - Risk of 
Goal: *Absence of Falls Document Ever Levels Fall Risk and appropriate interventions in the flowsheet. Outcome: Progressing Towards Goal 
Fall Risk Interventions: 
Mobility Interventions: Patient to call before getting OOB Medication Interventions: Patient to call before getting OOB Elimination Interventions: Patient to call for help with toileting needs

## 2018-10-25 NOTE — PROGRESS NOTES
Bedside and Verbal shift change report given to self and May(oncoming nurse) by Carlos Enrique Carrasquillo RN (offgoing nurse). Report included the following information SBAR, Kardex.

## 2018-10-25 NOTE — PROGRESS NOTES
Bedside and Verbal shift change report given to [facility], RN (oncoming nurse) by self and Lisa Miller RN (offgoing nurse). Report included the following information SBAR, Kardex, MAR and Recent Results.

## 2018-10-25 NOTE — PROGRESS NOTES
Bedside and Verbal shift change report given to May (oncoming nurse) by self (offgoing nurse).  Report included the following information SBAR, Kardex and Recent Results.

## 2018-10-26 VITALS
HEART RATE: 74 BPM | OXYGEN SATURATION: 96 % | WEIGHT: 187 LBS | SYSTOLIC BLOOD PRESSURE: 105 MMHG | TEMPERATURE: 97.6 F | BODY MASS INDEX: 28.43 KG/M2 | RESPIRATION RATE: 17 BRPM | DIASTOLIC BLOOD PRESSURE: 59 MMHG

## 2018-10-26 PROBLEM — N39.0 UTI (URINARY TRACT INFECTION): Status: ACTIVE | Noted: 2018-10-26

## 2018-10-26 LAB
ANION GAP SERPL CALC-SCNC: 12 MMOL/L (ref 7–16)
BASOPHILS # BLD: 0.1 K/UL (ref 0–0.2)
BASOPHILS NFR BLD: 1 % (ref 0–2)
BUN SERPL-MCNC: 10 MG/DL (ref 8–23)
CALCIUM SERPL-MCNC: 8.1 MG/DL (ref 8.3–10.4)
CHLORIDE SERPL-SCNC: 108 MMOL/L (ref 98–107)
CO2 SERPL-SCNC: 19 MMOL/L (ref 21–32)
CREAT SERPL-MCNC: 0.92 MG/DL (ref 0.8–1.5)
DIFFERENTIAL METHOD BLD: ABNORMAL
EOSINOPHIL # BLD: 0.2 K/UL (ref 0–0.8)
EOSINOPHIL NFR BLD: 2 % (ref 0.5–7.8)
ERYTHROCYTE [DISTWIDTH] IN BLOOD BY AUTOMATED COUNT: 14.2 %
GLUCOSE SERPL-MCNC: 82 MG/DL (ref 65–100)
HCT VFR BLD AUTO: 33.2 % (ref 41.1–50.3)
HGB BLD-MCNC: 10.9 G/DL (ref 13.6–17.2)
IMM GRANULOCYTES # BLD: 0.4 K/UL (ref 0–0.5)
IMM GRANULOCYTES NFR BLD AUTO: 4 % (ref 0–5)
INR PPP: 2
LYMPHOCYTES # BLD: 1.5 K/UL (ref 0.5–4.6)
LYMPHOCYTES NFR BLD: 16 % (ref 13–44)
MAGNESIUM SERPL-MCNC: 1.9 MG/DL (ref 1.8–2.4)
MCH RBC QN AUTO: 30.7 PG (ref 26.1–32.9)
MCHC RBC AUTO-ENTMCNC: 32.8 G/DL (ref 31.4–35)
MCV RBC AUTO: 93.5 FL (ref 79.6–97.8)
MONOCYTES # BLD: 1.3 K/UL (ref 0.1–1.3)
MONOCYTES NFR BLD: 13 % (ref 4–12)
NEUTS SEG # BLD: 6.4 K/UL (ref 1.7–8.2)
NEUTS SEG NFR BLD: 65 % (ref 43–78)
NRBC # BLD: 0 K/UL (ref 0–0.2)
PLATELET # BLD AUTO: 321 K/UL (ref 150–450)
PMV BLD AUTO: 9.6 FL (ref 9.4–12.3)
POTASSIUM SERPL-SCNC: 3.5 MMOL/L (ref 3.5–5.1)
PROTHROMBIN TIME: 21.6 SEC (ref 11.5–14.5)
RBC # BLD AUTO: 3.55 M/UL (ref 4.23–5.6)
SODIUM SERPL-SCNC: 139 MMOL/L (ref 136–145)
WBC # BLD AUTO: 9.8 K/UL (ref 4.3–11.1)

## 2018-10-26 PROCEDURE — 74011250637 HC RX REV CODE- 250/637: Performed by: INTERNAL MEDICINE

## 2018-10-26 PROCEDURE — 74011250637 HC RX REV CODE- 250/637: Performed by: PHYSICIAN ASSISTANT

## 2018-10-26 PROCEDURE — 74011250637 HC RX REV CODE- 250/637: Performed by: NURSE PRACTITIONER

## 2018-10-26 PROCEDURE — 77030011256 HC DRSG MEPILEX <16IN NO BORD MOLN -A

## 2018-10-26 PROCEDURE — 85025 COMPLETE CBC W/AUTO DIFF WBC: CPT

## 2018-10-26 PROCEDURE — 83735 ASSAY OF MAGNESIUM: CPT

## 2018-10-26 PROCEDURE — 94640 AIRWAY INHALATION TREATMENT: CPT

## 2018-10-26 PROCEDURE — 85610 PROTHROMBIN TIME: CPT

## 2018-10-26 PROCEDURE — 80048 BASIC METABOLIC PNL TOTAL CA: CPT

## 2018-10-26 PROCEDURE — 77010033678 HC OXYGEN DAILY

## 2018-10-26 PROCEDURE — 36415 COLL VENOUS BLD VENIPUNCTURE: CPT

## 2018-10-26 PROCEDURE — 94760 N-INVAS EAR/PLS OXIMETRY 1: CPT

## 2018-10-26 RX ORDER — METOPROLOL SUCCINATE 25 MG/1
25 TABLET, EXTENDED RELEASE ORAL DAILY
Qty: 30 TAB | Refills: 11 | Status: SHIPPED | OUTPATIENT
Start: 2018-10-27 | End: 2018-10-26

## 2018-10-26 RX ORDER — POTASSIUM CHLORIDE 20 MEQ/1
40 TABLET, EXTENDED RELEASE ORAL
Status: COMPLETED | OUTPATIENT
Start: 2018-10-26 | End: 2018-10-26

## 2018-10-26 RX ORDER — METOPROLOL SUCCINATE 25 MG/1
25 TABLET, EXTENDED RELEASE ORAL DAILY
Qty: 30 TAB | Refills: 11 | Status: SHIPPED | OUTPATIENT
Start: 2018-10-27 | End: 2019-02-25 | Stop reason: SDUPTHER

## 2018-10-26 RX ORDER — CEPHALEXIN 250 MG/1
250 CAPSULE ORAL EVERY 6 HOURS
Qty: 10 CAP | Refills: 0 | Status: SHIPPED | OUTPATIENT
Start: 2018-10-26 | End: 2018-10-29

## 2018-10-26 RX ORDER — CEPHALEXIN 250 MG/1
250 CAPSULE ORAL EVERY 6 HOURS
Qty: 10 CAP | Refills: 0 | Status: SHIPPED | OUTPATIENT
Start: 2018-10-26 | End: 2018-10-26

## 2018-10-26 RX ORDER — FAMOTIDINE 40 MG/1
40 TABLET, FILM COATED ORAL 2 TIMES DAILY
Qty: 60 TAB | Refills: 1 | Status: SHIPPED | OUTPATIENT
Start: 2018-10-26 | End: 2019-01-17

## 2018-10-26 RX ORDER — FAMOTIDINE 40 MG/1
40 TABLET, FILM COATED ORAL 2 TIMES DAILY
Qty: 60 TAB | Refills: 1 | Status: SHIPPED | OUTPATIENT
Start: 2018-10-26 | End: 2018-10-26

## 2018-10-26 RX ADMIN — ATORVASTATIN CALCIUM 40 MG: 40 TABLET, FILM COATED ORAL at 09:00

## 2018-10-26 RX ADMIN — TICAGRELOR 90 MG: 90 TABLET ORAL at 09:00

## 2018-10-26 RX ADMIN — TOPIRAMATE 100 MG: 25 TABLET, FILM COATED ORAL at 09:00

## 2018-10-26 RX ADMIN — ASPIRIN 81 MG: 81 TABLET, COATED ORAL at 09:00

## 2018-10-26 RX ADMIN — TAMSULOSIN HYDROCHLORIDE 0.4 MG: 0.4 CAPSULE ORAL at 08:59

## 2018-10-26 RX ADMIN — POTASSIUM CHLORIDE 40 MEQ: 20 TABLET, EXTENDED RELEASE ORAL at 08:59

## 2018-10-26 RX ADMIN — Medication 10 ML: at 06:11

## 2018-10-26 RX ADMIN — LISINOPRIL 2.5 MG: 5 TABLET ORAL at 09:05

## 2018-10-26 RX ADMIN — FAMOTIDINE 40 MG: 20 TABLET ORAL at 08:59

## 2018-10-26 RX ADMIN — CEPHALEXIN 250 MG: 250 CAPSULE ORAL at 08:59

## 2018-10-26 RX ADMIN — TIOTROPIUM BROMIDE 18 MCG: 18 CAPSULE ORAL; RESPIRATORY (INHALATION) at 08:00

## 2018-10-26 RX ADMIN — METOPROLOL SUCCINATE 25 MG: 25 TABLET, EXTENDED RELEASE ORAL at 09:05

## 2018-10-26 RX ADMIN — LEVOTHYROXINE SODIUM 25 MCG: 50 TABLET ORAL at 06:10

## 2018-10-26 NOTE — PROGRESS NOTES
Problem: Falls - Risk of 
Goal: *Absence of Falls Document Debria Check Fall Risk and appropriate interventions in the flowsheet. Outcome: Progressing Towards Goal 
Fall Risk Interventions: 
Mobility Interventions: Communicate number of staff needed for ambulation/transfer, Patient to call before getting OOB, Bed/chair exit alarm Medication Interventions: Assess postural VS orthostatic hypotension, Patient to call before getting OOB, Teach patient to arise slowly, Bed/chair exit alarm Elimination Interventions: Call light in reach, Patient to call for help with toileting needs, Toileting schedule/hourly rounds, Bed/chair exit alarm

## 2018-10-26 NOTE — PROGRESS NOTES
Pt to be dc home today with HH. CM sent order & referral to [address] [place] [address]. CM notified [facility] of dc home. No further needs noted. Care Management Interventions PCP Verified by CM: Yes Last Visit to PCP: 10/05/18 Mode of Transport at Discharge: Other (see comment)(JOSEPH Lee [phone]) Transition of Care Consult (CM Consult): Discharge Planning Discharge Durable Medical Equipment: No 
Physical Therapy Consult: No 
Occupational Therapy Consult: No 
Speech Therapy Consult: No 
Current Support Network: Own Home(Lives with SO) Confirm Follow Up Transport: Family Plan discussed with Pt/Family/Caregiver: Yes Freedom of Choice Offered: Yes Discharge Location Discharge Placement: Home with home health

## 2018-10-26 NOTE — PROGRESS NOTES
Bedside and Verbal shift change report given to self (oncoming nurse) by Magdalene Ramirez RN (offgoing nurse). Report included the following information SBAR, Kardex and MAR. R groin site CDI with no evidence of bleeding or hematoma.

## 2018-10-26 NOTE — PROGRESS NOTES
Bedside and Verbal shift change report given to Bertrand Ellis RN (oncoming nurse) by self and Lisa Miller RN (offgoing nurse). Report included the following information SBAR, Kardex, Recent Results and Cardiac Rhythm SR PVCs.

## 2018-10-26 NOTE — PROGRESS NOTES
Problem: Pressure Injury - Risk of 
Goal: *Prevention of pressure injury Document Danny Scale and appropriate interventions in the flowsheet. Outcome: Progressing Towards Goal 
Pressure Injury Interventions: 
Sensory Interventions: Maintain/enhance activity level Moisture Interventions: Absorbent underpads, Limit adult briefs, Maintain skin hydration (lotion/cream) Activity Interventions: Increase time out of bed Mobility Interventions: PT/OT evaluation, HOB 30 degrees or less Nutrition Interventions: Document food/fluid/supplement intake Problem: Falls - Risk of 
Goal: *Absence of Falls Document Ever Levels Fall Risk and appropriate interventions in the flowsheet. Outcome: Progressing Towards Goal 
Fall Risk Interventions: 
Mobility Interventions: Patient to call before getting OOB Medication Interventions: Patient to call before getting OOB Elimination Interventions: Call light in reach, Patient to call for help with toileting needs

## 2018-10-26 NOTE — DISCHARGE INSTRUCTIONS
Cardiac Catheterization/Angiography Discharge Instructions    *Check the puncture site frequently for swelling or bleeding. If you see any bleeding, lie down and apply pressure over the area with a clean town or washcloth. Notify your doctor for any redness, swelling, drainage or oozing from the puncture site. Notify your doctor for any fever or chills. *If the leg or arm with the puncture becomes cold, numb or painful, call Dr Rock Dyson at  [phone]. *Activity should be limited for the next 48 hours. Climb stairs as little as possible and avoid any stooping, bending or strenuous activity for 48 hours. No heavy lifting (anything over 10 pounds) for three days. *Do not drive for 48 hours. *You may resume your usual diet. Drink more fluids than usual.    *Have a responsible person drive you home and stay with you for at least 24 hours after your heart catheterization/angiography. *You may remove the bandage from your Right and Groin in 24 hours. You may shower in 24 hours. No tub baths, hot tubs or swimming for one week. Do not place any lotions, creams, powders, ointments over the puncture site for one week. You may place a clean band-aid over the puncture site each day for 5 days. Change this daily. Percutaneous Coronary Intervention: What to Expect at [facility]    Percutaneous coronary intervention (PCI) is the name for procedures that are used to open a narrowed or blocked coronary artery. The two most common PCI procedures are coronary angioplasty and coronary stent placement. Your groin or arm may have a bruise and feel sore for a day or two after a percutaneous coronary intervention (PCI). You can do light activities around the house, but nothing strenuous for several days. This care sheet gives you a general idea about how long it will take for you to recover. But each person recovers at a different pace.  Follow the steps below to get better as quickly as possible. How can you care for yourself at home? Activity    · If the doctor gave you a sedative:  ? For 24 hours, don't do anything that requires attention to detail. It takes time for the medicine's effects to completely wear off.  ? For your safety, do not drive or operate any machinery that could be dangerous. Wait until the medicine wears off and you can think clearly and react easily.     · Do not do strenuous exercise and do not lift, pull, or push anything heavy until your doctor says it is okay. This may be for a day or two. You can walk around the house and do light activity, such as cooking.     · If the catheter was placed in your groin, try not to walk up stairs for the first couple of days.     · If the catheter was placed in your arm near your wrist, do not bend your wrist deeply for the first couple of days. Be careful using your hand to get into and out of a chair or bed.     · Carry your stent identification card with you at all times.     · If your doctor recommends it, get more exercise. Walking is a good choice. Bit by bit, increase the amount you walk every day. Try for at least 30 minutes on most days of the week. Diet    · Drink plenty of fluids to help your body flush out the dye. If you have kidney, heart, or liver disease and have to limit fluids, talk with your doctor before you increase the amount of fluids you drink.     · Keep eating a heart-healthy diet that has lots of fruits, vegetables, and whole grains. If you have not been eating this way, talk to your doctor. You also may want to talk to a dietitian. This expert can help you to learn about healthy foods and plan meals. Medicines    · Your doctor will tell you if and when you can restart your medicines. He or she will also give you instructions about taking any new medicines.     · If you take blood thinners, such as warfarin (Coumadin), clopidogrel (Plavix), or aspirin, be sure to talk to your doctor.  He or she will tell you if and when to start taking those medicines again. Make sure that you understand exactly what your doctor wants you to do.     · Your doctor will prescribe blood-thinning medicines. You will likely take aspirin plus another antiplatelet, such as clopidogrel (Plavix). It is very important that you take these medicines exactly as directed. These medicines help keep the coronary artery open and reduce your risk of a heart attack.     · Call your doctor if you think you are having a problem with your medicine.    Care of the catheter site    · For 1 or 2 days, keep a bandage over the spot where the catheter was inserted. The bandage probably will fall off in this time.     · Put ice or a cold pack on the area for 10 to 20 minutes at a time to help with soreness or swelling. Put a thin cloth between the ice and your skin.     · You may shower 24 to 48 hours after the procedure, if your doctor okays it. Pat the incision dry.     · Do not soak the catheter site until it is healed. Don't take a bath for 1 week, or until your doctor tells you it is okay.     · If you are bleeding, lie down and press on the area for 15 minutes to try to make it stop. If the bleeding does not stop, call your doctor or seek immediate medical care. Follow-up care is a key part of your treatment and safety. Be sure to make and go to all appointments, and call your doctor if you are having problems. It's also a good idea to know your test results and keep a list of the medicines you take. When should you call for help? Call 911 anytime you think you may need emergency care. For example, call if:    · You passed out (lost consciousness).     · You have severe trouble breathing.     · You have sudden chest pain and shortness of breath, or you cough up blood.     · You have symptoms of a heart attack, such as:  ? Chest pain or pressure. ? Sweating. ? Shortness of breath. ? Nausea or vomiting.   ? Pain that spreads from the chest to the neck, jaw, or one or both shoulders or arms. ? Dizziness or lightheadedness. ? A fast or uneven pulse. After calling 911, chew 1 adult-strength aspirin. Wait for an ambulance. Do not try to drive yourself.     · You have been diagnosed with angina, and you have angina symptoms that do not go away with rest or are not getting better within 5 minutes after you take one dose of nitroglycerin.    Call your doctor now or seek immediate medical care if:    · You are bleeding from the area where the catheter was put in your artery.     · You have a fast-growing, painful lump at the catheter site.     · You have signs of infection, such as:  ? Increased pain, swelling, warmth, or redness. ? Red streaks leading from the catheter site. ? Pus draining from the catheter site. ? A fever.     · Your leg or arm looks blue or feels cold, numb, or tingly.    Watch closely for changes in your health, and be sure to contact your doctor if you have any problems. Where can you learn more? Go to http://sukhdev-vahe.info/. Enter Y197 in the search box to learn more about \"Percutaneous Coronary Intervention: What to Expect at Home. \"  Current as of: December 6, 2017  Content Version: 11.8  © 1295-1583 [employer]. Care instructions adapted under license by [employer] (which disclaims liability or warranty for this information). If you have questions about a medical condition or this instruction, always ask your healthcare professional. [place] [address] any warranty or liability for your use of this information.

## 2018-10-26 NOTE — DISCHARGE SUMMARY
[address] Cardiology Discharge Summary     Patient ID:  Rosemary Quintero  [id number]  22 y.o.  1945    Admit date: 10/22/2018    Discharge date:  10/26/2018    Admitting Physician: Paco Yan MD     Discharge Physician: ASHANTI Glover/Dr. Edwin Watson    Admission Diagnoses: NSTEMI  NSTEMI (non-ST elevated myocardial infarction) [facility])    Discharge Diagnoses:    Diagnosis    Weakness    Leukocytosis    Anticoagulated on Coumadin    Current chronic use of systemic steroids    Acquired hypothyroidism    S/P CABG x 2    [facility]non-ST elevated [facility] infarction) [facility] [address] [facility]    COPD (chronic obstructive pulmonary disease) [facility]    Long term (current) use of anticoagulants    S/P AVR (aortic valve replacement)    Aortic stenosis    Coronary atherosclerosis of native coronary artery    Mixed hyperlipidemia    Tobacco use disorder    Essential hypertension    Chest pain   UTI    Transitional care follow up with [facility] [address] Dr[address] Radha Cat Nov 8 at 2:00[facility] office    Cardiology Procedures this admission:  Left heart catheterization with PCI, echo   Consults: [facility] Course: [place] [facility] [place] [facility] with complaints of chest pain which felt like heaviness with a pre-syncopal episode and weakness, with recent NSTEMI w PCI to L main and Lcx. He had been admitted to [facility] w UTI/sepsis and discharged home since last NSTEMI. Patient was evaluated at [facility] and subsequently transferred to [facility] and admitted for further cardiac evaluation and treatment. Cardiac enzymes were elevated at 11.1 and 13.7. He had a h/o mechanical AVR and INR was 1.8, coumadin held, heparin added. He had diarrhea and leukocytosis and hospitalist was consulted. Augmentin was stopped due to diarrhea. Stool and urine culture ordered. Hospitalist felt diarrhea was related to laxative use.     Echo showed    -  Left ventricle: Systolic function was normal. Ejection fraction was  estimated in the range of 55 % to 60 %. There were no regional wall motion  abnormalities. Wall thickness was mildly increased. -  Aortic valve: A [employer] Sigifredo mechanical prosthesis was present (1996). -  Mitral valve: There was mild regurgitation. Ernesto Hidalgo, systemic arteries: There was dilatation of the ascending aorta  measuring 4.55cm. [facility] was planned for 10-26-18. Patient underwent cardiac catheterization by Dr. Nasima Connors and was found to have a 80% distal thrombus of the L main and angioplasty performed with 0% residual stenosis. SVG to LAD and SVG to RCA patent. Patient tolerated the procedure well and returned to the telemetry floor for recovery. Pt developed a fever. His diarrhea resolved. Ceftriaxone was started for UTI. Pt was weak and PT consulted. He remained afebrile, urine culture negative, antibiotics changed to keflex to complete a total of 5 days of antibiotics for suspected UTI. On 10-26 INR was 2 and his coumadin was restarted for h/o mech AVR. The morning of 10/26/2018 patient was up feeling well without any complaints of chest pain or shortness of breath. Patient's right femoral cath site was clean, dry and intact without hematoma or bruit. Patient's labs were WNL w stable hgb of 10.9, K 3.5 replaced prior to discharge. Patient was seen and examined by Dr. Moises Calhoun and determined stable and ready for discharge. Patient was instructed on the importance of medication compliance including taking aspirin and Brilinta everyday without missing a dose. After receiving drug eluting stents, the patient will need to be on dual anti-platelet therapy for at least 1 year. Indication for treatment and risk of dual antiplatelet therapy was discussed with the patient and he understands to seek medical care immediately if any signs of bleeding (especially in light of need for coumadin for mech AVR as well).   For maximized medical therapy of CAD, patient will continue use of BB, ACE-I, and statin as well. The patient will have close transitional care follow up with [facility] Cardiology Dr Apurva Chance Nov 9 at 11:00 at [facility] office (pt wants to change doctors in our office) and has been referred to cardiac rehab. He to recheck INR Tuesday (checks at home) and call to our office. [facility] services with S will be resumed on discharge. DISPOSITION: The patient is being discharged home in stable condition on a low saturated fat, low cholesterol and low salt diet. The patient is instructed to advance activities as tolerated to the limit of fatigue or shortness of breath. The patient is instructed to avoid all heavy lifting, straining, stooping or squatting for 3-5 days. The patient is instructed to watch the cath site for bleeding/oozing; if seen, the patient is instructed to apply firm pressure with a clean cloth and call [facility] Cardiology at [phone]. The patient is instructed to watch for signs of infection which include: increasing area of redness, fever/hot to touch or purulent drainage at the catheterization site. The patient is instructed not to soak in a bathtub for 7-10 days, but is cleared to shower. The patient is instructed to call the office or return to the ER for immediate evaluation for any shortness of breath or chest pain not relieved by NTG. Discharge Exam:   Visit Vitals  /59 (BP Patient Position: At rest)   Pulse 74   Temp 97.6 °F (36.4 °C)   Resp 17   Wt 84.8 kg (187 lb)   SpO2 96%   BMI 28.43 kg/m²     Patient has been seen by Dr. Cox Cough: see his progress note for exam details.     Recent Results (from the past 24 hour(s))   CBC WITH AUTOMATED DIFF    Collection Time: 10/26/18  3:46 AM   Result Value Ref Range    WBC 9.8 4.3 - 11.1 K/uL    RBC 3.55 (L) 4.23 - 5.6 M/uL    HGB 10.9 (L) 13.6 - 17.2 g/dL    HCT 33.2 (L) 41.1 - 50.3 %    MCV 93.5 79.6 - 97.8 FL    MCH 30.7 26.1 - 32.9 PG    MCHC 32.8 31.4 - 35.0 g/dL    RDW 14.2 %    PLATELET 521 512 - 450 K/uL    MPV 9.6 9.4 - 12.3 FL    ABSOLUTE NRBC 0.00 0.0 - 0.2 K/uL    DF AUTOMATED      NEUTROPHILS 65 43 - 78 %    LYMPHOCYTES 16 13 - 44 %    MONOCYTES 13 (H) 4.0 - 12.0 %    EOSINOPHILS 2 0.5 - 7.8 %    BASOPHILS 1 0.0 - 2.0 %    IMMATURE GRANULOCYTES 4 0.0 - 5.0 %    ABS. NEUTROPHILS 6.4 1.7 - 8.2 K/UL    ABS. LYMPHOCYTES 1.5 0.5 - 4.6 K/UL    ABS. MONOCYTES 1.3 0.1 - 1.3 K/UL    ABS. EOSINOPHILS 0.2 0.0 - 0.8 K/UL    ABS. BASOPHILS 0.1 0.0 - 0.2 K/UL    ABS. IMM. GRANS. 0.4 0.0 - 0.5 K/UL   METABOLIC PANEL, BASIC    Collection Time: 10/26/18  3:46 AM   Result Value Ref Range    Sodium 139 136 - 145 mmol/L    Potassium 3.5 3.5 - 5.1 mmol/L    Chloride 108 (H) 98 - 107 mmol/L    CO2 19 (L) 21 - 32 mmol/L    Anion gap 12 7 - 16 mmol/L    Glucose 82 65 - 100 mg/dL    BUN 10 8 - 23 MG/DL    Creatinine 0.92 0.8 - 1.5 MG/DL    GFR est AA >60 >60 ml/min/1.73m2    GFR est non-AA >60 >60 ml/min/1.73m2    Calcium 8.1 (L) 8.3 - 10.4 MG/DL   MAGNESIUM    Collection Time: 10/26/18  3:46 AM   Result Value Ref Range    Magnesium 1.9 1.8 - 2.4 mg/dL         Patient Instructions:   Current Discharge Medication List      START taking these medications    Details   metoprolol succinate (TOPROL-XL) 25 mg XL tablet Take 1 Tab by mouth daily. Qty: 30 Tab, Refills: 11      cephALEXin (KEFLEX) 250 mg capsule Take 1 Cap by mouth every six (6) hours for 10 doses. Qty: 10 Cap, Refills: 0      famotidine (PEPCID) 40 mg tablet Take 1 Tab by mouth two (2) times a day. Qty: 60 Tab, Refills: 1      ticagrelor (BRILINTA) 90 mg tablet Take 1 Tab by mouth every twelve (12) hours every twelve (12) hours. Qty: 60 Tab, Refills: 11         CONTINUE these medications which have NOT CHANGED    Details   vitamin a-vitamin c-vit e-min (OCUVITE) tablet Take 1 Tab by mouth daily. !! warfarin (COUMADIN) 2 mg tablet Take 2 mg by mouth daily. omeprazole (PRILOSEC) 40 mg capsule Take 40 mg by mouth daily.       lisinopril (Bryant Saad) 2.5 mg tablet Take 1 Tab by mouth daily. Qty: 30 Tab, Refills: 11      nitroglycerin (NITROSTAT) 0.4 mg SL tablet 1 Tab by SubLINGual route every five (5) minutes as needed for Chest Pain. Up to 3 doses. Qty: 1 Bottle, Refills: 5      fish oil-omega-3 fatty acids 340-1,000 mg capsule Take 1 Cap by mouth daily. !! warfarin (COUMADIN) 1 mg tablet Take 1 mg by mouth daily. Brand name      potassium 99 mg tablet Take 99 mg by mouth daily. MAGNESIUM PO Take  by mouth. tiotropium (SPIRIVA) 18 mcg inhalation capsule Take 1 Cap by inhalation daily. tamsulosin (FLOMAX) 0.4 mg capsule Take 0.4 mg by mouth daily. topiramate (TOPAMAX) 100 mg tablet Take 100 mg by mouth daily. venlafaxine-SR (EFFEXOR-XR) 75 mg capsule Take 75 mg by mouth nightly. aspirin delayed-release 81 mg tablet Take  by mouth daily. atorvastatin (LIPITOR) 40 mg tablet Take  by mouth daily. HYDROcodone-acetaminophen (NORCO) 7.5-325 mg per tablet Take  by mouth.      levothyroxine (SYNTHROID) 25 mcg tablet Take  by mouth Daily (before breakfast). !! - Potential duplicate medications found. Please discuss with provider. STOP taking these medications       amoxicillin 500 mg tab Comments:   Reason for Stopping:         clopidogrel (PLAVIX) 75 mg tab Comments:   Reason for Stopping:                 Signed:  Ramirez Patel PA-C  10/26/2018  7:24 AM    ATTENDING ADDENDUM:    Patient seen and examined by me. Agree with above note by physician extender. Key findings are:  No CP or GILLESPIE, no CHF or palpitations. INR 2.0. Ready to go home today on meds as above. CV- RRR without murmur, crisp AVR click  Lungs- Clear bilaterally  Abd- soft, nontender, nondistended  Ext- no edema    Plan: As above. Will need triple therapy for a while with ASA, Brilinta and warfarin. Try to keep INR 2-2.5.   Follow up closely in office to prevent rapid readmission (readmitted 4 times at various hospitals in the past month or so. Myrna Silva MD  [facility] Cardiology  Pager [phone]

## 2018-10-27 LAB
BACTERIA SPEC CULT: NORMAL
BACTERIA SPEC CULT: NORMAL
SERVICE CMNT-IMP: NORMAL
SERVICE CMNT-IMP: NORMAL

## 2018-11-12 ENCOUNTER — HOSPITAL ENCOUNTER (INPATIENT)
Age: 73
LOS: 4 days | Discharge: NURSING, REHAB OR LONG-TERM CARE | DRG: 314 | End: 2018-11-16
Attending: INTERNAL MEDICINE | Admitting: INTERNAL MEDICINE
Payer: COMMERCIAL

## 2018-11-12 DIAGNOSIS — G89.29 OTHER CHRONIC PAIN: Primary | ICD-10-CM

## 2018-11-12 PROBLEM — I38 ENDOCARDITIS: Status: ACTIVE | Noted: 2018-11-12

## 2018-11-12 PROBLEM — N39.0 UTI (URINARY TRACT INFECTION): Status: RESOLVED | Noted: 2018-10-26 | Resolved: 2018-11-12

## 2018-11-12 LAB
ERYTHROCYTE [DISTWIDTH] IN BLOOD BY AUTOMATED COUNT: 14.6 %
HCT VFR BLD AUTO: 31.4 % (ref 41.1–50.3)
HGB BLD-MCNC: 11 G/DL (ref 13.6–17.2)
INR PPP: 4
MAGNESIUM SERPL-MCNC: 2.1 MG/DL (ref 1.8–2.4)
MCH RBC QN AUTO: 31.4 PG (ref 26.1–32.9)
MCHC RBC AUTO-ENTMCNC: 35 G/DL (ref 31.4–35)
MCV RBC AUTO: 89.7 FL (ref 79.6–97.8)
NRBC # BLD: 0 K/UL (ref 0–0.2)
PLATELET # BLD AUTO: 230 K/UL (ref 150–450)
PMV BLD AUTO: 10.2 FL (ref 9.4–12.3)
PROTHROMBIN TIME: 36.9 SEC (ref 11.5–14.5)
RBC # BLD AUTO: 3.5 M/UL (ref 4.23–5.6)
WBC # BLD AUTO: 18.5 K/UL (ref 4.3–11.1)

## 2018-11-12 PROCEDURE — 85027 COMPLETE CBC AUTOMATED: CPT

## 2018-11-12 PROCEDURE — 36415 COLL VENOUS BLD VENIPUNCTURE: CPT

## 2018-11-12 PROCEDURE — 65660000000 HC RM CCU STEPDOWN

## 2018-11-12 PROCEDURE — 74011250637 HC RX REV CODE- 250/637: Performed by: NURSE PRACTITIONER

## 2018-11-12 PROCEDURE — 93005 ELECTROCARDIOGRAM TRACING: CPT | Performed by: NURSE PRACTITIONER

## 2018-11-12 PROCEDURE — 85610 PROTHROMBIN TIME: CPT

## 2018-11-12 PROCEDURE — 83735 ASSAY OF MAGNESIUM: CPT

## 2018-11-12 RX ORDER — TOPIRAMATE 25 MG/1
100 TABLET ORAL DAILY
Status: DISCONTINUED | OUTPATIENT
Start: 2018-11-13 | End: 2018-11-16 | Stop reason: HOSPADM

## 2018-11-12 RX ORDER — LISINOPRIL 5 MG/1
2.5 TABLET ORAL DAILY
Status: DISCONTINUED | OUTPATIENT
Start: 2018-11-13 | End: 2018-11-16 | Stop reason: HOSPADM

## 2018-11-12 RX ORDER — SODIUM CHLORIDE 0.9 % (FLUSH) 0.9 %
5-10 SYRINGE (ML) INJECTION AS NEEDED
Status: DISCONTINUED | OUTPATIENT
Start: 2018-11-12 | End: 2018-11-16 | Stop reason: HOSPADM

## 2018-11-12 RX ORDER — SODIUM CHLORIDE 0.9 % (FLUSH) 0.9 %
5-10 SYRINGE (ML) INJECTION EVERY 8 HOURS
Status: DISCONTINUED | OUTPATIENT
Start: 2018-11-12 | End: 2018-11-16 | Stop reason: HOSPADM

## 2018-11-12 RX ORDER — ATORVASTATIN CALCIUM 40 MG/1
40 TABLET, FILM COATED ORAL DAILY
Status: DISCONTINUED | OUTPATIENT
Start: 2018-11-13 | End: 2018-11-16 | Stop reason: HOSPADM

## 2018-11-12 RX ORDER — PREDNISONE 5 MG/1
10 TABLET ORAL
Status: DISCONTINUED | OUTPATIENT
Start: 2018-11-13 | End: 2018-11-16 | Stop reason: HOSPADM

## 2018-11-12 RX ORDER — METOPROLOL SUCCINATE 25 MG/1
25 TABLET, EXTENDED RELEASE ORAL DAILY
Status: DISCONTINUED | OUTPATIENT
Start: 2018-11-13 | End: 2018-11-16 | Stop reason: HOSPADM

## 2018-11-12 RX ORDER — VENLAFAXINE HYDROCHLORIDE 75 MG/1
75 CAPSULE, EXTENDED RELEASE ORAL
Status: DISCONTINUED | OUTPATIENT
Start: 2018-11-12 | End: 2018-11-16 | Stop reason: HOSPADM

## 2018-11-12 RX ORDER — ASPIRIN 81 MG/1
81 TABLET ORAL DAILY
Status: DISCONTINUED | OUTPATIENT
Start: 2018-11-13 | End: 2018-11-14

## 2018-11-12 RX ORDER — PANTOPRAZOLE SODIUM 40 MG/1
40 TABLET, DELAYED RELEASE ORAL
Status: DISCONTINUED | OUTPATIENT
Start: 2018-11-13 | End: 2018-11-16 | Stop reason: HOSPADM

## 2018-11-12 RX ORDER — TAMSULOSIN HYDROCHLORIDE 0.4 MG/1
0.4 CAPSULE ORAL DAILY
Status: DISCONTINUED | OUTPATIENT
Start: 2018-11-13 | End: 2018-11-16 | Stop reason: HOSPADM

## 2018-11-12 RX ORDER — MORPHINE SULFATE 2 MG/ML
2 INJECTION, SOLUTION INTRAMUSCULAR; INTRAVENOUS
Status: DISCONTINUED | OUTPATIENT
Start: 2018-11-12 | End: 2018-11-16 | Stop reason: HOSPADM

## 2018-11-12 RX ORDER — FAMOTIDINE 20 MG/1
40 TABLET, FILM COATED ORAL 2 TIMES DAILY
Status: DISCONTINUED | OUTPATIENT
Start: 2018-11-12 | End: 2018-11-16 | Stop reason: HOSPADM

## 2018-11-12 RX ORDER — LEVOTHYROXINE SODIUM 50 UG/1
25 TABLET ORAL
Status: DISCONTINUED | OUTPATIENT
Start: 2018-11-13 | End: 2018-11-16 | Stop reason: HOSPADM

## 2018-11-12 RX ORDER — WARFARIN 1 MG/1
1 TABLET ORAL
Status: DISCONTINUED | OUTPATIENT
Start: 2018-11-13 | End: 2018-11-12 | Stop reason: CLARIF

## 2018-11-12 RX ORDER — NITROGLYCERIN 0.4 MG/1
0.4 TABLET SUBLINGUAL
Status: DISCONTINUED | OUTPATIENT
Start: 2018-11-12 | End: 2018-11-16 | Stop reason: HOSPADM

## 2018-11-12 RX ORDER — WARFARIN 1 MG/1
2 TABLET ORAL
Status: DISCONTINUED | OUTPATIENT
Start: 2018-11-14 | End: 2018-11-12 | Stop reason: CLARIF

## 2018-11-12 RX ADMIN — Medication 10 ML: at 22:00

## 2018-11-12 RX ADMIN — TICAGRELOR 90 MG: 90 TABLET ORAL at 21:58

## 2018-11-12 RX ADMIN — FAMOTIDINE 40 MG: 20 TABLET ORAL at 21:57

## 2018-11-12 RX ADMIN — VENLAFAXINE HYDROCHLORIDE 75 MG: 75 CAPSULE, EXTENDED RELEASE ORAL at 21:58

## 2018-11-12 NOTE — H&P
[facility] CARDIOLOGY History &Physical 
            
 
Primary Cardiologist: Dr Ion Hager 
 
Primary Care Physician: Dr Oscar Diego Admitting Physician: Dr Maxwell Bell Subjective:  
 
Patient is a 68 y.o. male who has a hx of HLD, recent NSTEMI 10/2018 s/p PCI to LM/Circ/SVG to LAD, with prior CABG 1996 consisting of an SVG to LAD, SVG to RCA, CVA, HTN, COPD, and AS w/ 25 mm St Sigifredo Mechanical.  The patient is currently on Warfarin and Brilinta with a INR of 3.7 on 2018. The patient was admitted to [facility] on 10[facility]10/2018 with confusion with positive blood cultures. Dr Maxwell Bell accepted the patient for a tranfer for CHRISTINE and further investigation for possible endocarditis and/or cardiac abscess. He is confused upon arrival and somewhat belligerent. He denies angina, CHF, or palpitations but has a long 1st deg AVB with sinus arrhythmia and PAC's on tele. He denies fevers or chills. [facility] 54[facility] and becomes somewhat irritated and curses to further questioning. He has no family at bedside tonight. He will need someone else to consent for CHRISTINE in the AM. Past Medical History:  
Diagnosis Date  Aortic stenosis 10/22/2015  Chest pain 10/22/2015  COPD (chronic obstructive pulmonary disease) (HCC)  Coronary artery disease  Coronary atherosclerosis of native coronary artery 10/22/2015  CVA (cerebrovascular accident) ([facility] [address] 2004  
 right hemispheric  Essential hypertension 10/22/2015  Mixed hyperlipidemia 10/22/2015  Tobacco use disorder 10/22/2015 Past Surgical History:  
Procedure Laterality Date [facility] Allergies Allergen Reactions  Advair Diskus [Fluticasone-Salmeterol] Unknown (comments)  
  rash  Prozac [Fluoxetine] Unable to Obtain Social History Tobacco Use  Smoking status: Former Smoker Packs/day: 0.25 Last attempt to quit: 2018 Years since quittin.1  Smokeless tobacco: Never Used  Tobacco comment: trying to quit Substance Use Topics  Alcohol use: No  
  
FH: No family history on file. Review of Systems Constitution: Positive for weakness. Negative for chills, decreased appetite and fever. HENT: Negative for congestion, hoarse voice and sore throat. Eyes: Negative for blurred vision, double vision and photophobia. Cardiovascular: Negative for chest pain, dyspnea on exertion, irregular heartbeat and leg swelling. Respiratory: Negative for cough and shortness of breath. Endocrine: Negative for cold intolerance and heat intolerance. Hematologic/Lymphatic: Negative for adenopathy and bleeding problem. Skin: Negative for color change, dry skin and nail changes. Musculoskeletal: Negative for arthritis, back pain and falls. Gastrointestinal: Negative for abdominal pain, constipation and diarrhea. Genitourinary: Negative for bladder incontinence and dysuria. Neurological: Negative for excessive daytime sleepiness, dizziness and light-headedness. Psychiatric/Behavioral: Negative for depression and memory loss. The patient is not nervous/anxious. Allergic/Immunologic: Negative for environmental allergies and persistent infections. Objective:  
 
 
Visit Vitals /69 (BP 1 Location: Right arm, BP Patient Position: At rest) Pulse 80 Temp 98.4 °F (36.9 °C) Resp 22 SpO2 99% Physical Exam: 
General: Well Developed, Well Nourished, No Acute Distress HEENT: pupils equal and round, no abnormalities noted Neck: supple, no JVD, no carotid bruits Heart: S1S2 with RRR without murmurs or gallops Lungs: Clear throughout auscultation bilaterally without adventitious sounds Abd: soft, nontender, nondistended, with good bowel sounds Ext: warm, no edema, calves supple/nontender, pulses 2+ bilaterally Skin: warm and dry Psychiatric: Normal mood and affect Neurologic: Alert and oriented X 3 
 
 
 ECG: NSR with 1st deg AVB and sinus arrhythmia, NSSTTW changes Data Review: pending, see hard chart Echo Results  (Last 48 hours) None CXR Results  (Last 48 hours) None Assessment/Plan:  
Principle Problem:  
Endocarditis (11/12/2018) Admit, Monitor Floor, NPO after midnight, Planned CHRISTINE in the AM, EKG on admission, EKG in the AM.  Continue IV ABX. Active Problems: 
 
Coronary atherosclerosis of native coronary artery (10/22/2015) Continue Brilliant, Lipitor, ACE, BB, and ASA 
 
  S/P AVR (aortic valve replacement) (5/25/2016) CHRISTINE planned, Continue Warfarin COPD (chronic obstructive pulmonary disease) ([facility] [address] (9/19/2018) Continue Steroids, Spiriva, Anticoagulated on Coumadin (9/24/2018) Overview: For aortic valve. Last Anticoagulation visit showed Warfarin 2 mg Mon Wed Fri with 1 mg All other days for a total 10 mg weakly dose. INR target 2.5-3.5.  11/7 and 11/9 dose reduced to 1 mg due to increased steroids. Next INR check was scheduled for 11/13/2018 Torrence Mcardle, NP 
 
ATTENDING ADDENDUM: 
 
Patient seen and examined by me. Agree with above note by physician extender. Key findings are:  No CP or GILLESPIE but confused and probably bacteremic with enterococcus by oconee blood cultures, with history as above s/p mechAVR. Ideally needs a CHRISTINE but confused and belligerent at present, cannot consent to CHRISTINE on his own at present. Treat empirically with ABX for now, consult ID as needed. CV- RRR with 1/6 CHAPO RUSB, + AVR click Lungs- Clear bilaterally, decreased bibasilar Abd- soft, nontender, nondistended Ext- no edema Plan: As above. CHRISTINE tomorrow? Consent may be an issue. Start vancomycin IV for now, supportive care, continue current meds. Hold coumadin for now, recheck INR in AM. Fallon Alas MD 
[address] Cardiology Pager [phone]

## 2018-11-13 ENCOUNTER — APPOINTMENT (OUTPATIENT)
Dept: GENERAL RADIOLOGY | Age: 73
DRG: 314 | End: 2018-11-13
Attending: NURSE PRACTITIONER
Payer: COMMERCIAL

## 2018-11-13 LAB
ANION GAP SERPL CALC-SCNC: 7 MMOL/L (ref 7–16)
ATRIAL RATE: 78 BPM
ATRIAL RATE: 78 BPM
BASOPHILS # BLD: 0.1 K/UL (ref 0–0.2)
BASOPHILS NFR BLD: 1 % (ref 0–2)
BUN SERPL-MCNC: 15 MG/DL (ref 8–23)
CALCIUM SERPL-MCNC: 7.8 MG/DL (ref 8.3–10.4)
CALCULATED P AXIS, ECG09: 27 DEGREES
CALCULATED R AXIS, ECG10: 37 DEGREES
CALCULATED R AXIS, ECG10: 41 DEGREES
CALCULATED T AXIS, ECG11: 67 DEGREES
CALCULATED T AXIS, ECG11: 70 DEGREES
CHLORIDE SERPL-SCNC: 112 MMOL/L (ref 98–107)
CO2 SERPL-SCNC: 20 MMOL/L (ref 21–32)
CREAT SERPL-MCNC: 1.05 MG/DL (ref 0.8–1.5)
DIAGNOSIS, 93000: NORMAL
DIAGNOSIS, 93000: NORMAL
DIFFERENTIAL METHOD BLD: ABNORMAL
EOSINOPHIL # BLD: 0.2 K/UL (ref 0–0.8)
EOSINOPHIL NFR BLD: 2 % (ref 0.5–7.8)
ERYTHROCYTE [DISTWIDTH] IN BLOOD BY AUTOMATED COUNT: 14.6 %
GLUCOSE SERPL-MCNC: 79 MG/DL (ref 65–100)
HCT VFR BLD AUTO: 28.7 % (ref 41.1–50.3)
HGB BLD-MCNC: 9.4 G/DL (ref 13.6–17.2)
IMM GRANULOCYTES # BLD: 0.4 K/UL (ref 0–0.5)
IMM GRANULOCYTES NFR BLD AUTO: 3 % (ref 0–5)
INR PPP: 4.4
LYMPHOCYTES # BLD: 1.4 K/UL (ref 0.5–4.6)
LYMPHOCYTES NFR BLD: 11 % (ref 13–44)
MCH RBC QN AUTO: 30.5 PG (ref 26.1–32.9)
MCHC RBC AUTO-ENTMCNC: 32.8 G/DL (ref 31.4–35)
MCV RBC AUTO: 93.2 FL (ref 79.6–97.8)
MONOCYTES # BLD: 1.3 K/UL (ref 0.1–1.3)
MONOCYTES NFR BLD: 10 % (ref 4–12)
NEUTS SEG # BLD: 9.6 K/UL (ref 1.7–8.2)
NEUTS SEG NFR BLD: 74 % (ref 43–78)
NRBC # BLD: 0 K/UL (ref 0–0.2)
P-R INTERVAL, ECG05: 266 MS
PLATELET # BLD AUTO: 248 K/UL (ref 150–450)
PMV BLD AUTO: 9.8 FL (ref 9.4–12.3)
POTASSIUM SERPL-SCNC: 3.8 MMOL/L (ref 3.5–5.1)
PROTHROMBIN TIME: 39.9 SEC (ref 11.5–14.5)
Q-T INTERVAL, ECG07: 424 MS
Q-T INTERVAL, ECG07: 432 MS
QRS DURATION, ECG06: 84 MS
QRS DURATION, ECG06: 94 MS
QTC CALCULATION (BEZET), ECG08: 430 MS
QTC CALCULATION (BEZET), ECG08: 456 MS
RBC # BLD AUTO: 3.08 M/UL (ref 4.23–5.6)
SODIUM SERPL-SCNC: 139 MMOL/L (ref 136–145)
VENTRICULAR RATE, ECG03: 62 BPM
VENTRICULAR RATE, ECG03: 67 BPM
WBC # BLD AUTO: 12.9 K/UL (ref 4.3–11.1)

## 2018-11-13 PROCEDURE — 74011636637 HC RX REV CODE- 636/637: Performed by: NURSE PRACTITIONER

## 2018-11-13 PROCEDURE — 99152 MOD SED SAME PHYS/QHP 5/>YRS: CPT

## 2018-11-13 PROCEDURE — 87186 SC STD MICRODIL/AGAR DIL: CPT

## 2018-11-13 PROCEDURE — 74011250636 HC RX REV CODE- 250/636: Performed by: FAMILY MEDICINE

## 2018-11-13 PROCEDURE — 74011250636 HC RX REV CODE- 250/636: Performed by: INTERNAL MEDICINE

## 2018-11-13 PROCEDURE — 65660000000 HC RM CCU STEPDOWN

## 2018-11-13 PROCEDURE — 87077 CULTURE AEROBIC IDENTIFY: CPT

## 2018-11-13 PROCEDURE — 77030021668 HC NEB PREFIL KT VYRM -A

## 2018-11-13 PROCEDURE — 87040 BLOOD CULTURE FOR BACTERIA: CPT

## 2018-11-13 PROCEDURE — 77010033678 HC OXYGEN DAILY

## 2018-11-13 PROCEDURE — 74011250636 HC RX REV CODE- 250/636

## 2018-11-13 PROCEDURE — 94760 N-INVAS EAR/PLS OXIMETRY 1: CPT

## 2018-11-13 PROCEDURE — 93005 ELECTROCARDIOGRAM TRACING: CPT | Performed by: NURSE PRACTITIONER

## 2018-11-13 PROCEDURE — 80048 BASIC METABOLIC PNL TOTAL CA: CPT

## 2018-11-13 PROCEDURE — 74011250636 HC RX REV CODE- 250/636: Performed by: NURSE PRACTITIONER

## 2018-11-13 PROCEDURE — 74011000250 HC RX REV CODE- 250: Performed by: INTERNAL MEDICINE

## 2018-11-13 PROCEDURE — 74011250637 HC RX REV CODE- 250/637: Performed by: NURSE PRACTITIONER

## 2018-11-13 PROCEDURE — 93312 ECHO TRANSESOPHAGEAL: CPT

## 2018-11-13 PROCEDURE — C8929 TTE W OR WO FOL WCON,DOPPLER: HCPCS

## 2018-11-13 PROCEDURE — 74011000258 HC RX REV CODE- 258: Performed by: NURSE PRACTITIONER

## 2018-11-13 PROCEDURE — 85025 COMPLETE CBC W/AUTO DIFF WBC: CPT

## 2018-11-13 PROCEDURE — 94640 AIRWAY INHALATION TREATMENT: CPT

## 2018-11-13 PROCEDURE — 71045 X-RAY EXAM CHEST 1 VIEW: CPT

## 2018-11-13 PROCEDURE — 77010033711 HC HIGH FLOW OXYGEN

## 2018-11-13 PROCEDURE — 36415 COLL VENOUS BLD VENIPUNCTURE: CPT

## 2018-11-13 PROCEDURE — 85610 PROTHROMBIN TIME: CPT

## 2018-11-13 PROCEDURE — 74011000250 HC RX REV CODE- 250: Performed by: NURSE PRACTITIONER

## 2018-11-13 PROCEDURE — 87205 SMEAR GRAM STAIN: CPT

## 2018-11-13 RX ORDER — WARFARIN 1 MG/1
2 TABLET ORAL
Status: DISCONTINUED | OUTPATIENT
Start: 2018-11-14 | End: 2018-11-13

## 2018-11-13 RX ORDER — ALBUTEROL SULFATE 0.83 MG/ML
2.5 SOLUTION RESPIRATORY (INHALATION)
Status: DISCONTINUED | OUTPATIENT
Start: 2018-11-13 | End: 2018-11-16 | Stop reason: HOSPADM

## 2018-11-13 RX ORDER — MIDAZOLAM HYDROCHLORIDE 1 MG/ML
.5-2 INJECTION, SOLUTION INTRAMUSCULAR; INTRAVENOUS
Status: DISCONTINUED | OUTPATIENT
Start: 2018-11-13 | End: 2018-11-13 | Stop reason: HOSPADM

## 2018-11-13 RX ORDER — ALBUTEROL SULFATE 0.83 MG/ML
2.5 SOLUTION RESPIRATORY (INHALATION)
Status: DISCONTINUED | OUTPATIENT
Start: 2018-11-13 | End: 2018-11-13

## 2018-11-13 RX ORDER — VANCOMYCIN/0.9 % SOD CHLORIDE 1.5G/250ML
1500 PLASTIC BAG, INJECTION (ML) INTRAVENOUS EVERY 12 HOURS
Status: DISCONTINUED | OUTPATIENT
Start: 2018-11-13 | End: 2018-11-13

## 2018-11-13 RX ORDER — VANCOMYCIN 2 GRAM/500 ML IN 0.9 % SODIUM CHLORIDE INTRAVENOUS
2000 ONCE
Status: DISCONTINUED | OUTPATIENT
Start: 2018-11-13 | End: 2018-11-13

## 2018-11-13 RX ORDER — LIDOCAINE HYDROCHLORIDE 20 MG/ML
15 SOLUTION OROPHARYNGEAL AS NEEDED
Status: DISCONTINUED | OUTPATIENT
Start: 2018-11-13 | End: 2018-11-16 | Stop reason: HOSPADM

## 2018-11-13 RX ORDER — WARFARIN 1 MG/1
1 TABLET ORAL
Status: DISCONTINUED | OUTPATIENT
Start: 2018-11-13 | End: 2018-11-13

## 2018-11-13 RX ORDER — FENTANYL CITRATE 50 UG/ML
25-75 INJECTION, SOLUTION INTRAMUSCULAR; INTRAVENOUS
Status: DISCONTINUED | OUTPATIENT
Start: 2018-11-13 | End: 2018-11-13 | Stop reason: HOSPADM

## 2018-11-13 RX ADMIN — TIOTROPIUM BROMIDE 18 MCG: 18 CAPSULE ORAL; RESPIRATORY (INHALATION) at 07:34

## 2018-11-13 RX ADMIN — PREDNISONE 10 MG: 5 TABLET ORAL at 09:30

## 2018-11-13 RX ADMIN — TICAGRELOR 90 MG: 90 TABLET ORAL at 21:42

## 2018-11-13 RX ADMIN — Medication 10 ML: at 05:31

## 2018-11-13 RX ADMIN — VENLAFAXINE HYDROCHLORIDE 75 MG: 75 CAPSULE, EXTENDED RELEASE ORAL at 21:42

## 2018-11-13 RX ADMIN — MIDAZOLAM HYDROCHLORIDE 1 MG: 1 INJECTION, SOLUTION INTRAMUSCULAR; INTRAVENOUS at 10:28

## 2018-11-13 RX ADMIN — ALBUTEROL SULFATE 2.5 MG: 2.5 SOLUTION RESPIRATORY (INHALATION) at 02:00

## 2018-11-13 RX ADMIN — PERFLUTREN 1 ML: 6.52 INJECTION, SUSPENSION INTRAVENOUS at 13:00

## 2018-11-13 RX ADMIN — ALBUTEROL SULFATE 2.5 MG: 2.5 SOLUTION RESPIRATORY (INHALATION) at 12:00

## 2018-11-13 RX ADMIN — LIDOCAINE HYDROCHLORIDE 15 ML: 20 SOLUTION ORAL; TOPICAL at 10:00

## 2018-11-13 RX ADMIN — FENTANYL CITRATE 25 MCG: 50 INJECTION, SOLUTION INTRAMUSCULAR; INTRAVENOUS at 10:19

## 2018-11-13 RX ADMIN — PANTOPRAZOLE SODIUM 40 MG: 40 TABLET, DELAYED RELEASE ORAL at 09:31

## 2018-11-13 RX ADMIN — FAMOTIDINE 40 MG: 20 TABLET ORAL at 17:33

## 2018-11-13 RX ADMIN — AMPICILLIN SODIUM 2 G: 2 INJECTION, POWDER, FOR SOLUTION INTRAMUSCULAR; INTRAVENOUS at 20:41

## 2018-11-13 RX ADMIN — METOPROLOL SUCCINATE 25 MG: 25 TABLET, EXTENDED RELEASE ORAL at 09:31

## 2018-11-13 RX ADMIN — Medication 5 ML: at 14:00

## 2018-11-13 RX ADMIN — ATORVASTATIN CALCIUM 40 MG: 40 TABLET, FILM COATED ORAL at 09:31

## 2018-11-13 RX ADMIN — ASPIRIN 81 MG: 81 TABLET, COATED ORAL at 09:30

## 2018-11-13 RX ADMIN — TICAGRELOR 90 MG: 90 TABLET ORAL at 09:29

## 2018-11-13 RX ADMIN — TOPIRAMATE 100 MG: 25 TABLET, FILM COATED ORAL at 09:30

## 2018-11-13 RX ADMIN — Medication 10 ML: at 21:43

## 2018-11-13 RX ADMIN — FENTANYL CITRATE 25 MCG: 50 INJECTION, SOLUTION INTRAMUSCULAR; INTRAVENOUS at 10:30

## 2018-11-13 RX ADMIN — LEVOTHYROXINE SODIUM 25 MCG: 50 TABLET ORAL at 05:30

## 2018-11-13 RX ADMIN — TAMSULOSIN HYDROCHLORIDE 0.4 MG: 0.4 CAPSULE ORAL at 09:31

## 2018-11-13 RX ADMIN — AMPICILLIN SODIUM 2 G: 2 INJECTION, POWDER, FOR SOLUTION INTRAMUSCULAR; INTRAVENOUS at 16:58

## 2018-11-13 RX ADMIN — LISINOPRIL 2.5 MG: 5 TABLET ORAL at 09:30

## 2018-11-13 RX ADMIN — MIDAZOLAM HYDROCHLORIDE 1 MG: 1 INJECTION, SOLUTION INTRAMUSCULAR; INTRAVENOUS at 10:19

## 2018-11-13 RX ADMIN — FAMOTIDINE 40 MG: 20 TABLET ORAL at 09:30

## 2018-11-13 RX ADMIN — CEFTRIAXONE SODIUM 2 G: 2 INJECTION, POWDER, FOR SOLUTION INTRAMUSCULAR; INTRAVENOUS at 17:32

## 2018-11-13 NOTE — PROGRESS NOTES
Pt having an increased shortness of breath and difficulty breathing, Korin Holland NP notified and ordered a STAT chest X-ray.

## 2018-11-13 NOTE — PROGRESS NOTES
Pt admitted to 3rd floor tele for COPD exacerbation. CM met with pt to discuss CM needs & DCP. Pt is A&Ox4. Pt is partially dependent at home with all ADLS. Pt lives with GF. Pt has walker, O2 (continuous portabel tank & concentrator- [employer]) no further DME needs. Pt has no difficulty with obtaining medication. Pt relies on SO for transport. DCP home with [address] vs rehab. Pt states Lisa Carolina had been assisting pt with DCP to rehab. Pt will nee PT /OT angy. CM to continue to monitor for dc to SNF. Care Management Interventions PCP Verified by CM: Yes Mode of Transport at Discharge: Other (see comment) Transition of Care Consult (CM Consult): Discharge Planning, SNF Discharge Durable Medical Equipment: No 
Physical Therapy Consult: No 
Occupational Therapy Consult: No 
Speech Therapy Consult: No 
Current Support Network: Lives with Spouse, Own Home Confirm Follow Up Transport: Family Plan discussed with Pt/Family/Caregiver: Yes Freedom of Choice Offered: Yes Discharge Location Discharge Placement: Rehab Unit Subacute

## 2018-11-13 NOTE — PROGRESS NOTES
TRANSFER - IN REPORT: 
 
Verbal report received from Jf Stokes RN(name) on Jacquelin Gimenez  being received from CCL(unit) for routine progression of care Report consisted of patients Situation, Background, Assessment and  
Recommendations(SBAR). Information from the following report(s) SBAR and Procedure Summary was reviewed with the receiving nurse. Opportunity for questions and clarification was provided. Assessment completed upon patients arrival to unit and care assumed.

## 2018-11-13 NOTE — PROGRESS NOTES
Bedside and Verbal shift change report given to Jake Infante RN (oncoming nurse) by self Kelin Bassett nurse). Report included the following information SBAR, Kardex, MAR and Recent Results.

## 2018-11-13 NOTE — PROGRESS NOTES
Patient complaining of increased shortness of breath. Spoke with Evan Gaines NP. Ordered albuterol q4 prn. Albuterol contraindicated with advair diskus. Spoke with wife at bedside, stated patient has had albuterol before with no adverse reactions. Will continue to monitor.

## 2018-11-13 NOTE — PROGRESS NOTES
Skin epidermis is thin, dry and intact with BUE and intergluteal cleft ecchymosis. Sacrum and heals are non red and intact.

## 2018-11-13 NOTE — PROGRESS NOTES
Pharmacokinetic Consult to Pharmacist 
 
Jennifer Hilliard is a 68 y.o. male being treated for enterococcus faecalis bacteremia with possibility of endocarditis with Vancomycin. A CHRISTINE is pending today to rule out endocarditis Weight: 89 kg (196 lb 1.6 oz) Lab Results Component Value Date/Time BUN 15 11/13/2018 12:03 AM  
 Creatinine 1.05 11/13/2018 12:03 AM  
 WBC 12.9 (H) 11/13/2018 07:04 AM  
  
Estimated Creatinine Clearance: 68.9 mL/min (based on SCr of 1.05 mg/dL). CULTURES: 
Pending Per report from [facility] in the chart, patient had e faecalis in blood that was susceptible to vancomycin. Day 1 of vancomycin. Goal trough is 15-20. Vancomycin dose initiated at 2000 mg X 1, then 1500 mg Q12H. Plan trough prior to the 4th dose. Will continue to follow patient. Thank you, 
Jamee Domínguez, PharmD Clinical Pharmacist 
[phone]

## 2018-11-13 NOTE — PROGRESS NOTES
[facility] CARDIOLOGY PROGRESS NOTE 
      
 
11/13/2018 11:40 AM 
 
Admit Date: 11/12/2018 Subjective: 68 yom with hx of mech AV presented with fever and elevated WBC, positive blood cultures. CHRISTINE today showed signs concerning for vegetation on the ventricular side of the mechanical AV. No clear evidence of perivalvular abscess. ROS: 
Cardiovascular:  As noted above Objective:  
  
Vitals:  
 11/13/18 1047 11/13/18 1055 11/13/18 1100 11/13/18 1115 BP: 111/58 112/61 111/57 104/56 Pulse: 64 70 73 61 Resp: 24 30 28 25 Temp:      
SpO2: 97% 97% 95% 96% Weight:      
 
 
 
Physical Exam: 
General: Well Developed, Well Nourished, No Acute Distress, Alert & Oriented x 3, Appropriate mood Neck: supple, no JVD Heart: S1S2 with RRR without murmurs or gallops Lungs: wheezing throughout Abd: soft, nontender, nondistended, with good bowel sounds Ext: no edema bilaterally Right groin: clean, dry, and intact without bruits, hematoma, or bleeding Skin: warm and dry Data Review:  
Recent Labs 11/13/18 
8660 11/13/18 
0003 11/12/18 
2212 11/12/18 2053 NA  --  139  --   --   
K  --  3.8  --   --   
MG  --   --   --  2.1 BUN  --  15  --   --   
CREA  --  1.05  --   --   
GLU  --  79  --   --   
WBC 12.9*  --   --  18.5* HGB 9.4*  --   --  11.0*  
HCT 28.7*  --   --  31.4*   --   --  230 INR 4.4*  --  4.0*  -- No results for input(s): Ryan Gonzales in the last 72 hours. Assessment/Plan: Active Problems: 
  Coronary atherosclerosis of native coronary artery (10/22/2015) Mixed hyperlipidemia (10/22/2015) Essential hypertension (10/22/2015) S/P AVR (aortic valve replacement) (5/25/2016) [facility] (9/19/2018) Acquired hypothyroidism (9/23/2018) Anticoagulated on Coumadin (9/24/2018) Overview: For aortic valve. 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 
3) CAD - continue ASA/Statin/todd Miller MD 
11/13/2018 11:40 AM

## 2018-11-13 NOTE — PROGRESS NOTES
Verbal bedside report given to Teressa Wong, oncoming RN. Patient's situation, background, assessment and recommendations provided. Opportunity for questions provided. Oncoming RN assumed care of patient.

## 2018-11-13 NOTE — PROGRESS NOTES
Warfarin dosing per pharmacist 
 
Libertad Kelly is a 68 y.o. male. Weight: 89 kg (196 lb 1.6 oz) Indication:  Mechanical AVR Goal INR:  2.5 to 3.5 per Levine, Susan. \[facility] and Outlook.\"" cardiology coumadin clinic notes from 11/9/18 at which time INR was 4.3 Home dose:  2 mg on MWF, 1 mg all other days of the week per Levine, Susan. \Hospital Has a New Name and Outlook.\"" cardiology note from 11/9/18 Risk factors or significant drug interactions:  Elevated INR on admission, on ASA and ticagrelor Other anticoagulants:  N/A Daily Monitoring Date  INR     Warfarin dose HGB              Notes 11/12  4.0  ?  11.0 
11/13  4.4  Hold  9.4 Pharmacy consulted to assist dosing warfarin in patient with past medical history of mechanical AVR. Patient presented with elevated INR and plans for CHRISTINE today. Will hold warfarin for now. Check INR daily. Plan to resume warfarin once INR trending back downwards and closer to upper end of goal range at 3.5. Thank you, 
Dionna Langley, PharmD Clinical Pharmacist 
[phone]

## 2018-11-13 NOTE — PROGRESS NOTES
Bedside and Verbal shift change report given to self (oncoming nurse) by Damián Morales RN (offgoing nurse). Report included the following information SBAR, Kardex, MAR and Recent Results.

## 2018-11-13 NOTE — CONSULTS
HOSPITALIST INITIAL CONSULT NOTE    NAME:  Sagar Howard   Age:  68 y.o.  :   1945   MRN:   [id number]  PCP: Paulino Benton MD  Consulting MD:  Treatment Team: Attending Provider: Regina Cook MD; Consulting Provider: Rock Love MD    REASON FOR CONSULT: sepsis    HISTORY OF PRESENT ILLNESS AT TIME OF CONSULT:   Sagar Howard is a 68 y.o. male with a past medical history of COPD, CAD with NSTEMI in 10/18 s/p PCI, AS with [facility] valve replacement on Warfarin and Brilinta, HTN who presents as a transfer from [facility] to the cardiology service with hypotension, bacteremia, and concern for endocarditis/cardiac infection. We have been consulted for worsening confusion, and hypotension. The patient is confused but pleasant. Denies any pain, SOB, nausea currently. REVIEW OF SYSTEMS: Comprehensive ROS performed and negative except as stated in HPI. Past Medical History:   Diagnosis Date    Aortic stenosis 10/22/2015    Chest pain 10/22/2015    COPD (chronic obstructive pulmonary disease) (HCC)     Coronary artery disease     Coronary atherosclerosis of native coronary artery 10/22/2015    [facility]    right hemispheric    Essential hypertension 10/22/2015    Mixed hyperlipidemia 10/22/2015    Tobacco use disorder 10/22/2015        Past Surgical History:   Procedure Laterality Date    HX CORONARY ARTERY BYPASS GRAFT         Prior to Admission Medications   Prescriptions Last Dose Informant Patient Reported? Taking? HYDROcodone-acetaminophen (NORCO) 7.5-325 mg per tablet 2018 at Unknown time  Yes Yes   Sig: Take  by mouth. MAGNESIUM PO 2018 at Unknown time  Yes Yes   Sig: Take  by mouth. aspirin delayed-release 81 mg tablet 2018 at Unknown time  Yes Yes   Sig: Take  by mouth daily. atorvastatin (LIPITOR) 40 mg tablet 2018 at Unknown time  Yes Yes   Sig: Take  by mouth daily. famotidine (PEPCID) 40 mg tablet 2018 at Unknown time  No Yes   Sig: Take 1 Tab by mouth two (2) times a day. fish oil-omega-3 fatty acids 340-1,000 mg capsule 2018 at Unknown time  Yes Yes   Sig: Take 1 Cap by mouth daily. levothyroxine (SYNTHROID) 25 mcg tablet 2018 at Unknown time  Yes Yes   Sig: Take  by mouth Daily (before breakfast). lisinopril (PRINIVIL, ZESTRIL) 2.5 mg tablet 2018 at Unknown time  No Yes   Sig: Take 1 Tab by mouth daily. metoprolol succinate (TOPROL-XL) 25 mg XL tablet 2018 at Unknown time  No Yes   Sig: Take 1 Tab by mouth daily. nitroglycerin (NITROSTAT) 0.4 mg SL tablet   No No   Si Tab by SubLINGual route every five (5) minutes as needed for Chest Pain. Up to 3 doses. omeprazole (PRILOSEC) 40 mg capsule 2018 at Unknown time  Yes Yes   Sig: Take 40 mg by mouth daily. potassium 99 mg tablet 2018 at Unknown time  Yes Yes   Sig: Take 99 mg by mouth daily. predniSONE (DELTASONE) 10 mg tablet 2018 at Unknown time  Yes Yes   Sig: Take  by mouth daily (with breakfast). tamsulosin (FLOMAX) 0.4 mg capsule 2018 at Unknown time  Yes Yes   Sig: Take 0.4 mg by mouth daily. ticagrelor (BRILINTA) 90 mg tablet 2018 at Unknown time  No Yes   Sig: Take 1 Tab by mouth every twelve (12) hours every twelve (12) hours. tiotropium (SPIRIVA) 18 mcg inhalation capsule 2018 at Unknown time  Yes Yes   Sig: Take 1 Cap by inhalation daily. topiramate (TOPAMAX) 100 mg tablet 2018 at Unknown time  Yes Yes   Sig: Take 100 mg by mouth daily. venlafaxine-SR (EFFEXOR-XR) 75 mg capsule 2018 at Unknown time  Yes Yes   Sig: Take 75 mg by mouth nightly. vitamin a-vitamin c-vit e-min (OCUVITE) tablet 2018 at Unknown time  Yes Yes   Sig: Take 1 Tab by mouth daily. warfarin (COUMADIN) 1 mg tablet 2018 at Unknown time  Yes Yes   Sig: Take 1 mg by mouth daily.  Brand name   warfarin (COUMADIN) 2 mg tablet 2018  Yes No   Sig: Take 2 mg by mouth daily. Facility-Administered Medications: None       Allergies   Allergen Reactions    Advair Diskus [Fluticasone-Salmeterol] Unknown (comments)     rash    Prozac [Fluoxetine] Unable to Obtain       FAMILY HISTORY: Reviewed. Negative except No family history on file. Social History     Tobacco Use    Smoking status: Former Smoker     Packs/day: 0.25     Last attempt to quit: 2018     Years since quittin.1    Smokeless tobacco: Never Used    Tobacco comment: trying to quit   Substance Use Topics    Alcohol use: No         Objective:     Visit Vitals  /69 (BP 1 Location: Right arm, BP Patient Position: At rest)   Pulse 80   Temp 98.4 °F (36.9 °C)   Resp 22   SpO2 99%      Temp (24hrs), Av.4 °F (36.9 °C), Min:98.4 °F (36.9 °C), Max:98.4 °F (36.9 °C)    Oxygen Therapy  O2 Sat (%): 99 % (18)  Physical Exam:  General:    The patient is a pleasantly confused elderly male in no acute distress. Head:   Normocephalic/atraumatic. Eyes:  No palpebral pallor or scleral icterus. ENT:  External auricular and nasal exam within normal limits. Mucous membranes are dry  Neck:  Supple, non-tender, no JVD. Lungs:   Clear to auscultation bilaterally without wheezes or crackles. No respiratory distress or accessory muscle use. Heart:   Regular rate and rhythm, without murmurs, rubs, or gallops. Abdomen:   Soft, non-tender, non-distended with normoactive bowel sounds. Genitourinary: No tenderness over the bladder or bilateral CVAs. Extremities: Without clubbing, cyanosis, or edema. Skin:     Normal color, texture, and turgor. No rashes, lesions, or jaundice. Pulses: Radial and dorsalis pedis pulses present 2+ bilaterally. Capillary refill <2s. Neurologic: CN II-XII grossly intact and symmetrical.     Moving all four extremities well with no focal deficits.   Psychiatric: Pleasant demeanor, appropriate affect, with occasional inappropriate responses to questions (\"I'm not going to give these folks anything! \"). Alert and oriented x 3    Data Review:   Recent Results (from the past 24 hour(s))   EKG, 12 LEAD, INITIAL    Collection Time: 11/12/18  7:56 PM   Result Value Ref Range    Ventricular Rate 67 BPM    Atrial Rate 78 BPM    P-R Interval 266 ms    QRS Duration 94 ms    Q-T Interval 432 ms    QTC Calculation (Bezet) 456 ms    Calculated P Axis 27 degrees    Calculated R Axis 41 degrees    Calculated T Axis 70 degrees    Diagnosis       Sinus rhythm with marked sinus arrhythmia with 1st degree A-V block  Incomplete right bundle branch block  Nonspecific ST abnormality  Abnormal ECG  When compared with ECG of 22-OCT-2018 17:16,  Premature atrial complexes are no longer Present  OR interval has increased     CBC W/O DIFF    Collection Time: 11/12/18  8:53 PM   Result Value Ref Range    WBC 18.5 (H) 4.3 - 11.1 K/uL    RBC 3.50 (L) 4.23 - 5.6 M/uL    HGB 11.0 (L) 13.6 - 17.2 g/dL    HCT 31.4 (L) 41.1 - 50.3 %    MCV 89.7 79.6 - 97.8 FL    MCH 31.4 26.1 - 32.9 PG    MCHC 35.0 31.4 - 35.0 g/dL    RDW 14.6 %    PLATELET 457 225 - 837 K/uL    MPV 10.2 9.4 - 12.3 FL    ABSOLUTE NRBC 0.00 0.0 - 0.2 K/uL   MAGNESIUM    Collection Time: 11/12/18  8:53 PM   Result Value Ref Range    Magnesium 2.1 1.8 - 2.4 mg/dL       Imaging /Procedures /Studies:  No results found. 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 Recommend consult to ID depending on CHRISTINE findings. IVF as needed to maintain blood pressure. Leukocytosis (10/22/201    Follow CBC. Coronary atherosclerosis of native coronary artery (10/22/2015)    Per cardiology      Essential hypertension (10/22/2015)    Stable. COPD (chronic obstructive pulmonary disease) ([facility] (9/19/2018)    Stable.       Anticoagulated on Coumadin (9/24/2018)    Stable, per cardiology      Mixed hyperlipidemia (10/22/2015)    Stable. S/P AVR (aortic valve replacement) (5/25/2016)    Per above      Acquired hypothyroidism (9/23/2018)    Recommend continue home meds    Thank you for the opportunity to participate in Mr. Smith Oheneida care. Will sign off. Please don't hesitate to call if you have any questions.     Signed By: Dorcas Davis MD     November 12, 2018

## 2018-11-13 NOTE — PROGRESS NOTES
TRANSFER - IN REPORT: 
 
Verbal report received from [place]) on Nuzhat Schaefer  being received from cath lab(unit) for routine progression of care Report consisted of patients Situation, Background, Assessment and  
Recommendations(SBAR). Information from the following report(s) Procedure Summary was reviewed with the receiving nurse. Opportunity for questions and clarification was provided. Assessment completed upon patients arrival to unit and care assumed.

## 2018-11-13 NOTE — PROGRESS NOTES
Problem: Falls - Risk of 
Goal: *Absence of Falls Document Shelia Fraser Fall Risk and appropriate interventions in the flowsheet. Outcome: Progressing Towards Goal 
Fall Risk Interventions: 
Mobility Interventions: Bed/chair exit alarm, Patient to call before getting OOB, PT Consult for mobility concerns, PT Consult for assist device competence Mentation Interventions: Bed/chair exit alarm, Door open when patient unattended, Family/sitter at bedside Medication Interventions: Evaluate medications/consider consulting pharmacy, Patient to call before getting OOB, Teach patient to arise slowly Elimination Interventions: Bed/chair exit alarm, Call light in reach, Patient to call for help with toileting needs, Toilet paper/wipes in reach, Urinal in reach

## 2018-11-13 NOTE — ROUTINE PROCESS
TRANSFER - OUT REPORT: 
 
CHRISTINE Dr. Jessica Fry Versed 2mg, fenttanyl 50mcg Vegetation seen on aortic valve, also pleural effusion Pt may eat and drink again at 1230 Verbal report given to Thomas Jose RN(name) on Tiff Mason  being transferred to cpru(unit) for routine progression of care Report consisted of patients Situation, Background, Assessment and  
Recommendations(SBAR). Information from the following report(s) Procedure Summary was reviewed with the receiving nurse. Lines:  
Peripheral IV Anterior; Left Antecubital (Active) Site Assessment Clean, dry, & intact 11/13/2018  8:00 AM  
Phlebitis Assessment 0 11/13/2018  8:00 AM  
Infiltration Assessment 0 11/13/2018  8:00 AM  
Dressing Status Clean, dry, & intact 11/13/2018  8:00 AM  
Dressing Type Tape;Transparent 11/13/2018  8:00 AM  
Hub Color/Line Status Patent; Flushed 11/13/2018  8:00 AM  
Alcohol Cap Used No 11/13/2018  8:00 AM  
  
 
Opportunity for questions and clarification was provided. Patient transported with: 
 Registered Nurse Tech

## 2018-11-13 NOTE — ROUTINE PROCESS
Cath Lab Transfer In Transferred from tele Transferred to : Cath Lab Procedure Room 2 Reason for Transfer: CHRISTINE Attending physician: Home Leo Patient Assessment Patient received into procedure room. No acute distress noted or verbalized. Procedural prep completed. Iv accesses assessed for patency. Review of pertinent labs and allergies completed. Noted presence of current History & Physical, updated within the previous 24 hours. Consent signed.

## 2018-11-13 NOTE — CONSULTS
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 BC positive 11/10, CHRISTINE with small vegetation on mechanical aortic valve. Source of bacteremia unknown  · Treated [facility] BC+10/11/18, treated with 7 days IV/7 days PO   · AMS: delirium vs other  · COPD: on chronic prednisone   · Hx NSTEMI s/p PCI 10/2018    Plan:   · Discontinue Vancomycin  · Start Ampicillin continuous infusion with Ceftriaxone 2g IV Q12h for synergy: anticipate prolonged course  · Repeat BC  · Follow mental status, if not improved over 48 hrs, consider brain imaging-he is at risk for embolic event  · Check am labs, to include LFTs    Anti-infectives:   · Ampicillin  · Ceftriaxone  · Vanc 11/12-11/13    Subjective:   Date of Consultation:  November 13, 2018  Referring Physician: Dr Nellie Swann    Patient is a 68 y.o. male transferred from [facility] where he presented on 11/10/18 with AMS, fever, chills, SOB. He was noted to have E.feacalis in the blood as well as arrhythmias-he was evaluated by the cardiologist and transferred to [facility] for further management. He was recently admitted to [facility] 10/11-10/17 with E.feacalis bacteremia and treated with 7 days of IV therapy, and discharged on 7 days of Amoxicillin-this was changed to Keflex around EOT by his PCP for a possible UTI. Repeat BC were done while he was on oral therapy on 10/22/18 (unknown reason) and were negative. Echo was not done. Pts family that is at bedside reports that he has been unwell, weak, with decreased appetite for the past month or so. CHRISTINE was completed today and noted a small vegetation on the prosthetic mechanical aortic valve. No repeat BC have been done. Pt has been started on Vancomycin, ID consulted to make further treatment recommendations. Cardiology has deemed pt to be a poor operative candidate. Presently pt remains confused, he is oriented to self and family members. [de-identified] HPi obtained from family and chart review. Patient Active Problem List   Diagnosis Code    Aortic stenosis I35.0    Coronary atherosclerosis of native coronary artery I25.10    Mixed hyperlipidemia E78.2    Tobacco use disorder F17.200    Essential hypertension I10    S/P AVR (aortic valve replacement) Z95.2    Long term (current) use of anticoagulants Z79.01    S/P CABG x 2 Z95.1    NSTEMI (non-ST elevated myocardial infarction) ([facility] [address] I21.4    COPD (chronic obstructive pulmonary disease) ([facility]) J44.9    Acquired hypothyroidism E03.9    Anticoagulated on Coumadin Z51.81, Z79.01    Current chronic use of systemic steroids Z79.52    Weakness R53.1    Leukocytosis D72.829    Endocarditis I38     Past Medical History:   Diagnosis Date    Aortic stenosis 10/22/2015    Chest pain 10/22/2015    COPD (chronic obstructive pulmonary disease) ([facility] [address]     Coronary artery disease     Coronary atherosclerosis of native coronary artery 10/22/2015    CVA (cerebrovascular accident) [facility])     right hemispheric    Essential hypertension 10/22/2015    Mixed hyperlipidemia 10/22/2015    Tobacco use disorder 10/22/2015      No family history on file. Social History     Tobacco Use    Smoking status: Former Smoker     Packs/day: 0.25     Last attempt to quit: 2018     Years since quittin.1    Smokeless tobacco: Never Used    Tobacco comment: trying to quit   Substance Use Topics    Alcohol use: No     Past Surgical History:   Procedure Laterality Date    HX CORONARY ARTERY BYPASS GRAFT        Prior to Admission medications    Medication Sig Start Date End Date Taking? Authorizing Provider   predniSONE (DELTASONE) 10 mg tablet Take  by mouth daily (with breakfast). Yes Provider, Historical   metoprolol succinate (TOPROL-XL) 25 mg XL tablet Take 1 Tab by mouth daily. 10/27/18  Yes ASHANTI Chanel   famotidine (PEPCID) 40 mg tablet Take 1 Tab by mouth two (2) times a day. 10/26/18  Yes ASHANTI Mcintosh   ticagrelor (BRILINTA) 90 mg tablet Take 1 Tab by mouth every twelve (12) hours every twelve (12) hours. 10/26/18  Yes Lucita Ferguson PA   vitamin a-vitamin c-vit e-min (OCUVITE) tablet Take 1 Tab by mouth daily. Yes Provider, Historical   omeprazole (PRILOSEC) 40 mg capsule Take 40 mg by mouth daily. Yes Provider, Historical   lisinopril (PRINIVIL, ZESTRIL) 2.5 mg tablet Take 1 Tab by mouth daily. 9/22/18  Yes Adama VAZ, NP   fish oil-omega-3 fatty acids 340-1,000 mg capsule Take 1 Cap by mouth daily. Yes Provider, Historical   warfarin (COUMADIN) 1 mg tablet Take 1 mg by mouth daily. Brand name   Yes Provider, Historical   potassium 99 mg tablet Take 99 mg by mouth daily. Yes Provider, Historical   MAGNESIUM PO Take  by mouth. Yes Provider, Historical   tiotropium (SPIRIVA) 18 mcg inhalation capsule Take 1 Cap by inhalation daily. Yes Provider, Historical   tamsulosin (FLOMAX) 0.4 mg capsule Take 0.4 mg by mouth daily. Yes Provider, Historical   topiramate (TOPAMAX) 100 mg tablet Take 100 mg by mouth daily. Yes Provider, Historical   venlafaxine-SR (EFFEXOR-XR) 75 mg capsule Take 75 mg by mouth nightly. Yes Provider, Historical   aspirin delayed-release 81 mg tablet Take  by mouth daily. Yes Provider, Historical   atorvastatin (LIPITOR) 40 mg tablet Take  by mouth daily. Yes Provider, Historical   HYDROcodone-acetaminophen (NORCO) 7.5-325 mg per tablet Take  by mouth. Yes Provider, Historical   levothyroxine (SYNTHROID) 25 mcg tablet Take  by mouth Daily (before breakfast). Yes Provider, Historical   warfarin (COUMADIN) 2 mg tablet Take 2 mg by mouth daily. Provider, Historical   nitroglycerin (NITROSTAT) 0.4 mg SL tablet 1 Tab by SubLINGual route every five (5) minutes as needed for Chest Pain. Up to 3 doses.  9/21/18   Leonard Sours, NP       Allergies   Allergen Reactions    Advair Diskus [Fluticasone-Salmeterol] Unknown (comments)     rash    Prozac [Fluoxetine] Unable to Obtain        Review of Systems:  A comprehensive review of systems was negative except for that written in the History of Present Illness. Objective:     Visit Vitals  /56   Pulse 61   Temp 98.1 °F (36.7 °C)   Resp 25   Wt 89 kg (196 lb 1.6 oz)   SpO2 98%   BMI 29.82 kg/m²     Temp (24hrs), Av.9 °F (37.2 °C), Min:98.1 °F (36.7 °C), Max:100.4 °F (38 °C)       Lines:  Peripheral IV:   Left arm intact    Physical Exam:    General:  Alert, cooperative, chronically ill appearing   Eyes:  Sclera anicteric. Pupils equally round and reactive to light. Mouth/Throat: Mucous membranes normal, oral pharynx clear   Neck: Supple   Lungs:   Clear to auscultation bilaterally, BiPAP   CV:  Regular rate and rhythm, no murmur, click, rub or gallop   Abdomen:   Soft, non-tender. bowel sounds normal. non-distended   Extremities: No cyanosis or edema   Skin: Skin color, texture, turgor normal. no acute rash or lesions   Lymph nodes: Cervical and supraclavicular normal   Musculoskeletal: Dario UE edema, scattered bruising/echymotic areas   Lines/Devices:  Intact, no erythema, drainage or tenderness   Psych: Alert and oriented 1-2, agitated at times       Data Review:     CBC:  Recent Labs     18  0704 18  2053   WBC 12.9* 18.5*   GRANS 74  --    MONOS 10  --    EOS 2  --    ANEU 9.6*  --    ABL 1.4  --    HGB 9.4* 11.0*   HCT 28.7* 31.4*    230       BMP:  Recent Labs     18  0003   CREA 1.05   BUN 15      K 3.8   *   CO2 20*   AGAP 7   GLU 79       LFTS:  No results for input(s): TBILI, ALT, SGOT, AP, TP, ALB in the last 72 hours. Microbiology:     All Micro Results     None          Imagin18 CHRISTINE  SUMMARY:    -  Left ventricle: Systolic function was normal. Ejection fraction was  estimated in the range of 55 % to 60 %. There were no regional wall motion  abnormalities. -  Aortic valve:  There was a possible, small vegetation. -  Mitral valve: There was mild regurgitation. There was no evidence for  vegetation.     Signed By: Nataly Ambrose NP     November 13, 2018

## 2018-11-13 NOTE — PROGRESS NOTES
Problem: Falls - Risk of 
Goal: *Absence of Falls Document Debria Check Fall Risk and appropriate interventions in the flowsheet. Outcome: Progressing Towards Goal 
Fall Risk Interventions: Pt progressing towards goal. No falls since admission. Bed low and locked. Call light within reach. Side rails x 2. Gripper socks applied. Personal belongings within reach. Pt verbalizes understanding to call for assistance. Mobility Interventions: Bed/chair exit alarm Mentation Interventions: Bed/chair exit alarm Medication Interventions: Patient to call before getting OOB Elimination Interventions: Call light in reach

## 2018-11-14 LAB
ALBUMIN SERPL-MCNC: 2.1 G/DL (ref 3.2–4.6)
ALBUMIN/GLOB SERPL: 0.6 {RATIO} (ref 1.2–3.5)
ALP SERPL-CCNC: 147 U/L (ref 50–136)
ALT SERPL-CCNC: 42 U/L (ref 12–65)
ANION GAP SERPL CALC-SCNC: 9 MMOL/L (ref 7–16)
AST SERPL-CCNC: 40 U/L (ref 15–37)
BASOPHILS # BLD: 0 K/UL (ref 0–0.2)
BASOPHILS NFR BLD: 0 % (ref 0–2)
BILIRUB DIRECT SERPL-MCNC: 0.3 MG/DL
BILIRUB SERPL-MCNC: 0.7 MG/DL (ref 0.2–1.1)
BUN SERPL-MCNC: 12 MG/DL (ref 8–23)
CALCIUM SERPL-MCNC: 7.4 MG/DL (ref 8.3–10.4)
CHLORIDE SERPL-SCNC: 114 MMOL/L (ref 98–107)
CO2 SERPL-SCNC: 17 MMOL/L (ref 21–32)
CREAT SERPL-MCNC: 0.82 MG/DL (ref 0.8–1.5)
CREAT SERPL-MCNC: 0.84 MG/DL (ref 0.8–1.5)
DIFFERENTIAL METHOD BLD: ABNORMAL
EOSINOPHIL # BLD: 0.2 K/UL (ref 0–0.8)
EOSINOPHIL NFR BLD: 2 % (ref 0.5–7.8)
ERYTHROCYTE [DISTWIDTH] IN BLOOD BY AUTOMATED COUNT: 14.6 %
GLOBULIN SER CALC-MCNC: 3.3 G/DL (ref 2.3–3.5)
GLUCOSE SERPL-MCNC: 75 MG/DL (ref 65–100)
HCT VFR BLD AUTO: 29.2 % (ref 41.1–50.3)
HGB BLD-MCNC: 9.3 G/DL (ref 13.6–17.2)
IMM GRANULOCYTES # BLD: 0.4 K/UL (ref 0–0.5)
IMM GRANULOCYTES NFR BLD AUTO: 3 % (ref 0–5)
INR PPP: 4.1
LYMPHOCYTES # BLD: 1.6 K/UL (ref 0.5–4.6)
LYMPHOCYTES NFR BLD: 13 % (ref 13–44)
MCH RBC QN AUTO: 30.2 PG (ref 26.1–32.9)
MCHC RBC AUTO-ENTMCNC: 31.8 G/DL (ref 31.4–35)
MCV RBC AUTO: 94.8 FL (ref 79.6–97.8)
MONOCYTES # BLD: 1 K/UL (ref 0.1–1.3)
MONOCYTES NFR BLD: 8 % (ref 4–12)
NEUTS SEG # BLD: 8.7 K/UL (ref 1.7–8.2)
NEUTS SEG NFR BLD: 74 % (ref 43–78)
NRBC # BLD: 0 K/UL (ref 0–0.2)
PLATELET # BLD AUTO: 254 K/UL (ref 150–450)
PMV BLD AUTO: 10.1 FL (ref 9.4–12.3)
POTASSIUM SERPL-SCNC: 3.2 MMOL/L (ref 3.5–5.1)
PROT SERPL-MCNC: 5.4 G/DL (ref 6.3–8.2)
PROTHROMBIN TIME: 38.1 SEC (ref 11.5–14.5)
RBC # BLD AUTO: 3.08 M/UL (ref 4.23–5.6)
SODIUM SERPL-SCNC: 140 MMOL/L (ref 136–145)
WBC # BLD AUTO: 11.8 K/UL (ref 4.3–11.1)

## 2018-11-14 PROCEDURE — 74011000258 HC RX REV CODE- 258: Performed by: NURSE PRACTITIONER

## 2018-11-14 PROCEDURE — 36415 COLL VENOUS BLD VENIPUNCTURE: CPT

## 2018-11-14 PROCEDURE — 85610 PROTHROMBIN TIME: CPT

## 2018-11-14 PROCEDURE — 85025 COMPLETE CBC W/AUTO DIFF WBC: CPT

## 2018-11-14 PROCEDURE — 77010033678 HC OXYGEN DAILY

## 2018-11-14 PROCEDURE — 74011250637 HC RX REV CODE- 250/637: Performed by: INTERNAL MEDICINE

## 2018-11-14 PROCEDURE — 74011250636 HC RX REV CODE- 250/636: Performed by: NURSE PRACTITIONER

## 2018-11-14 PROCEDURE — 80076 HEPATIC FUNCTION PANEL: CPT

## 2018-11-14 PROCEDURE — 80048 BASIC METABOLIC PNL TOTAL CA: CPT

## 2018-11-14 PROCEDURE — 94640 AIRWAY INHALATION TREATMENT: CPT

## 2018-11-14 PROCEDURE — 74011250637 HC RX REV CODE- 250/637: Performed by: NURSE PRACTITIONER

## 2018-11-14 PROCEDURE — 65660000000 HC RM CCU STEPDOWN

## 2018-11-14 PROCEDURE — 74011636637 HC RX REV CODE- 636/637: Performed by: NURSE PRACTITIONER

## 2018-11-14 PROCEDURE — 94760 N-INVAS EAR/PLS OXIMETRY 1: CPT

## 2018-11-14 PROCEDURE — 77030021668 HC NEB PREFIL KT VYRM -A

## 2018-11-14 PROCEDURE — 86480 TB TEST CELL IMMUN MEASURE: CPT

## 2018-11-14 PROCEDURE — 77030019605

## 2018-11-14 RX ORDER — NYSTATIN 100000 [USP'U]/G
POWDER TOPICAL
Status: DISCONTINUED | OUTPATIENT
Start: 2018-11-14 | End: 2018-11-16 | Stop reason: HOSPADM

## 2018-11-14 RX ORDER — POTASSIUM CHLORIDE 20 MEQ/1
40 TABLET, EXTENDED RELEASE ORAL
Status: COMPLETED | OUTPATIENT
Start: 2018-11-14 | End: 2018-11-14

## 2018-11-14 RX ADMIN — TOPIRAMATE 100 MG: 25 TABLET, FILM COATED ORAL at 08:59

## 2018-11-14 RX ADMIN — VENLAFAXINE HYDROCHLORIDE 75 MG: 75 CAPSULE, EXTENDED RELEASE ORAL at 21:44

## 2018-11-14 RX ADMIN — PANTOPRAZOLE SODIUM 40 MG: 40 TABLET, DELAYED RELEASE ORAL at 05:40

## 2018-11-14 RX ADMIN — TIOTROPIUM BROMIDE 18 MCG: 18 CAPSULE ORAL; RESPIRATORY (INHALATION) at 07:41

## 2018-11-14 RX ADMIN — LEVOTHYROXINE SODIUM 25 MCG: 50 TABLET ORAL at 05:40

## 2018-11-14 RX ADMIN — POTASSIUM CHLORIDE 40 MEQ: 20 TABLET, EXTENDED RELEASE ORAL at 20:38

## 2018-11-14 RX ADMIN — ATORVASTATIN CALCIUM 40 MG: 40 TABLET, FILM COATED ORAL at 08:55

## 2018-11-14 RX ADMIN — FAMOTIDINE 40 MG: 20 TABLET ORAL at 17:38

## 2018-11-14 RX ADMIN — AMPICILLIN SODIUM 2 G: 2 INJECTION, POWDER, FOR SOLUTION INTRAMUSCULAR; INTRAVENOUS at 00:28

## 2018-11-14 RX ADMIN — MORPHINE SULFATE 2 MG: 2 INJECTION, SOLUTION INTRAMUSCULAR; INTRAVENOUS at 18:28

## 2018-11-14 RX ADMIN — Medication 5 ML: at 14:00

## 2018-11-14 RX ADMIN — AMPICILLIN SODIUM 2 G: 2 INJECTION, POWDER, FOR SOLUTION INTRAMUSCULAR; INTRAVENOUS at 11:49

## 2018-11-14 RX ADMIN — CEFTRIAXONE SODIUM 2 G: 2 INJECTION, POWDER, FOR SOLUTION INTRAMUSCULAR; INTRAVENOUS at 05:41

## 2018-11-14 RX ADMIN — AMPICILLIN SODIUM 2 G: 2 INJECTION, POWDER, FOR SOLUTION INTRAMUSCULAR; INTRAVENOUS at 03:51

## 2018-11-14 RX ADMIN — LISINOPRIL 2.5 MG: 5 TABLET ORAL at 08:56

## 2018-11-14 RX ADMIN — Medication 5 ML: at 21:45

## 2018-11-14 RX ADMIN — TICAGRELOR 90 MG: 90 TABLET ORAL at 08:57

## 2018-11-14 RX ADMIN — AMPICILLIN SODIUM 2 G: 2 INJECTION, POWDER, FOR SOLUTION INTRAMUSCULAR; INTRAVENOUS at 20:38

## 2018-11-14 RX ADMIN — TAMSULOSIN HYDROCHLORIDE 0.4 MG: 0.4 CAPSULE ORAL at 08:57

## 2018-11-14 RX ADMIN — AMPICILLIN SODIUM 2 G: 2 INJECTION, POWDER, FOR SOLUTION INTRAMUSCULAR; INTRAVENOUS at 17:37

## 2018-11-14 RX ADMIN — METOPROLOL SUCCINATE 25 MG: 25 TABLET, EXTENDED RELEASE ORAL at 08:58

## 2018-11-14 RX ADMIN — Medication 10 ML: at 05:41

## 2018-11-14 RX ADMIN — CEFTRIAXONE SODIUM 2 G: 2 INJECTION, POWDER, FOR SOLUTION INTRAMUSCULAR; INTRAVENOUS at 17:38

## 2018-11-14 RX ADMIN — FAMOTIDINE 40 MG: 20 TABLET ORAL at 08:58

## 2018-11-14 RX ADMIN — AMPICILLIN SODIUM 2 G: 2 INJECTION, POWDER, FOR SOLUTION INTRAMUSCULAR; INTRAVENOUS at 08:49

## 2018-11-14 RX ADMIN — TICAGRELOR 90 MG: 90 TABLET ORAL at 21:44

## 2018-11-14 RX ADMIN — NYSTATIN: 100000 POWDER TOPICAL at 18:13

## 2018-11-14 RX ADMIN — PREDNISONE 10 MG: 5 TABLET ORAL at 08:56

## 2018-11-14 NOTE — PROGRESS NOTES
Bedside and Verbal shift change report given to Brandon Pool RN (oncoming nurse) by self Bre ríos). Report included the following information SBAR, Kardex, MAR and Recent Results.

## 2018-11-14 NOTE — PROGRESS NOTES
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 Source of bacteremia unknown · Treated [facility] BC+10/11/18, treated with 7 days IV/7 days PO  
· AMS: delirium vs other · COPD: on chronic prednisone · Hx NSTEMI s/p PCI 10/2018 Plan:  
 
· Continue  Ampicillin continuous infusion with Ceftriaxone 2g IV Q12h for synergy: anticipate prolonged course 6 weeks once establish start date. · Repeat BC  just called positive in both sets for [facility]. Repeat blood cx tomorrow AM. · Mental status is improved today; follow · WBC down. AST and ALT and Alk phos remain mildly elevated, Bili normal. Has had cholecystectomy in past. Renal function stable to improved. Anti-infectives: · Ampicillin - · Ceftriaxone - · Vanc - Subjective: Much calmer; ate some breakfast; states feels better. Some diarrhea this AM 
 
 
Review of Systems:  A comprehensive review of systems was negative except for that written in the History of Present Illness. Objective:  
 
Visit Vitals /59 (BP 1 Location: Left arm, BP Patient Position: At rest) Pulse (!) 57 Temp 98.2 °F (36.8 °C) Resp 20 Wt 86.8 kg (191 lb 6.4 oz) SpO2 99% BMI 29.10 kg/m² Temp (24hrs), Av.3 °F (36.8 °C), Min:98.1 °F (36.7 °C), Max:98.8 °F (37.1 °C) Lines:  none Physical Exam:  
General:  Alert, cooperative, well noursished, well developed, appears stated age Eyes:  Sclera anicteric. Pupils equally round and reactive to light, splinter hemorrhages on left conjunctiva Mouth/Throat: Mucous membranes normal, oral pharynx clear Neck: Supple, symmetric Lungs:   Clear to auscultation bilaterally, good effort CV:  Regular rate and rhythm,no murmur, click, rub or gallop Abdomen:   Soft, non-tender. bowel sounds normal. non-distended Extremities: No cyanosis or edema Skin: Scattered ecchymosis and bruising; arm edema bilaterally about same. Lymph nodes: Cervical and supraclavicular normal  
Musculoskeletal: No swelling or deformity, no nail hemorrhages Lines/Devices:  No lines Psych: Alert and oriented, normal mood affect given the setting; much calmer Data Review: CBC:  
Recent Labs 11/14/18 0410 11/13/18 
0542 11/12/18 2053 WBC 11.8* 12.9* 18.5*  
RBC 3.08* 3.08* 3.50* HGB 9.3* 9.4* 11.0*  
HCT 29.2* 28.7* 31.4*  248 230 GRANS 74 74  --   
LYMPH 13 11*  --   
EOS 2 2  --   
 
CMP:  
Recent Labs 11/14/18 0410 11/13/18 
0003 GLU 75 79  139  
K 3.2* 3.8 * 112* CO2 17* 20* BUN 12 15 CREA 0.84  0.82 1.05  
CA 7.4* 7.8* AGAP 9 7 *  --   
TP 5.4*  --   
ALB 2.1*  --   
GLOB 3.3  --   
AGRAT 0.6*  -- Liver Enzymes:  
Recent Labs 11/14/18 0410 TP 5.4* ALB 2.1* * SGOT 40* Inflammation studies: No results found for: SR, ESRA, CRP Microbiology:  
 
 
Imaging:  
 
 
Signed By: Mayra Patten MD   
 November 14, 2018

## 2018-11-14 NOTE — PROGRESS NOTES
Problem: Falls - Risk of 
Goal: *Absence of Falls Document Debria Check Fall Risk and appropriate interventions in the flowsheet. Outcome: Progressing Towards Goal 
Fall Risk Interventions: 
Mobility Interventions: Bed/chair exit alarm, Patient to call before getting OOB Mentation Interventions: Bed/chair exit alarm, Adequate sleep, hydration, pain control, Toileting rounds Medication Interventions: Bed/chair exit alarm, Patient to call before getting OOB, Teach patient to arise slowly Elimination Interventions: Bed/chair exit alarm, Call light in reach, Toileting schedule/hourly rounds, Urinal in reach

## 2018-11-14 NOTE — PROGRESS NOTES
PT & OT ordered, Quantiferon test has been ordered to replace PPD. Referral to [facility]. Pt requiring long term IV abx. 
 
1600 CM met with pt & spouse, agreeable to [facility] if insurance approves. Care Management Interventions PCP Verified by CM: Yes Mode of Transport at Discharge: Other (see comment) Transition of Care Consult (CM Consult): Discharge Planning, SNF Discharge Durable Medical Equipment: No 
Physical Therapy Consult: Yes Occupational Therapy Consult: Yes Speech Therapy Consult: No 
Current Support Network: Lives with Spouse, Own Home Confirm Follow Up Transport: Family Plan discussed with Pt/Family/Caregiver: Yes Freedom of Choice Offered: Yes Discharge Location Discharge Placement: Rehab Unit Subacute

## 2018-11-14 NOTE — PROGRESS NOTES
[facility] CARDIOLOGY PROGRESS NOTE 
      
 
11/14/2018 7:41 AM 
 
Admit Date: 11/12/2018 Subjective: CHRISTINE yesterday showed small AV veg and could not rule out swelling in tissues around the AV. He is an exceedingly poor candidate for redo AV surgery. ID has consulted and recommended abx. ROS: 
Limited by AMS Objective:  
  
Vitals:  
 11/13/18 2340 11/14/18 0039 11/14/18 0415 11/14/18 0424 BP:  123/69  119/59 Pulse:  81  (!) 57 Resp:  20  20 Temp:  98.2 °F (36.8 °C)  98.2 °F (36.8 °C) SpO2: 99% 98% 98% 98% Weight:    86.8 kg (191 lb 6.4 oz) Physical Exam: 
General: Well Developed, Well Nourished, No Acute Distress, Neck: supple, no JVD Heart: S1S2 with RRR without murmurs or gallops Lungs: wheezing bilat Abd: soft, nontender, nondistended, with good bowel sounds Ext: no edema bilaterally Skin: warm and dry Data Review:  
Recent Labs 11/14/18 
0410 11/13/18 
7357 11/13/18 
0003  11/12/18 2053   --  139  --   --   
K 3.2*  --  3.8  --   --   
MG  --   --   --   --  2.1 BUN 12  --  15  --   --   
CREA 0.84  0.82  --  1.05  --   --   
GLU 75  --  79  --   --   
WBC 11.8* 12.9*  --   --  18.5* HGB 9.3* 9.4*  --   --  11.0*  
HCT 29.2* 28.7*  --   --  31.4*  248  --   --  230 INR 4.1* 4.4*  --    < >  --   
 < > = values in this interval not displayed. No results for input(s): Nneka Wilder in the last 72 hours. Assessment/Plan: Active Problems: 
  Coronary atherosclerosis of native coronary artery (10/22/2015) Mixed hyperlipidemia (10/22/2015) Essential hypertension (10/22/2015) S/P AVR (aortic valve replacement) (5/25/2016) COPD (chronic obstructive pulmonary disease) ([facility] [address] (9/19/2018) Acquired hypothyroidism (9/23/2018) Anticoagulated on Coumadin (9/24/2018) Overview: For aortic valve. Leukocytosis (10/22/2018) Endocarditis (11/12/2018) 1) Endocarditis - ID rec on abx, course is unclear, not a candidate for redo AVR 
2) COPD - will consult IM for mgmt 3) CAD - on DAPT but will switch to brilinta alone 4) AMS - will ask IM for assistance.  
 
 
Lonny Benton MD 
11/14/2018 7:41 AM

## 2018-11-14 NOTE — PROGRESS NOTES
Bedside and Verbal shift change report given to self (oncoming nurse) by Emily Costa RN (offgoing nurse). Report included the following information SBAR, Kardex, MAR and Recent Results.

## 2018-11-14 NOTE — PROGRESS NOTES
Bedside and Verbal shift change report given to self (oncoming nurse) by Keyon Horne RN (offgoing nurse). Report included the following information SBAR, Kardex, MAR and Recent Results.

## 2018-11-14 NOTE — PROGRESS NOTES
Warfarin dosing per pharmacist 
 
Bogdan Whitley is a 68 y.o. male. Weight: 86.8 kg (191 lb 6.4 oz) Indication:  Mechanical AVR Goal INR:  2.5 to 3.5 per Levine, Susan. \[facility].\"" cardiology coumadin clinic notes from 11/9/18 at which time INR was 4.3 Home dose:  2 mg on MWF, 1 mg all other days of the week per Levine, Susan. \[facility].\"" cardiology note from 11/9/18 Risk factors or significant drug interactions:  Elevated INR on admission, on prednisone and ticagrelor Other anticoagulants:  N/A Daily Monitoring Date  INR     Warfarin dose HGB              Notes 11/12  4.0  ?  11.0 
11/13  4.4  Hold  9.4   
11/14  4.1  Hold  9.3 Pharmacy consulted to assist dosing warfarin in patient with past medical history of mechanical AVR. Patient initially presented with a supratherapeutic INR. INR remains supratherapeutic at 4.1 today. Will continue holding warfarin for now. Check INR daily. Plan to resume warfarin once INR trending back downwards and closer to upper end of goal range at 3.5. Thank you, Ayah Roque, PharmD, [facility] Clinical [facility] 
[phone]

## 2018-11-15 LAB
ANION GAP SERPL CALC-SCNC: 11 MMOL/L (ref 7–16)
BASOPHILS # BLD: 0 K/UL (ref 0–0.2)
BASOPHILS NFR BLD: 0 % (ref 0–2)
BUN SERPL-MCNC: 9 MG/DL (ref 8–23)
CALCIUM SERPL-MCNC: 7.4 MG/DL (ref 8.3–10.4)
CHLORIDE SERPL-SCNC: 111 MMOL/L (ref 98–107)
CO2 SERPL-SCNC: 18 MMOL/L (ref 21–32)
CREAT SERPL-MCNC: 0.97 MG/DL (ref 0.8–1.5)
DIFFERENTIAL METHOD BLD: ABNORMAL
EOSINOPHIL # BLD: 0.1 K/UL (ref 0–0.8)
EOSINOPHIL NFR BLD: 1 % (ref 0.5–7.8)
ERYTHROCYTE [DISTWIDTH] IN BLOOD BY AUTOMATED COUNT: 14.6 %
GLUCOSE SERPL-MCNC: 112 MG/DL (ref 65–100)
HCT VFR BLD AUTO: 30.2 % (ref 41.1–50.3)
HGB BLD-MCNC: 10 G/DL (ref 13.6–17.2)
IMM GRANULOCYTES # BLD: 0.4 K/UL (ref 0–0.5)
IMM GRANULOCYTES NFR BLD AUTO: 3 % (ref 0–5)
INR PPP: 1.9
LYMPHOCYTES # BLD: 1.5 K/UL (ref 0.5–4.6)
LYMPHOCYTES NFR BLD: 11 % (ref 13–44)
MCH RBC QN AUTO: 31.1 PG (ref 26.1–32.9)
MCHC RBC AUTO-ENTMCNC: 33.1 G/DL (ref 31.4–35)
MCV RBC AUTO: 93.8 FL (ref 79.6–97.8)
MONOCYTES # BLD: 1.1 K/UL (ref 0.1–1.3)
MONOCYTES NFR BLD: 8 % (ref 4–12)
NEUTS SEG # BLD: 10.9 K/UL (ref 1.7–8.2)
NEUTS SEG NFR BLD: 77 % (ref 43–78)
NRBC # BLD: 0 K/UL (ref 0–0.2)
PLATELET # BLD AUTO: 286 K/UL (ref 150–450)
PMV BLD AUTO: 9.7 FL (ref 9.4–12.3)
POTASSIUM SERPL-SCNC: 3.5 MMOL/L (ref 3.5–5.1)
PROTHROMBIN TIME: 20.9 SEC (ref 11.5–14.5)
RBC # BLD AUTO: 3.22 M/UL (ref 4.23–5.6)
SODIUM SERPL-SCNC: 140 MMOL/L (ref 136–145)
WBC # BLD AUTO: 14.1 K/UL (ref 4.3–11.1)

## 2018-11-15 PROCEDURE — 74011250637 HC RX REV CODE- 250/637: Performed by: NURSE PRACTITIONER

## 2018-11-15 PROCEDURE — 85025 COMPLETE CBC W/AUTO DIFF WBC: CPT

## 2018-11-15 PROCEDURE — 80048 BASIC METABOLIC PNL TOTAL CA: CPT

## 2018-11-15 PROCEDURE — 74011636637 HC RX REV CODE- 636/637: Performed by: NURSE PRACTITIONER

## 2018-11-15 PROCEDURE — 36415 COLL VENOUS BLD VENIPUNCTURE: CPT

## 2018-11-15 PROCEDURE — 85610 PROTHROMBIN TIME: CPT

## 2018-11-15 PROCEDURE — 74011000250 HC RX REV CODE- 250: Performed by: NURSE PRACTITIONER

## 2018-11-15 PROCEDURE — 94760 N-INVAS EAR/PLS OXIMETRY 1: CPT

## 2018-11-15 PROCEDURE — 74011000258 HC RX REV CODE- 258: Performed by: NURSE PRACTITIONER

## 2018-11-15 PROCEDURE — 97165 OT EVAL LOW COMPLEX 30 MIN: CPT

## 2018-11-15 PROCEDURE — 74011250637 HC RX REV CODE- 250/637: Performed by: FAMILY MEDICINE

## 2018-11-15 PROCEDURE — 77010033678 HC OXYGEN DAILY

## 2018-11-15 PROCEDURE — 74011250636 HC RX REV CODE- 250/636: Performed by: NURSE PRACTITIONER

## 2018-11-15 PROCEDURE — 97162 PT EVAL MOD COMPLEX 30 MIN: CPT

## 2018-11-15 PROCEDURE — 94640 AIRWAY INHALATION TREATMENT: CPT

## 2018-11-15 PROCEDURE — 65660000000 HC RM CCU STEPDOWN

## 2018-11-15 PROCEDURE — 74011250636 HC RX REV CODE- 250/636: Performed by: INTERNAL MEDICINE

## 2018-11-15 RX ORDER — WARFARIN 1 MG/1
2 TABLET ORAL EVERY EVENING
Status: DISCONTINUED | OUTPATIENT
Start: 2018-11-15 | End: 2018-11-16

## 2018-11-15 RX ORDER — HYDROCODONE BITARTRATE AND ACETAMINOPHEN 7.5; 325 MG/1; MG/1
1 TABLET ORAL
Status: DISCONTINUED | OUTPATIENT
Start: 2018-11-15 | End: 2018-11-16 | Stop reason: HOSPADM

## 2018-11-15 RX ORDER — ENOXAPARIN SODIUM 100 MG/ML
1 INJECTION SUBCUTANEOUS EVERY 12 HOURS
Status: DISCONTINUED | OUTPATIENT
Start: 2018-11-15 | End: 2018-11-15

## 2018-11-15 RX ADMIN — VENLAFAXINE HYDROCHLORIDE 75 MG: 75 CAPSULE, EXTENDED RELEASE ORAL at 21:30

## 2018-11-15 RX ADMIN — TOPIRAMATE 100 MG: 25 TABLET, FILM COATED ORAL at 08:19

## 2018-11-15 RX ADMIN — TICAGRELOR 90 MG: 90 TABLET ORAL at 08:19

## 2018-11-15 RX ADMIN — FAMOTIDINE 40 MG: 20 TABLET ORAL at 17:49

## 2018-11-15 RX ADMIN — TIOTROPIUM BROMIDE 18 MCG: 18 CAPSULE ORAL; RESPIRATORY (INHALATION) at 07:55

## 2018-11-15 RX ADMIN — ALBUTEROL SULFATE 2.5 MG: 2.5 SOLUTION RESPIRATORY (INHALATION) at 12:29

## 2018-11-15 RX ADMIN — AMPICILLIN SODIUM 2 G: 2 INJECTION, POWDER, FOR SOLUTION INTRAMUSCULAR; INTRAVENOUS at 20:39

## 2018-11-15 RX ADMIN — Medication 10 ML: at 21:30

## 2018-11-15 RX ADMIN — CEFTRIAXONE SODIUM 2 G: 2 INJECTION, POWDER, FOR SOLUTION INTRAMUSCULAR; INTRAVENOUS at 16:49

## 2018-11-15 RX ADMIN — AMPICILLIN SODIUM 2 G: 2 INJECTION, POWDER, FOR SOLUTION INTRAMUSCULAR; INTRAVENOUS at 04:42

## 2018-11-15 RX ADMIN — AMPICILLIN SODIUM 2 G: 2 INJECTION, POWDER, FOR SOLUTION INTRAMUSCULAR; INTRAVENOUS at 11:34

## 2018-11-15 RX ADMIN — TICAGRELOR 90 MG: 90 TABLET ORAL at 21:30

## 2018-11-15 RX ADMIN — ENOXAPARIN SODIUM 90 MG: 100 INJECTION SUBCUTANEOUS at 12:20

## 2018-11-15 RX ADMIN — LISINOPRIL 2.5 MG: 5 TABLET ORAL at 08:21

## 2018-11-15 RX ADMIN — Medication 10 ML: at 05:38

## 2018-11-15 RX ADMIN — ATORVASTATIN CALCIUM 40 MG: 40 TABLET, FILM COATED ORAL at 08:19

## 2018-11-15 RX ADMIN — HYDROCODONE BITARTRATE AND ACETAMINOPHEN 1 TABLET: 7.5; 325 TABLET ORAL at 16:49

## 2018-11-15 RX ADMIN — TAMSULOSIN HYDROCHLORIDE 0.4 MG: 0.4 CAPSULE ORAL at 08:19

## 2018-11-15 RX ADMIN — AMPICILLIN SODIUM 2 G: 2 INJECTION, POWDER, FOR SOLUTION INTRAMUSCULAR; INTRAVENOUS at 15:56

## 2018-11-15 RX ADMIN — PANTOPRAZOLE SODIUM 40 MG: 40 TABLET, DELAYED RELEASE ORAL at 05:35

## 2018-11-15 RX ADMIN — AMPICILLIN SODIUM 2 G: 2 INJECTION, POWDER, FOR SOLUTION INTRAMUSCULAR; INTRAVENOUS at 00:11

## 2018-11-15 RX ADMIN — CEFTRIAXONE SODIUM 2 G: 2 INJECTION, POWDER, FOR SOLUTION INTRAMUSCULAR; INTRAVENOUS at 05:32

## 2018-11-15 RX ADMIN — AMPICILLIN SODIUM 2 G: 2 INJECTION, POWDER, FOR SOLUTION INTRAMUSCULAR; INTRAVENOUS at 08:11

## 2018-11-15 RX ADMIN — LEVOTHYROXINE SODIUM 25 MCG: 50 TABLET ORAL at 05:36

## 2018-11-15 RX ADMIN — METOPROLOL SUCCINATE 25 MG: 25 TABLET, EXTENDED RELEASE ORAL at 08:21

## 2018-11-15 RX ADMIN — Medication 5 ML: at 15:56

## 2018-11-15 RX ADMIN — FAMOTIDINE 40 MG: 20 TABLET ORAL at 08:19

## 2018-11-15 RX ADMIN — WARFARIN SODIUM 2 MG: 1 TABLET ORAL at 17:49

## 2018-11-15 RX ADMIN — PREDNISONE 10 MG: 5 TABLET ORAL at 08:19

## 2018-11-15 NOTE — PROGRESS NOTES
Problem: Self Care Deficits Care Plan (Adult) Goal: *Acute Goals and Plan of Care (Insert Text) 1. Pt will toilet with SBA 2. Pt will complete functional mobility for ADLs with SBA 3. Pt will complete lower body dressing with SBA using AE as needed 4. Pt will complete grooming and hygiene at sink with SBA 5. Pt will demonstrate independence with HEP to promote increased BUE strength and functional use for ADLs 6. Pt will tolerate 23 minutes functional activity with one or fewer rest breaks to promote increased endurance for ADLs 7. Pt will complete improved cognition to complete functional tasks w/ min or fewer cues Timeframe: 7 days OCCUPATIONAL THERAPY: Initial Assessment 11/15/2018INPATIENT: Hospital Day: 4 Payor: FIRST [employer] VIP CARE PLUS / [place] [employer] CHO[employer] VIP CARE PLUS / Product Type: [employer] Care Medicare /  
  
NAME/AGE/GENDER: Raymond Leon is a 68 y.o. male PRIMARY DIAGNOSIS:  COPD Exacerbation Sepsis Endocarditis <principal problem not specified> <principal problem not specified> 
 
  
ICD-10: Treatment Diagnosis:  
 · Generalized Muscle Weakness (M62.81) Precautions/Allergies: 
   Advair diskus [fluticasone-salmeterol] and Prozac [fluoxetine] ASSESSMENT:  
Mr. Latosha Stubbs was admitted with confusion, weakness, fever, chills, and SOB. Workup + endocarditis, per chart is a poor surgical candidate. Pt reports that he lives with his girlfriend and was independent and driving at [facility], uses RW for mobility. This session, pt presented A&O to all but time, was unable to state year of birth, confused. Pt was initially pleasant, however became increasingly agitated with questions and as session progressed. Pt demonstrated deficits in cognition, strength, and endurance impacting ADLs. Pt transferred in/ out of bed with SBA and demonstrated intact sitting balance, declined mobility or even to stand d/t agitation. BUE strength is generally < but WFL, grossly 4/5.  Pt appears to be below his functional baseline and would benefit from skilled OT services to address deficits. This section established at most recent assessment PROBLEM LIST (Impairments causing functional limitations): 1. Decreased Strength 2. Decreased ADL/Functional Activities 3. Decreased Balance 4. Decreased Activity Tolerance 5. Decreased Cognition INTERVENTIONS PLANNED: (Benefits and precautions of occupational therapy have been discussed with the patient.) 1. Activities of daily living training 2. Adaptive equipment training 3. Balance training 4. Cognitive training 5. Therapeutic activity 6. Therapeutic exercise TREATMENT PLAN: Frequency/Duration: Follow patient 3 times/ week to address above goals. Rehabilitation Potential For Stated Goals: Fair RECOMMENDED REHABILITATION/EQUIPMENT: (at time of discharge pending progress): Due to the probability of continued deficits (see above) this patient will likely need continued skilled occupational therapy after discharge. Equipment: ? TBD  
    
 
 
 
OCCUPATIONAL PROFILE AND HISTORY:  
History of Present Injury/Illness (Reason for Referral): 
See H&P Past Medical History/Comorbidities:  
Mr. Ivett Guillen  has a past medical history of Aortic stenosis, Chest pain, COPD (chronic obstructive pulmonary disease) ([facility] [address] Coronary artery disease, Coronary atherosclerosis of native coronary artery, CVA (cerebrovascular accident) ([facility] [address] Essential hypertension, Mixed hyperlipidemia, and Tobacco use disorder. Mr. Ivett Guillen  has a past surgical history that includes hx coronary artery bypass graft (1996). Social History/Living Environment:  
Home Environment: Private residence # Steps to Enter: 4 One/Two Story Residence: One story Living Alone: No 
Support Systems: Spouse/Significant Other/Partner Patient Expects to be Discharged to[de-identified] Unknown Current DME Used/Available at Home: Anshul Martinez, [address] [facility] chair Tub or Shower Type: Tub/Shower combination Prior Level of Function/Work/Activity: 
Independent, lives w/ GF, uses RW for mobility Number of Personal Factors/Comorbidities that affect the Plan of Care: Brief history (0):  LOW COMPLEXITY ASSESSMENT OF OCCUPATIONAL PERFORMANCE[de-identified]  
Activities of Daily Living:  
Basic ADLs (From Assessment) Complex ADLs (From Assessment) Feeding: Independent Oral Facial Hygiene/Grooming: Contact guard assistance Bathing: Minimum assistance Upper Body Dressing: Setup Lower Body Dressing: Minimum assistance Toileting: Minimum assistance Instrumental ADL Meal Preparation: Moderate assistance Homemaking: Moderate assistance Medication Management: Moderate assistance Financial Management: Moderate assistance Grooming/Bathing/Dressing Activities of Daily Living Cognitive Retraining Safety/Judgement: Awareness of environment Bed/Mat Mobility Supine to Sit: Stand-by assistance Sit to Supine: Stand-by assistance Sit to Stand: (Pt refused) Scooting: Stand-by assistance Most Recent Physical Functioning:  
Gross Assessment: 
AROM: Generally decreased, functional 
Strength: Generally decreased, functional 
Coordination: Within functional limits Tone: Normal 
         
  
Posture: 
  
Balance: 
Sitting: Intact Bed Mobility: 
Supine to Sit: Stand-by assistance Sit to Supine: Stand-by assistance Scooting: Stand-by assistance Wheelchair Mobility: 
  
Transfers: 
Sit to Stand: (Pt refused) Patient Vitals for the past 6 hrs: 
 BP BP Patient Position SpO2 O2 Flow Rate (L/min) Pulse 11/15/18 0430 118/73  99 %  75  
11/15/18 0442    3 l/min   
11/15/18 0759   98 % 3 l/min   
11/15/18 0811    3 l/min   
11/15/18 0819 137/88    76  
11/15/18 0924 132/74 At rest 99 %  64 Mental Status Neurologic State: Alert Orientation Level: Oriented to person, Oriented to place, Oriented to situation, Disoriented to time(unable to recall year of birth) Cognition: Follows commands, Impaired decision making, Memory loss Perception: Appears intact Perseveration: No perseveration noted Safety/Judgement: Awareness of environment Physical Skills Involved: 
1. Balance 2. Strength 3. Activity Tolerance Cognitive Skills Affected (resulting in the inability to perform in a timely and safe manner): 1. Executive Function 2. Short Term Recall 3. Long Term Memory 4. Sustained Attention 5. Divided Attention 6. Comprehension Psychosocial Skills Affected: 1. Habits/Routines 2. Environmental Adaptation 3. Social Interaction 4. Emotional Regulation 5. Self-Awareness 6. Awareness of Others 7. Social Roles Number of elements that affect the Plan of Care: 3-5:  MODERATE COMPLEXITY CLINICAL DECISION MAKING:  
[facility] [place] [place] [employer] AM-PAC 6 Clicks Daily Activity Inpatient Short Form How much help from another person does the patient currently need. .. Total A Lot A Little None 1. Putting on and taking off regular lower body clothing? [] 1   [] 2   [x] 3   [] 4  
2. Bathing (including washing, rinsing, drying)? [] 1   [] 2   [x] 3   [] 4  
3. Toileting, which includes using toilet, bedpan or urinal?   [] 1   [] 2   [x] 3   [] 4  
4. Putting on and taking off regular upper body clothing? [] 1   [] 2   [] 3   [x] 4  
5. Taking care of personal grooming such as brushing teeth? [] 1   [] 2   [] 3   [x] 4  
6. Eating meals? [] 1   [] 2   [] 3   [x] 4  
© 2007, Trustees of [facility] [place], [place] [employer], under license to [employer]. All rights reserved Score:  Initial: 21 Most Recent: X (Date: -- ) Interpretation of Tool:  Represents activities that are increasingly more difficult (i.e. Bed mobility, Transfers, Gait). Score 24 23 22-20 19-15 14-10 9-7 6 Modifier CH CI CJ CK CL CM CN   
 
? Self Care:  - CURRENT STATUS: CJ - 20%-39% impaired, limited or restricted  - GOAL STATUS: CI - 1%-19% impaired, limited or restricted  - D/C STATUS:  ---------------To be determined--------------- Payor: [employer] VIP CARE PLUS / [place] [employer] VIP CARE PLUS / Product Type: Managed Care Medicare /   
 
Medical Necessity:    
· Patient demonstrates fair rehab potential due to higher previous functional level. Reason for Services/Other Comments: 
· Patient continues to require skilled intervention due to decreased ADLs and functional performance from baseline. Use of outcome tool(s) and clinical judgement create a POC that gives a: LOW COMPLEXITY  
 
 
 
TREATMENT:  
(In addition to Assessment/Re-Assessment sessions the following treatments were rendered) Pre-treatment Symptoms/Complaints:   
Pain: Initial:  
Pain Intensity 1: 0  Post Session:  0 Assessment/Reassessment only, no treatment provided today Braces/Orthotics/Lines/Etc:  
· O2 Device: Nasal cannula Treatment/Session Assessment:   
· Response to Treatment:  No adverse reaction · Interdisciplinary Collaboration:  
o Occupational Therapist 
o Registered Nurse · After treatment position/precautions:  
o Supine in bed 
o Bed/Chair-wheels locked 
o Bed in low position 
o Call light within reach 
o RN notified · Compliance with Program/Exercises: Will assess as treatment progresses. · Recommendations/Intent for next treatment session: \"Next visit will focus on advancements to more challenging activities and reduction in assistance provided\". Total Treatment Duration: OT Patient Time In/Time Out Time In: 8246 Time Out: 0080 Morales Hernandez OT

## 2018-11-15 NOTE — PROGRESS NOTES
Problem: Mobility Impaired (Adult and Pediatric) Goal: *Acute Goals and Plan of Care (Insert Text) PHYSICAL THERAPY: Initial Assessment, Discharge, PM 11/15/2018INPATIENT: Hospital Day: 4 Payor: FIRST CHO[employer] VIP CARE PLUS / [place] [employer] CHO[employer] VIP CARE PLUS / Product Type: Managed Care Medicare /  
  
NAME/AGE/GENDER: Jorge Gustafson is a 68 y.o. male PRIMARY DIAGNOSIS: COPD Exacerbation Sepsis Endocarditis <principal problem not specified> <principal problem not specified> 
 
  
ICD-10: Treatment Diagnosis:  
 · Generalized Muscle Weakness (M62.81) Precaution/Allergies: 
Advair diskus [fluticasone-salmeterol] and Prozac [fluoxetine] ASSESSMENT:  
Mr. Tiff Taylor is a 68 y.o. male in the hospital for the above who was supine in bed upon arrival.  Pt reports that he lives in a one story house with girlfriend that has 1 step to enter from back. Pt also reported that PTA he was independent with ADLs and ambulated with RW. Pt became increasingly agitated with questioning. Mr. Tiff Taylor presents to PT with [facility] AROM and decreased strength in B LEs. During evaluation pt performed supine to sit <> with SBA and intact sitting balance. Pt suggested pt stand to get repositioned in bed which he refused. Pt resumed supine position and when PT attempted to help reposition pillow pt got very agitated. PT asked if pt has any interest in participating in PT and he said \"No.\"  Pt appears SOB and RN alerted to pt's current situation as well as pt's decreased safety awareness. Mr. Tiff Taylor could benefit from skilled PT but due to non-compliance will discharge at this time. Please re-consult if status changes. 1.   
  
 
RECOMMENDED REHABILITATION/EQUIPMENT: (at time of discharge pending progress): Due to the probability of continued deficits (see above) this patient will likely need continued skilled physical therapy after discharge. Equipment:  
? None at this time HISTORY:  
 History of Present Injury/Illness (Reason for Referral): 
See H&P Past Medical History/Comorbidities:  
Mr. Donovan Carrizales  has a past medical history of Aortic stenosis, Chest pain, COPD (chronic obstructive pulmonary disease) ([facility] [address] Coronary artery disease, Coronary atherosclerosis of native coronary artery, CVA (cerebrovascular accident) ([facility] [address] Essential hypertension, Mixed hyperlipidemia, and Tobacco use disorder. Mr. Donovan Carrizales  has a past surgical history that includes hx coronary artery bypass graft (1996). Social History/Living Environment:  
Home Environment: Private residence # Steps to Enter: 4 One/Two Story Residence: One story Living Alone: No 
Support Systems: Spouse/Significant Other/Partner Patient Expects to be Discharged to[de-identified] Unknown Current DME Used/Available at Home: Walker, rolling Tub or Shower Type: Tub/Shower combination Prior Level of Function/Work/Activity: 
Reported he lives in a one story house with girlfriend and was independent with ADLs. Use RW for ambulation. Number of Personal Factors/Comorbidities that affect the Plan of Care: 3+: HIGH COMPLEXITY EXAMINATION:  
Most Recent Physical Functioning:  
Gross Assessment: 
AROM: Within functional limits Strength: Generally decreased, functional 
         
  
Posture: 
  
Balance: 
Sitting: Intact Bed Mobility: 
Supine to Sit: Stand-by assistance Sit to Supine: Stand-by assistance Wheelchair Mobility: 
  
Transfers: 
  
Gait: 
  
   
  
Body Structures Involved: 1. Heart 2. Lungs 3. Muscles Body Functions Affected: 1. Mental 
2. Cardio 3. Respiratory 4. Neuromusculoskeletal 
5. Movement Related Activities and Participation Affected: 1. Learning and Applying Knowledge 2. General Tasks and Demands 3. Mobility 4. Self Care 5. Domestic Life 6. Community, Social and [place] Number of elements that affect the Plan of Care: 4+: HIGH COMPLEXITY CLINICAL PRESENTATION:  
 Presentation: Evolving clinical presentation with changing clinical characteristics: MODERATE COMPLEXITY CLINICAL DECISION MAKING:  
[facility] [place] [facility] AM-PAC 6 Clicks Basic Mobility Inpatient Short Form How much difficulty does the patient currently have. .. Unable A Lot A Little None 1. Turning over in bed (including adjusting bedclothes, sheets and blankets)? [] 1   [] 2   [] 3   [x] 4  
2. Sitting down on and standing up from a chair with arms ( e.g., wheelchair, bedside commode, etc.)   [] 1   [] 2   [x] 3   [] 4  
3. Moving from lying on back to sitting on the side of the bed? [] 1   [] 2   [] 3   [x] 4 How much help from another person does the patient currently need. .. Total A Lot A Little None 4. Moving to and from a bed to a chair (including a wheelchair)? [] 1   [] 2   [x] 3   [] 4  
5. Need to walk in hospital room? [] 1   [] 2   [x] 3   [] 4  
6. Climbing 3-5 steps with a railing? [] 1   [] 2   [x] 3   [] 4  
© 2007, Trustees of [facility] [place] [employer], under license to [employer]. All rights reserved Score:  Initial: 20 Most Recent: X (Date: -- ) Interpretation of Tool:  Represents activities that are increasingly more difficult (i.e. Bed mobility, Transfers, Gait). Score 24 23 22-20 19-15 14-10 9-7 6 Modifier CH CI CJ CK CL CM CN   
 
? Mobility - Walking and Moving Around:  
  - CURRENT STATUS: CJ - 20%-39% impaired, limited or restricted  - GOAL STATUS: CJ - 20%-39% impaired, limited or restricted  - D/C STATUS:  CJ - 20%-39% impaired, limited or restricted Payor: FIRST CHO[employer] VIP CARE PLUS / [place] FIRST CHO[employer] VIP CARE PLUS / Product Type: [employer] Care Medicare /   
 
  
Use of outcome tool(s) and clinical judgement create a POC that gives a: Questionable prediction of patient's progress: MODERATE COMPLEXITY  
  
 
 
 
TREATMENT:  
(In addition to Assessment/Re-Assessment sessions the following treatments were rendered) Pre-treatment Symptoms/Complaints:  Confused Pain: Initial:  
Pain Intensity 1: 0  Post Session:  8/10 (generalized) Assessment/Reassessment only, no treatment provided today Braces/Orthotics/Lines/Etc:  
· O2 Device: Nasal cannula Treatment/Session Assessment:   
· Response to Treatment:  Tolerated poorly given confusion and agitation. · Interdisciplinary Collaboration:  
o Physical Therapist 
o Occupational Therapist 
o Registered Nurse 
o  · After treatment position/precautions:  
o Supine in bed 
o Bed/Chair-wheels locked 
o Bed in low position 
o RN notified 
o Side rails x 3  
· Recommendations/Intent for next treatment session: Will discharge from PT at this time due to non-compliance. Please re-consult if status changes. Total Treatment Duration: PT Patient Time In/Time Out Time In: 1777 Time Out: 7176 Dayna Hollins PT, DPT

## 2018-11-15 NOTE — PROGRESS NOTES
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 Source of bacteremia unknown · Treated [facility] BC+10/11/18, treated with 7 days IV/7 days PO · Blood cx : both sets with Gamma strep · AMS: delirium vs other · COPD: on chronic prednisone · Hx NSTEMI s/p PCI 10/2018 · Prior cholecystectomy Plan:  
 
· Continue  Ampicillin continuous infusion with Ceftriaxone 2g IV Q12h for synergy: anticipate prolonged course 6 weeks once establish start date. · Repeat BC today. Follow results · Mental status is improved today again. Expect this reflects effective therapy of infection. · WBC up a little today but had bee trending down. Fever down. Repeat CBC in AM 
 
Anti-infectives: · Ampicillin - · Ceftriaxone - · Vanc - Subjective:  
 
Alert and oriented x 3; calm and without complaint but feels down. Still diarrhea but only 2 x yesterday Review of Systems:  A comprehensive review of systems was negative except for that written in the History of Present Illness. Objective:  
 
Visit Vitals /74 (BP 1 Location: Left arm, BP Patient Position: At rest) Pulse 64 Temp 97.6 °F (36.4 °C) Resp 16 Wt 85.9 kg (189 lb 4.8 oz) SpO2 99% BMI 28.78 kg/m² Temp (24hrs), Av.6 °F (36.4 °C), Min:96.9 °F (36.1 °C), Max:98.4 °F (36.9 °C) Lines:  none Physical Exam:  
General:  Alert, cooperative, well noursished, well developed, appears stated age Eyes:  Sclera anicteric. Pupils equally round and reactive to light, splinter hemorrhages on left conjunctiva Mouth/Throat: Mucous membranes normal, oral pharynx clear Neck: Supple, symmetric Lungs:   Clear to auscultation bilaterally, good effort CV:  Regular rate and rhythm,no murmur, click, rub or gallop Abdomen:   Soft, non-tender. bowel sounds normal. non-distended Extremities: No cyanosis or edema Skin: Scattered ecchymosis and bruising; arm edema bilaterally about same. Lymph nodes: Cervical and supraclavicular normal  
Musculoskeletal: No swelling or deformity, no nail hemorrhages Lines/Devices:  No lines Psych: Alert and oriented, normal mood affect given the setting; much calmer Data Review: CBC:  
Recent Labs 11/15/18 
0159 11/14/18 
0410 11/13/18 
3721 WBC 14.1* 11.8* 12.9*  
RBC 3.22* 3.08* 3.08* HGB 10.0* 9.3* 9.4* HCT 30.2* 29.2* 28.7*  
 254 248 GRANS 77 74 74 LYMPH 11* 13 11* EOS 1 2 2 CMP:  
Recent Labs 11/15/18 
0504 11/14/18 
0410 11/13/18 
0003 * 75 79  140 139  
K 3.5 3.2* 3.8 * 114* 112* CO2 18* 17* 20* BUN 9 12 15 CREA 0.97 0.84  0.82 1.05  
CA 7.4* 7.4* 7.8* AGAP 11 9 7 AP  --  147*  --   
TP  --  5.4*  --   
ALB  --  2.1*  --   
GLOB  --  3.3  --   
AGRAT  --  0.6*  -- Liver Enzymes:  
Recent Labs 11/14/18 
0410 TP 5.4* ALB 2.1* * SGOT 40* Inflammation studies: No results found for: SR, ESRA, CRP Microbiology:  
 
 
Imaging:  
 
 
Signed By: Alexia Chambers MD   
 November 15, 2018

## 2018-11-15 NOTE — PROGRESS NOTES
Bedside and Verbal shift change report given to Yo Odom RN (oncoming nurse) by self (offgoing nurse). Report included the following information SBAR, Kardex, MAR and Med Rec Status.

## 2018-11-15 NOTE — PROGRESS NOTES
Bedside and Verbal shift change report given to self (oncoming nurse) by Alessandra Mary RN (offgoing nurse). Report included the following information SBAR, Kardex, Intake/Output, MAR, Recent Results and Cardiac Rhythm NSR-sinus bradycardia.

## 2018-11-15 NOTE — PROGRESS NOTES
Bedside and Verbal shift change report given to self (oncoming nurse) by Yann Elizalde RN (offgoing nurse). Report included the following information SBAR, Kardex, MAR and Recent Results.

## 2018-11-15 NOTE — PROGRESS NOTES
11/15/2018 8:27 AM 
 
Admit Date: 11/12/2018 Admit Diagnosis: COPD Exacerbation; Sepsis;Endocarditis Subjective: No cp or sob Objective:  
  
Visit Vitals /88 Pulse 76 Temp 96.9 °F (36.1 °C) Resp 18 Wt 85.9 kg (189 lb 4.8 oz) SpO2 98% BMI 28.78 kg/m² Physical Exam: 
Fatimah Bolls, Well Nourished, No Acute Distress, Alert & Oriented x 3, appropriate mood. Neck- supple, no JVD 
CV- regular rate and rhythm no MRG Lung- clear bilaterally Abd- soft, nontender, nondistended Ext- no edema bilaterally. Skin- warm and dry Data Review:  
Recent Labs 11/15/18 
3991   
K 3.5 BUN 9  
CREA 0.97 WBC 14.1* HGB 10.0* HCT 30.2*  INR 1.9 Assessment/Plan: Active Problems: 
  Coronary atherosclerosis of native coronary artery Stable. Continue current medical therapy. (10/22/2015) Mixed hyperlipidemia (10/22/2015) Essential hypertension (10/22/2015)Stable. Continue current medical therapy. S/P AVR (aortic valve replacement) (5/25/2016)- endocarditis- long term abx per ID- no signs of chf- will sign off- appreciate hospitalists accepting patient COPD (chronic obstructive pulmonary disease) ([facility] [address] (9/19/2018) Acquired hypothyroidism (9/23/2018) Anticoagulated on Coumadin (9/24/2018) Overview: For aortic valve. Leukocytosis (10/22/2018) Endocarditis (11/12/2018)- not redo surgery candidate

## 2018-11-15 NOTE — PROGRESS NOTES
PT Note: PT orders received/reviewed and evaluation attempted. Patient was refusing to work with PT when asked. Evaluation will be re-attempted as schedule and patient's status allow. Thanks, Darlene Tolbert, PT, DPT

## 2018-11-15 NOTE — PROGRESS NOTES
Problem: Falls - Risk of 
Goal: *Absence of Falls Document Clifm Furth Fall Risk and appropriate interventions in the flowsheet. Outcome: Progressing Towards Goal 
Fall Risk Interventions: 
Mobility Interventions: Bed/chair exit alarm Mentation Interventions: Bed/chair exit alarm Medication Interventions: Bed/chair exit alarm Elimination Interventions: Bed/chair exit alarm, Call light in reach, Urinal in reach

## 2018-11-15 NOTE — PROGRESS NOTES
Hospitalist Progress Note Admit Date:  2018  7:40 PM  
Name:  Julio Morales Age:  68 y.o. 
:  1945 MRN:  [id number] PCP:  Rishabh Hussein MD 
Treatment Team: Attending Provider: Freddy Nunez DO; Consulting Provider: Elliot Manzo MD; Care Manager: Josy Soto; Utilization Review: Ishaan Schuster; Consulting Provider: Leandro Cuevas MD; Consulting Provider: Freddy Nunez DO Subjective:  
 
From medical services initial consultation: 
  
Antonio Womack is a 68 y. o. male with a past medical history of COPD, CAD with NSTEMI in 10/18 s/p PCI, AS with [employer] Sigifredo valve replacement on Warfarin and Brilinta, HTN who presents as a transfer from [facility] to the cardiology service with hypotension, bacteremia, and concern for endocarditis/cardiac infection. Today: No new complaints still weak. No chest pain . Mental status continues to improve. Dyspnea improving. Objective:  
 
Patient Vitals for the past 24 hrs: 
 Temp Pulse Resp BP SpO2  
11/15/18 1307 97.3 °F (36.3 °C) 64 16 122/67 98 % 11/15/18 1231     100 % 11/15/18 0924 97.6 °F (36.4 °C) 64 16 132/74 99 % 11/15/18 0819  76  137/88   
11/15/18 0759     98 % 11/15/18 0430 96.9 °F (36.1 °C) 75 18 118/73 99 % 11/15/18 0109 97.1 °F (36.2 °C) 74 18 117/72 98 % 18 2123 97.5 °F (36.4 °C) 76 20 135/68 100 % 18 1705 98.4 °F (36.9 °C) 82 20 154/83 99 % Oxygen Therapy O2 Sat (%): 98 % (11/15/18 1307) Pulse via Oximetry: 80 beats per minute (11/15/18 1231) O2 Device: Nasal cannula (11/15/18 161) O2 Flow Rate (L/min): 2 l/min (11/15/18 161) O2 Temperature: 87.8 °F (31 °C) (18) FIO2 (%): 30 % (18) Intake/Output Summary (Last 24 hours) at 11/15/2018 1622 Last data filed at 11/15/2018 9634 Gross per 24 hour Intake 1130 ml Output 475 ml Net 655 ml Physical Examination: General:    Weak, more alert and conversant Head:  Normocephalic, atraumatic Eyes:  Extraocular movements intact, normal sclera CV:   RRR. Valve click a.v. Noted no gross gallop Lungs:   Unlabored, no cyanosis Abdomen:   Soft, nondistended, nontender. Extremities: Warm and dry. No cyanosis or edema. Skin:     No rashes or jaundice. Neuro:  No gross focal deficits Psych:  Mood and affect appropriate Data Review: 
I have reviewed all labs, meds, telemetry events, and studies from the last 24 hours. Recent Results (from the past 24 hour(s)) CBC WITH AUTOMATED DIFF Collection Time: 11/15/18  5:04 AM  
Result Value Ref Range WBC 14.1 (H) 4.3 - 11.1 K/uL  
 RBC 3.22 (L) 4.23 - 5.6 M/uL  
 HGB 10.0 (L) 13.6 - 17.2 g/dL HCT 30.2 (L) 41.1 - 50.3 % MCV 93.8 79.6 - 97.8 FL  
 MCH 31.1 26.1 - 32.9 PG  
 MCHC 33.1 31.4 - 35.0 g/dL  
 RDW 14.6 % PLATELET 086 058 - 016 K/uL MPV 9.7 9.4 - 12.3 FL ABSOLUTE NRBC 0.00 0.0 - 0.2 K/uL  
 DF AUTOMATED NEUTROPHILS 77 43 - 78 % LYMPHOCYTES 11 (L) 13 - 44 % MONOCYTES 8 4.0 - 12.0 % EOSINOPHILS 1 0.5 - 7.8 % BASOPHILS 0 0.0 - 2.0 % IMMATURE GRANULOCYTES 3 0.0 - 5.0 %  
 ABS. NEUTROPHILS 10.9 (H) 1.7 - 8.2 K/UL  
 ABS. LYMPHOCYTES 1.5 0.5 - 4.6 K/UL  
 ABS. MONOCYTES 1.1 0.1 - 1.3 K/UL  
 ABS. EOSINOPHILS 0.1 0.0 - 0.8 K/UL  
 ABS. BASOPHILS 0.0 0.0 - 0.2 K/UL  
 ABS. IMM. GRANS. 0.4 0.0 - 0.5 K/UL PROTHROMBIN TIME + INR Collection Time: 11/15/18  5:04 AM  
Result Value Ref Range Prothrombin time 20.9 (H) 11.5 - 14.5 sec INR 1.9 METABOLIC PANEL, BASIC Collection Time: 11/15/18  5:04 AM  
Result Value Ref Range Sodium 140 136 - 145 mmol/L Potassium 3.5 3.5 - 5.1 mmol/L Chloride 111 (H) 98 - 107 mmol/L  
 CO2 18 (L) 21 - 32 mmol/L Anion gap 11 7 - 16 mmol/L Glucose 112 (H) 65 - 100 mg/dL BUN 9 8 - 23 MG/DL Creatinine 0.97 0.8 - 1.5 MG/DL  
 GFR est AA >60 >60 ml/min/1.73m2 GFR est non-AA >60 >60 ml/min/1.73m2 Calcium 7.4 (L) 8.3 - 10.4 MG/DL All Micro Results Procedure Component Value Units Date/Time CULTURE, BLOOD, PERIPHERAL [[id number]] Order Status:  Sent Specimen:  Blood CULTURE, BLOOD, PERIPHERAL [[id number]] Order Status:  Canceled Specimen:  Blood CULTURE, BLOOD [[id number]]  (Abnormal) Collected:  11/13/18 1526 Order Status:  Completed Specimen:  Whole Blood Updated:  11/15/18 8874 Special Requests: --     
  LEFT 
HAND 
  
  GRAM STAIN GRAM POSITIVE COCCI AEROBIC BOTTLE POSITIVE CRITICAL RESULT NOT CALLED DUE TO PREVIOUS NOTIFICATION OF CRITICAL RESULT WITHIN THE LAST 24 HOURS. Culture result: GAMMA STREPTOCOCCUS     
   REFER TO ACC NO [id number] FOR ID AND SUSCEPTIBILITY CULTURE, BLOOD [[id number]]  (Abnormal) Collected:  11/13/18 1522 Order Status:  Completed Specimen:  Whole Blood Updated:  11/15/18 0640 Special Requests: --     
  RIGHT 
HAND 
  
  GRAM STAIN GRAM POSITIVE COCCI AEROBIC BOTTLE POSITIVE RESULTS VERIFIED, PHONED TO AND READ BACK BY Karine Wong ON 11/14/18 @0949, ADS Culture result: GAMMA STREPTOCOCCUS IDENTIFICATION AND SUSCEPTIBILITY TO FOLLOW QUANTIFERON-TB GOLD PLUS [[id number]] Collected:  11/14/18 1054 Order Status:  Completed Specimen:  Blood Updated:  11/14/18 1119 Current Meds: 
Current Facility-Administered Medications Medication Dose Route Frequency  enoxaparin (LOVENOX) injection 90 mg  1 mg/kg SubCUTAneous Q12H  warfarin (COUMADIN) tablet 2 mg  2 mg Oral QPM  
 HYDROcodone-acetaminophen (NORCO) 7.5-325 mg per tablet 1 Tab  1 Tab Oral Q6H PRN  
 nystatin (MYCOSTATIN) 100,000 unit/gram powder   Topical TID PRN  
 albuterol (PROVENTIL VENTOLIN) nebulizer solution 2.5 mg  2.5 mg Nebulization Q4H PRN  
 lidocaine (XYLOCAINE) 2 % viscous solution 15 mL  15 mL Mouth/Throat PRN  
  ampicillin (OMNIPEN) 2 g in 0.9% sodium chloride (MBP/ADV) 100 mL  2 g IntraVENous Q4H  
 cefTRIAXone (ROCEPHIN) 2 g in 0.9% sodium chloride (MBP/ADV) 50 mL  2 g IntraVENous Q12H  
 atorvastatin (LIPITOR) tablet 40 mg  40 mg Oral DAILY  famotidine (PEPCID) tablet 40 mg  40 mg Oral BID  levothyroxine (SYNTHROID) tablet 25 mcg  25 mcg Oral ACB  lisinopril (PRINIVIL, ZESTRIL) tablet 2.5 mg  2.5 mg Oral DAILY  pantoprazole (PROTONIX) tablet 40 mg  40 mg Oral ACB  ticagrelor (BRILINTA) tablet 90 mg  90 mg Oral Q12H  
 tiotropium (SPIRIVA) inhalation capsule 18 mcg  1 Cap Inhalation DAILY  topiramate (TOPAMAX) tablet 100 mg  100 mg Oral DAILY  venlafaxine-SR (EFFEXOR-XR) capsule 75 mg  75 mg Oral QHS  tamsulosin (FLOMAX) capsule 0.4 mg  0.4 mg Oral DAILY  predniSONE (DELTASONE) tablet 10 mg  10 mg Oral DAILY WITH BREAKFAST  metoprolol succinate (TOPROL-XL) XL tablet 25 mg  25 mg Oral DAILY  nitroglycerin (NITROSTAT) tablet 0.4 mg  0.4 mg SubLINGual Q5MIN PRN  
 sodium chloride (NS) flush 5-10 mL  5-10 mL IntraVENous Q8H  
 sodium chloride (NS) flush 5-10 mL  5-10 mL IntraVENous PRN  
 morphine injection 2 mg  2 mg IntraVENous Q4H PRN Diet: DIET CARDIAC Other Studies (last 24 hours): No results found. Assessment and Plan:  
 
Hospital Problems as of 11/15/2018 Date Reviewed: 10/3/2018 Codes Class Noted - Resolved POA Endocarditis ICD-10-CM: I38 
ICD-9-CM: 424.90  11/12/2018 - Present Unknown Leukocytosis ICD-10-CM: Z16.031 ICD-9-CM: 288.60  10/22/2018 - Present Yes Anticoagulated on Coumadin (Chronic) ICD-10-CM: Z51.81, Z79.01 
ICD-9-CM: V58.83, V58.61  9/24/2018 - Present Yes Overview Signed 9/24/2018  3:02 PM by Roxy Melendez NP For aortic valve. Acquired hypothyroidism (Chronic) ICD-10-CM: E03.9 ICD-9-CM: 244.9  9/23/2018 - Present Yes  COPD (chronic obstructive pulmonary disease) (HCC) (Chronic) ICD-10-CM: J44.9 ICD-9-CM: 586  9/19/2018 - Present Yes S/P AVR (aortic valve replacement) (Chronic) ICD-10-CM: Z95.2 ICD-9-CM: V43.3  5/25/2016 - Present Yes Coronary atherosclerosis of native coronary artery (Chronic) ICD-10-CM: I25.10 ICD-9-CM: 414.01  10/22/2015 - Present Yes Mixed hyperlipidemia (Chronic) ICD-10-CM: S60.9 ICD-9-CM: 272.2  10/22/2015 - Present Yes Essential hypertension (Chronic) ICD-10-CM: I10 
ICD-9-CM: 401.9  10/22/2015 - Present Yes A/P:   
Aortic mech.prosthetic valve infectious endocarditis- 
Enterococcal --ampicillin rocephin ID following Relapse Not a candidate for redo surgery per cardiology Metabolic encephalopathy on admission-- continues to improve with treatment for recurrent endocarditis--follow Coronary atherosclerosis of native coronary artery Stable. . 
 (10/22/2015) 
  Mixed hyperlipidemia (10/22/2015) 
  Essential hypertension (10/22/2015)Stable. Controlled continue current meds 
  
  COPD (chronic obstructive pulmonary disease) ([facility] [address] (9/19/2018) 
  Acquired hypothyroidism (9/23/2018)-home med 
  
  Anticoagulated on Coumadin (9/24/2018) aortic valve. --pharmacy managing--bridge with lovenox until inr at goal--caution-current brillinta, warfarin and lovenox bridge. INR today 1.9, will stop lovenox unless inr lower tomorrow. If INR lower will restart  
  
 
 dvt prophy: warfarin Dispo:  Discharge delay re: IV antibiotic AVIE-- 
FULL code status

## 2018-11-15 NOTE — PROGRESS NOTES
Problem: Falls - Risk of 
Goal: *Absence of Falls Document Yann Fregoso Fall Risk and appropriate interventions in the flowsheet. Outcome: Progressing Towards Goal 
Fall Risk Interventions: 
Mobility Interventions: Bed/chair exit alarm, Patient to call before getting OOB, Strengthening exercises (ROM-active/passive) Mentation Interventions: Bed/chair exit alarm, Adequate sleep, hydration, pain control, Evaluate medications/consider consulting pharmacy Medication Interventions: Bed/chair exit alarm, Patient to call before getting OOB, Teach patient to arise slowly Elimination Interventions: Bed/chair exit alarm, Call light in reach, Patient to call for help with toileting needs, Urinal in reach

## 2018-11-15 NOTE — PROGRESS NOTES
Warfarin dosing per pharmacist 
 
Laurita Scott is a 68 y.o. male. Weight: 85.9 kg (189 lb 4.8 oz) Indication:  Mechanical AVR Goal INR:  2.5 to 3.5 per [facility] cardiology coumadin clinic notes from 11/9/18 at which time INR was 4.3 Home dose:  2 mg on MWF, 1 mg all other days of the week per [facility] cardiology note from 11/9/18 Risk factors or significant drug interactions:  Elevated INR on admission, on prednisone and ticagrelor Other anticoagulants:  Lovenox bridge (started 11/15) Daily Monitoring Date  INR     Warfarin dose HGB              Notes 11/12  4.0  ?  11.0 
11/13  4.4  Hold  9.4   
11/14  4.1  Hold  9.3 
11/15  1.9  2 mg  10.0 Pharmacy consulted to assist dosing warfarin in patient with past medical history of mechanical AVR. Patient initially presented with a supratherapeutic INR. INR has fallen to 1.9 today. Discussed with Dr. Melchor Jones. Will bridge with Lovenox while INR is subtherapeutic, as patient has a mechanical AVR. Restart warfarin at 2mg tonight. May eventually need a lower dose of 1mg. Will continue to follow daily. Thank you, Quentin Medina, Luis AlbertoD

## 2018-11-15 NOTE — PROGRESS NOTES
Medical services to assume primary patient care responsibilities --service to transfer. Pt seen and examined. Known to me from prior visit. Endocarditis confirmed and ID consultant notes reviewed. From medical services initial consultation: 
 
Alexis Barrios is a 68 y.o. male with a past medical history of COPD, CAD with NSTEMI in 10/18 s/p PCI, AS with [employer] Sigifredo valve replacement on Warfarin and Brilinta, HTN who presents as a transfer from [facility] to the cardiology service with hypotension, bacteremia, and concern for endocarditis/cardiac infection.

## 2018-11-16 ENCOUNTER — HOSPITAL ENCOUNTER (OUTPATIENT)
Age: 73
Discharge: HOME | End: 2019-01-01
Attending: INTERNAL MEDICINE | Admitting: INTERNAL MEDICINE

## 2018-11-16 VITALS
HEART RATE: 70 BPM | TEMPERATURE: 97.4 F | RESPIRATION RATE: 16 BRPM | DIASTOLIC BLOOD PRESSURE: 68 MMHG | WEIGHT: 192.4 LBS | SYSTOLIC BLOOD PRESSURE: 118 MMHG | BODY MASS INDEX: 29.25 KG/M2 | OXYGEN SATURATION: 100 %

## 2018-11-16 PROBLEM — I33.0 INFECTIVE ENDOCARDITIS OF AORTIC VALVE: Status: ACTIVE | Noted: 2018-11-16

## 2018-11-16 PROBLEM — G89.29 CHRONIC PAIN: Status: ACTIVE | Noted: 2018-11-16

## 2018-11-16 PROBLEM — G93.41 ACUTE METABOLIC ENCEPHALOPATHY: Status: ACTIVE | Noted: 2018-11-16

## 2018-11-16 LAB
ANION GAP SERPL CALC-SCNC: 8 MMOL/L (ref 7–16)
BACTERIA SPEC CULT: ABNORMAL
BASOPHILS # BLD: 0 K/UL (ref 0–0.2)
BASOPHILS NFR BLD: 0 % (ref 0–2)
BUN SERPL-MCNC: 7 MG/DL (ref 8–23)
CALCIUM SERPL-MCNC: 7.5 MG/DL (ref 8.3–10.4)
CHLORIDE SERPL-SCNC: 114 MMOL/L (ref 98–107)
CO2 SERPL-SCNC: 19 MMOL/L (ref 21–32)
CREAT SERPL-MCNC: 0.85 MG/DL (ref 0.8–1.5)
DIFFERENTIAL METHOD BLD: ABNORMAL
EOSINOPHIL # BLD: 0.2 K/UL (ref 0–0.8)
EOSINOPHIL NFR BLD: 1 % (ref 0.5–7.8)
ERYTHROCYTE [DISTWIDTH] IN BLOOD BY AUTOMATED COUNT: 14.6 %
GLUCOSE SERPL-MCNC: 95 MG/DL (ref 65–100)
GRAM STN SPEC: ABNORMAL
HCT VFR BLD AUTO: 27.5 % (ref 41.1–50.3)
HGB BLD-MCNC: 9.3 G/DL (ref 13.6–17.2)
IMM GRANULOCYTES # BLD: 0.6 K/UL (ref 0–0.5)
IMM GRANULOCYTES NFR BLD AUTO: 5 % (ref 0–5)
INR PPP: 3.4
LYMPHOCYTES # BLD: 1.7 K/UL (ref 0.5–4.6)
LYMPHOCYTES NFR BLD: 13 % (ref 13–44)
MCH RBC QN AUTO: 31.4 PG (ref 26.1–32.9)
MCHC RBC AUTO-ENTMCNC: 33.8 G/DL (ref 31.4–35)
MCV RBC AUTO: 92.9 FL (ref 79.6–97.8)
MONOCYTES # BLD: 0.9 K/UL (ref 0.1–1.3)
MONOCYTES NFR BLD: 7 % (ref 4–12)
NEUTS SEG # BLD: 9.9 K/UL (ref 1.7–8.2)
NEUTS SEG NFR BLD: 75 % (ref 43–78)
NRBC # BLD: 0 K/UL (ref 0–0.2)
PLATELET # BLD AUTO: 290 K/UL (ref 150–450)
PMV BLD AUTO: 9.6 FL (ref 9.4–12.3)
POTASSIUM SERPL-SCNC: 3.4 MMOL/L (ref 3.5–5.1)
PROTHROMBIN TIME: 32.6 SEC (ref 11.5–14.5)
RBC # BLD AUTO: 2.96 M/UL (ref 4.23–5.6)
SERVICE CMNT-IMP: ABNORMAL
SERVICE CMNT-IMP: ABNORMAL
SODIUM SERPL-SCNC: 141 MMOL/L (ref 136–145)
WBC # BLD AUTO: 13.2 K/UL (ref 4.3–11.1)

## 2018-11-16 PROCEDURE — 74011000250 HC RX REV CODE- 250: Performed by: NURSE PRACTITIONER

## 2018-11-16 PROCEDURE — 87040 BLOOD CULTURE FOR BACTERIA: CPT

## 2018-11-16 PROCEDURE — 94640 AIRWAY INHALATION TREATMENT: CPT

## 2018-11-16 PROCEDURE — 85025 COMPLETE CBC W/AUTO DIFF WBC: CPT

## 2018-11-16 PROCEDURE — 74011000258 HC RX REV CODE- 258: Performed by: NURSE PRACTITIONER

## 2018-11-16 PROCEDURE — 80048 BASIC METABOLIC PNL TOTAL CA: CPT

## 2018-11-16 PROCEDURE — 74011250637 HC RX REV CODE- 250/637: Performed by: INTERNAL MEDICINE

## 2018-11-16 PROCEDURE — 74011636637 HC RX REV CODE- 636/637: Performed by: NURSE PRACTITIONER

## 2018-11-16 PROCEDURE — 85610 PROTHROMBIN TIME: CPT

## 2018-11-16 PROCEDURE — 36415 COLL VENOUS BLD VENIPUNCTURE: CPT

## 2018-11-16 PROCEDURE — 74011250637 HC RX REV CODE- 250/637: Performed by: NURSE PRACTITIONER

## 2018-11-16 PROCEDURE — 77010033678 HC OXYGEN DAILY

## 2018-11-16 PROCEDURE — 74011250636 HC RX REV CODE- 250/636: Performed by: NURSE PRACTITIONER

## 2018-11-16 RX ORDER — HYDROCODONE BITARTRATE AND ACETAMINOPHEN 5; 325 MG/1; MG/1
1 TABLET ORAL
Status: DISCONTINUED | OUTPATIENT
Start: 2018-11-16 | End: 2018-11-16 | Stop reason: HOSPADM

## 2018-11-16 RX ORDER — WARFARIN 1 MG/1
1 TABLET ORAL EVERY EVENING
Status: DISCONTINUED | OUTPATIENT
Start: 2018-11-16 | End: 2018-11-16 | Stop reason: HOSPADM

## 2018-11-16 RX ORDER — ALBUTEROL SULFATE 0.83 MG/ML
2.5 SOLUTION RESPIRATORY (INHALATION)
Qty: 24 EACH | Refills: 0 | Status: SHIPPED | OUTPATIENT
Start: 2018-11-16

## 2018-11-16 RX ORDER — POTASSIUM CHLORIDE 20 MEQ/1
20 TABLET, EXTENDED RELEASE ORAL DAILY
Qty: 30 TAB | Refills: 0 | Status: SHIPPED | OUTPATIENT
Start: 2018-11-17

## 2018-11-16 RX ORDER — POTASSIUM CHLORIDE 20 MEQ/1
20 TABLET, EXTENDED RELEASE ORAL DAILY
Status: DISCONTINUED | OUTPATIENT
Start: 2018-11-17 | End: 2018-11-16 | Stop reason: HOSPADM

## 2018-11-16 RX ORDER — HYDROCODONE BITARTRATE AND ACETAMINOPHEN 5; 325 MG/1; MG/1
1 TABLET ORAL
Qty: 18 TAB | Refills: 0 | Status: SHIPPED | OUTPATIENT
Start: 2018-11-16 | End: 2019-01-17

## 2018-11-16 RX ORDER — POTASSIUM CHLORIDE 20 MEQ/1
40 TABLET, EXTENDED RELEASE ORAL
Status: COMPLETED | OUTPATIENT
Start: 2018-11-16 | End: 2018-11-16

## 2018-11-16 RX ORDER — WARFARIN 1 MG/1
1 TABLET ORAL EVERY EVENING
Qty: 30 TAB | Refills: 0 | Status: SHIPPED | OUTPATIENT
Start: 2018-11-16 | End: 2019-12-27 | Stop reason: SDUPTHER

## 2018-11-16 RX ADMIN — TIOTROPIUM BROMIDE 18 MCG: 18 CAPSULE ORAL; RESPIRATORY (INHALATION) at 08:23

## 2018-11-16 RX ADMIN — ALBUTEROL SULFATE 2.5 MG: 2.5 SOLUTION RESPIRATORY (INHALATION) at 08:23

## 2018-11-16 RX ADMIN — AMPICILLIN SODIUM 2 G: 2 INJECTION, POWDER, FOR SOLUTION INTRAMUSCULAR; INTRAVENOUS at 07:59

## 2018-11-16 RX ADMIN — AMPICILLIN SODIUM 2 G: 2 INJECTION, POWDER, FOR SOLUTION INTRAMUSCULAR; INTRAVENOUS at 00:35

## 2018-11-16 RX ADMIN — FAMOTIDINE 40 MG: 20 TABLET ORAL at 08:00

## 2018-11-16 RX ADMIN — ATORVASTATIN CALCIUM 40 MG: 40 TABLET, FILM COATED ORAL at 08:00

## 2018-11-16 RX ADMIN — AMPICILLIN SODIUM 2 G: 2 INJECTION, POWDER, FOR SOLUTION INTRAMUSCULAR; INTRAVENOUS at 04:29

## 2018-11-16 RX ADMIN — Medication 10 ML: at 05:37

## 2018-11-16 RX ADMIN — METOPROLOL SUCCINATE 25 MG: 25 TABLET, EXTENDED RELEASE ORAL at 08:01

## 2018-11-16 RX ADMIN — LISINOPRIL 2.5 MG: 5 TABLET ORAL at 08:01

## 2018-11-16 RX ADMIN — AMPICILLIN SODIUM 2 G: 2 INJECTION, POWDER, FOR SOLUTION INTRAMUSCULAR; INTRAVENOUS at 11:57

## 2018-11-16 RX ADMIN — TICAGRELOR 90 MG: 90 TABLET ORAL at 08:00

## 2018-11-16 RX ADMIN — POTASSIUM CHLORIDE 40 MEQ: 20 TABLET, EXTENDED RELEASE ORAL at 11:56

## 2018-11-16 RX ADMIN — PREDNISONE 10 MG: 5 TABLET ORAL at 08:00

## 2018-11-16 RX ADMIN — LEVOTHYROXINE SODIUM 25 MCG: 50 TABLET ORAL at 05:36

## 2018-11-16 RX ADMIN — TAMSULOSIN HYDROCHLORIDE 0.4 MG: 0.4 CAPSULE ORAL at 08:00

## 2018-11-16 RX ADMIN — TOPIRAMATE 100 MG: 25 TABLET, FILM COATED ORAL at 08:00

## 2018-11-16 RX ADMIN — CEFTRIAXONE SODIUM 2 G: 2 INJECTION, POWDER, FOR SOLUTION INTRAMUSCULAR; INTRAVENOUS at 05:36

## 2018-11-16 RX ADMIN — HYDROCODONE BITARTRATE AND ACETAMINOPHEN 1 TABLET: 7.5; 325 TABLET ORAL at 10:33

## 2018-11-16 RX ADMIN — PANTOPRAZOLE SODIUM 40 MG: 40 TABLET, DELAYED RELEASE ORAL at 08:00

## 2018-11-16 NOTE — PROGRESS NOTES
Warfarin dosing per pharmacist 
 
Bridgett Key is a 68 y.o. male. Weight: 87.3 kg (192 lb 6.4 oz) Indication:  Mechanical AVR Goal INR:  2.5 to 3.5 per [facility] cardiology coumadin clinic notes from 11/9/18 at which time INR was 4.3 Home dose:  2 mg on MWF, 1 mg all other days of the week per [facility] cardiology note from 11/9/18 Risk factors or significant drug interactions:  Elevated INR on admission, on prednisone and ticagrelor Other anticoagulants:  Lovenox bridge (given on 11/15 while INR subtherapeutic) Daily Monitoring Date  INR     Warfarin dose HGB              Notes 11/12  4.0  ?  11.0 
11/13  4.4  Hold  9.4   
11/14  4.1  Hold  9.3 
11/15  1.9  2 mg  10.0 
11/16  3.4  1 mg Pharmacy consulted to assist dosing warfarin in patient with past medical history of mechanical AVR. Patient initially presented with a supratherapeutic INR. INR at 3.4 today? ?  Question whether yesterday's INR was accurate, as last night's dose would not already be reflected in today's INR. Will give a lower dose of warfarin 1mg tonight. Thank you, Ankit Valderrama, PharmD

## 2018-11-16 NOTE — PROGRESS NOTES
TRANSFER - OUT REPORT: 
 
Verbal report given to Peace Wilkes RN on Yvonne Snell being transferred to [facility] for routine progression of care Report consisted of patients Situation, Background, Assessment and Recommendations (SBAR). Information from the following report(s) SBAR, Kardex, Intake/Output, MAR and Recent Results was reviewed with the receiving nurse. Opportunity for questions and clarification was provided.

## 2018-11-16 NOTE — PROGRESS NOTES
Pt to be dc to [facility], all parties in agreement. No further needs noted. CM to continue to monitor. Care Management Interventions PCP Verified by CM: Yes Mode of Transport at Discharge: Other (see comment) Transition of Care Consult (CM Consult): LTAC([facility]) Discharge Durable Medical Equipment: No 
Physical Therapy Consult: Yes Occupational Therapy Consult: Yes Speech Therapy Consult: No 
Current Support Network: Lives with Spouse, Own Home Confirm Follow Up Transport: Family Plan discussed with Pt/Family/Caregiver: Yes Freedom of Choice Offered: Yes Discharge Location Discharge Placement: [address] [place] [address]

## 2018-11-16 NOTE — PROGRESS NOTES
Bedside and Verbal shift change report given to Joyce Muir RN (oncoming nurse) by self (offgoing nurse). Report included the following information SBAR, Kardex, MAR and Recent Results.

## 2018-11-16 NOTE — DISCHARGE SUMMARY
Hospitalist Discharge Summary     Admit Date:  2018  7:40 PM   Name:  Bridgett Key   Age:  68 y.o.  :  1945   MRN:  [id number]   PCP:  Fernando Orosco MD  Treatment Team: Attending Provider: Jonel Apley, DO; Consulting Provider: Mathew Enriquez MD; Care Manager: Judy Hahn; Utilization Review: Jim Robbins; Consulting Provider: Jesus Slaughter MD; Consulting Provider: Jonel Apley, DO    Problem List for this Hospitalization:  Hospital Problems as of 2018 Date Reviewed: 10/3/2018          Codes Class Noted - Resolved POA    * (Principal) Infective endocarditis of aortic valve ICD-10-CM: I35.8  ICD-9-CM: 424.1  2018 - Present Unknown        Acute metabolic encephalopathy F-90-SQ: G93.41  ICD-9-CM: 348.31  2018 - Present Unknown        Chronic pain ICD-10-CM: G89.29  ICD-9-CM: 338.29  2018 - Present Unknown        Endocarditis ICD-10-CM: I38  ICD-9-CM: 424.90  2018 - Present Unknown        Leukocytosis ICD-10-CM: N41.600  ICD-9-CM: 288.60  10/22/2018 - Present Yes        Anticoagulated on Coumadin (Chronic) ICD-10-CM: Z51.81, Z79.01  ICD-9-CM: V58.83, V58.61  2018 - Present Yes    Overview Signed 2018  3:02 PM by Leobardo Gore NP     For aortic valve.              Acquired hypothyroidism (Chronic) ICD-10-CM: E03.9  ICD-9-CM: 244.9  2018 - Present Yes        COPD (chronic obstructive pulmonary disease) (HCC) (Chronic) ICD-10-CM: J44.9  ICD-9-CM: 496  2018 - Present Yes        S/P AVR (aortic valve replacement) (Chronic) ICD-10-CM: Z95.2  ICD-9-CM: V43.3  2016 - Present Yes        Coronary atherosclerosis of native coronary artery (Chronic) ICD-10-CM: I25.10  ICD-9-CM: 414.01  10/22/2015 - Present Yes        Mixed hyperlipidemia (Chronic) ICD-10-CM: E78.2  ICD-9-CM: 272.2  10/22/2015 - Present Yes        Essential hypertension (Chronic) ICD-10-CM: I10  ICD-9-CM: 401.9  10/22/2015 - Present Yes HPI from admitting cardiologist h and P:    Patient is a 68 y.o. male who has a hx of HLD, recent NSTEMI 10/2018 s/p PCI to LM/Circ/SVG to LAD, with prior CABG 1996 consisting of an SVG to LAD, SVG to RCA, CVA, HTN, COPD, and AS w/ 25 mm St Sigifredo Mechanical.  The patient is currently on Warfarin and Brilinta with a INR of 3.7 on 11/9/2018. The patient was admitted to [facility] on 10/10/2018 with confusion with positive blood cultures. Dr Luma Cabrera accepted the patient for a tranfer for CHRISTINE and further investigation for possible endocarditis and/or cardiac abscess. Our initial consultation HPI from 11/12/2018:    \"Antonio Womack is a 68 y. o. male with a past medical history of COPD, CAD with NSTEMI in 10/18 s/p PCI, AS with St Sigifredo valve replacement on Warfarin and Brilinta, HTN who presents as a transfer from [facility] to the cardiology service with hypotension, bacteremia, and concern for endocarditis/cardiac infection. 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 Small vegetation noted on mechanical aortic valve. Infectious disease treating with iv ampicillin continuous infusion and rocephin 2g iv q12hr. Repeat cultures today and once negative --start date for treatment course duration and schedule long term iv catheter. Hx of NSTEMI recent in 10/2018 with pci but has remained stable. Confusion AMS on admission has improve with treatment for infection. Copd has remained stable through hospital stay. Pharmacy Is managing warfarin re: [facility]. Heart valve. Goal INR 2.5-3.5, Will need continued management by infectious disease dr [place] Ludwig et al. And cardiology as needed re: recent Nonstemi. PT an OT re: strengthening and will need nutrtiional support and respiratory therapy prn. Follow up instructions and discharge meds at bottom of this note. Plan was discussed with patient and spouse. All questions answered.   Patient was stable at time of discharge. Current Discharge Medication List      START taking these medications    Details   albuterol (PROVENTIL VENTOLIN) 2.5 mg /3 mL (0.083 %) nebulizer solution 3 mL by Nebulization route every four (4) hours as needed for Wheezing or Shortness of Breath. Qty: 24 Each, Refills: 0      ampicillin 2 g, ADDaptor 1 Device 2 g by IntraVENous route every four (4) hours. Qty: 180 Dose, Refills: 0      cefTRIAXone 2 gram 2 g, ADDaptor 1 Device IVPB 2 g by IntraVENous route every twelve (12) hours every twelve (12) hours. Qty: 60 Dose, Refills: 0      potassium chloride (K-DUR, KLOR-CON) 20 mEq tablet Take 1 Tab by mouth daily. Qty: 30 Tab, Refills: 0      HYDROcodone-acetaminophen (NORCO) 5-325 mg per tablet Take 1 Tab by mouth every four (4) hours as needed. Max Daily Amount: 6 Tabs. Qty: 18 Tab, Refills: 0    Associated Diagnoses: Other chronic pain         CONTINUE these medications which have CHANGED    Details   warfarin (COUMADIN) 1 mg tablet Take 1 Tab by mouth every evening. Qty: 30 Tab, Refills: 0         CONTINUE these medications which have NOT CHANGED    Details   predniSONE (DELTASONE) 10 mg tablet Take  by mouth daily (with breakfast). metoprolol succinate (TOPROL-XL) 25 mg XL tablet Take 1 Tab by mouth daily. Qty: 30 Tab, Refills: 11      famotidine (PEPCID) 40 mg tablet Take 1 Tab by mouth two (2) times a day. Qty: 60 Tab, Refills: 1      ticagrelor (BRILINTA) 90 mg tablet Take 1 Tab by mouth every twelve (12) hours every twelve (12) hours. Qty: 60 Tab, Refills: 11      omeprazole (PRILOSEC) 40 mg capsule Take 40 mg by mouth daily. lisinopril (PRINIVIL, ZESTRIL) 2.5 mg tablet Take 1 Tab by mouth daily. Qty: 30 Tab, Refills: 11      potassium 99 mg tablet Take 99 mg by mouth daily. MAGNESIUM PO Take  by mouth. tiotropium (SPIRIVA) 18 mcg inhalation capsule Take 1 Cap by inhalation daily.       tamsulosin (FLOMAX) 0.4 mg capsule Take 0.4 mg by mouth daily.      topiramate (TOPAMAX) 100 mg tablet Take 100 mg by mouth daily. venlafaxine-SR (EFFEXOR-XR) 75 mg capsule Take 75 mg by mouth nightly. atorvastatin (LIPITOR) 40 mg tablet Take  by mouth daily. levothyroxine (SYNTHROID) 25 mcg tablet Take  by mouth Daily (before breakfast). nitroglycerin (NITROSTAT) 0.4 mg SL tablet 1 Tab by SubLINGual route every five (5) minutes as needed for Chest Pain. Up to 3 doses. Qty: 1 Bottle, Refills: 5         STOP taking these medications       vitamin a-vitamin c-vit e-min (OCUVITE) tablet Comments:   Reason for Stopping:         fish oil-omega-3 fatty acids 340-1,000 mg capsule Comments:   Reason for Stopping:         aspirin delayed-release 81 mg tablet Comments:   Reason for Stopping:         HYDROcodone-acetaminophen (NORCO) 7.5-325 mg per tablet Comments:   Reason for Stopping:                 Diagnostic Imaging/Tests:   Xr Chest Sngl V    Result Date: 2018  EXAM:  Chest x-ray. DATE:  2018. INDICATION:  Dyspnea. COMPARISON:  2018. TECHNIQUE:  Single frontal view chest. FINDINGS:  The lungs are clear. Again noted is cardiomegaly, a prior sternotomy and a left shoulder arthroplasty. No pneumothorax, pulmonary vascular congestion or pleural effusion is identified. IMPRESSION:  No acute process or interval change.        Echocardiogram results:  Results for orders placed or performed during the hospital encounter of 18   2D ECHO COMPLETE ADULT (TTE) W OR WO CONTR    Narrative    [address]  [phone]    Transthoracic Echocardiogram  2D, M-mode, Doppler, and Color Doppler    Patient: Mayra Musa  MR #: [id number]  : [id number]  Age: 68 years  Gender: Male  Study date: 2018  Account #: [de-identified]  Height: 68 in  Weight: 195.6 lb  BSA: 2.03 mï¾²  Status:Routine  [facility]  BP: 111/ 57    Allergies: FLUTICASONE-SALMETEROL, FLUOXETINE    Sonographer:  Enoch Medley [facility]  Group:  [facility]  Referring Physician:  Leigh Ann Musa MD  Reading Physician:  Isael Garcia. Jeanne Diehl MD [facility]    INDICATIONS: Endocarditis. PROCEDURE: This was a routine study. A transthoracic echocardiogram was  performed. The study included complete 2D imaging, M-mode, complete spectral  Doppler, and color Doppler. Intravenous contrast (Definity) was administered. Image quality was adequate. LEFT VENTRICLE: Size was normal. Systolic function was normal. Ejection  fraction was estimated in the range of 55 % to 60 %. This study was   inadequate  for the evaluation of regional wall motion. Wall thickness was normal. Avg.  E/e'=14.95. The study was not technically sufficient to allow evaluation of   LV  diastolic function due to atrial fibrillation. RIGHT VENTRICLE: The size was normal. Systolic function was normal. Estimated  peak pressure was in the range of 35-40 mmHg. LEFT ATRIUM: The atrium was moderately dilated. RIGHT ATRIUM: Size was normal.    SYSTEMIC VEINS: IVC: The inferior vena cava was dilated. The respirophasic  change in diameter was more than 50%. AORTIC VALVE: A St Sigifredo mechanical prosthesis was present (1996). There was a  possible, small vegetation noted on CHRISTINE (11/13/18). The peak velocity was   5.15  m/s. The mean pressure gradient was 47.78 mmHg. The peak pressure gradient   was  105.94 mmHg. Di= 0.28. SVi= 43.3. AT=52.5. There was trivial regurgitation. MITRAL VALVE: Valve structure was normal. There was no evidence for   vegetation. There was no evidence for stenosis. There was mild regurgitation. TRICUSPID VALVE: There was a small, mobile vegetation on the base of the   septal  leaflet. There was no evidence for stenosis. There was mild to moderate  regurgitation. PULMONIC VALVE: Not well visualized. There was no evidence for stenosis. There  was no insufficiency.     PERICARDIUM: There was no pericardial effusion. AORTA: The root exhibited normal size. There was dilatation of the ascending  aorta. (4.7 cm). SUMMARY:    -  Left ventricle: Systolic function was normal. Ejection fraction was  estimated in the range of 55 % to 60 %. This study was inadequate for the  evaluation of regional wall motion. -  Left atrium: The atrium was moderately dilated. -  Inferior vena cava, hepatic veins: The inferior vena cava was dilated. The  respirophasic change in diameter was more than 50%. -  Aortic valve: A St Sigifredo mechanical prosthesis was present (). There   was  a possible, small vegetation noted on CHRISTINE (18). -  Mitral valve: There was mild regurgitation. There was no evidence for  vegetation.    -  Tricuspid valve: There was mild to moderate regurgitation. There was a  small, mobile vegetation on the base of the septal leaflet. -  Aorta, systemic arteries: There was dilatation of the ascending aorta. (4.7  cm). SYSTEM MEASUREMENT TABLES    2D  Ao Diam: 3.7 cm  LA Diam: 4.1 cm  %FS: 36.5 %  IVSd: 1.2 cm  LVIDd: 4.2 cm  LVIDs: 2.7 cm  LVOT Diam: 2.2 cm  LVPWd: 1.1 cm  RVIDd: 3.4 cm    CW  TR Vmax: 2.7 m/s  TR maxP.1 mmHg    Prepared and signed by    Cordell Bella MD [facility]  Signed [id number] 16:22:27           All Micro Results     Procedure Component Value Units Date/Time    CULTURE, BLOOD [[id number]]  (Abnormal) Collected:  18 1526    Order Status:  Completed Specimen:  Whole Blood Updated:  18 1106     Special Requests: --        LEFT  HAND       GRAM STAIN GRAM POSITIVE COCCI         AEROBIC BOTTLE POSITIVE               CRITICAL RESULT NOT CALLED DUE TO PREVIOUS NOTIFICATION OF CRITICAL RESULT WITHIN THE LAST 24 HOURS.            Culture result: ENTEROCOCCUS SPECIES         REFER TO ACC NO [id number] FOR ID AND SUSCEPTIBILITY    CULTURE, BLOOD [[id number]] Collected:  18 0946    Order Status:  Completed Specimen:  Blood Updated:  18 1021    CULTURE, BLOOD [[id number]] Collected:  11/16/18 0956    Order Status:  Completed Specimen:  Blood Updated:  11/16/18 1021    CULTURE, BLOOD [[id number]]     Order Status:  Canceled Specimen:  Blood     CULTURE, BLOOD [[id number]]  (Abnormal)  (Susceptibility) Collected:  11/13/18 1522    Order Status:  Completed Specimen:  Whole Blood Updated:  11/16/18 0745     Special Requests: --        RIGHT  HAND       GRAM STAIN GRAM POSITIVE COCCI         AEROBIC BOTTLE POSITIVE               RESULTS VERIFIED, PHONED TO AND READ BACK BY MICHAEL Solo ON 11/14/18 @0949, ADS           Culture result:       ENTEROCOCCUS FAECALIS GROUP D          CULTURE, BLOOD, PERIPHERAL [[id number]]     Order Status:  Canceled Specimen:  Blood     CULTURE, BLOOD, PERIPHERAL [[id number]]     Order Status:  Canceled Specimen:  Blood     QUANTIFERON-TB GOLD PLUS [[id number]] Collected:  11/14/18 1054    Order Status:  Completed Specimen:  Blood Updated:  11/14/18 1119          Labs: Results:       BMP, Mg, Phos Recent Labs     11/16/18  0356 11/15/18  0504 11/14/18  0410    140 140   K 3.4* 3.5 3.2*   * 111* 114*   CO2 19* 18* 17*   AGAP 8 11 9   BUN 7* 9 12   CREA 0.85 0.97 0.84  0.82   CA 7.5* 7.4* 7.4*   GLU 95 112* 75      CBC Recent Labs     11/16/18  0356 11/15/18  0504 11/14/18  0410   WBC 13.2* 14.1* 11.8*   RBC 2.96* 3.22* 3.08*   HGB 9.3* 10.0* 9.3*   HCT 27.5* 30.2* 29.2*    286 254   GRANS 75 77 74   LYMPH 13 11* 13   EOS 1 1 2   MONOS 7 8 8   BASOS 0 0 0   IG 5 3 3   ANEU 9.9* 10.9* 8.7*   ABL 1.7 1.5 1.6   GUY 0.2 0.1 0.2   ABM 0.9 1.1 1.0   ABB 0.0 0.0 0.0   AIG 0.6* 0.4 0.4      LFT Recent Labs     11/14/18  0410   SGOT 40*   ALT 42   *   TP 5.4*   ALB 2.1*   GLOB 3.3   AGRAT 0.6*      Cardiac Testing Lab Results   Component Value Date/Time    Troponin-I, Qt. 10.10 () 10/23/2018 03:55 AM    Troponin-I, Qt. 14.40 () 10/22/2018 10:10 PM    Troponin-I, Qt.  09/24/2018 09:17 AM     Elevated troponin which has been previously called.       Coagulation Tests Lab Results   Component Value Date/Time    Prothrombin time 32.6 (H) 11/16/2018 03:56 AM    Prothrombin time 20.9 (H) 11/15/2018 05:04 AM    Prothrombin time 38.1 (H) 11/14/2018 04:10 AM    INR 3.4 11/16/2018 03:56 AM    INR 1.9 11/15/2018 05:04 AM    INR 4.1 (HH) 11/14/2018 04:10 AM    aPTT 90.0 (H) 10/24/2018 03:46 AM    aPTT 109.1 (HH) 10/23/2018 07:44 PM    aPTT 83.0 (H) 10/23/2018 01:26 PM      A1c No results found for: HBA1C, HGBE8, HEK5QOPN, HTA8FBNT   Lipid Panel Lab Results   Component Value Date/Time    Cholesterol, total 104 09/20/2018 04:32 AM    HDL Cholesterol 34 (L) 09/20/2018 04:32 AM    LDL, calculated 47.2 09/20/2018 04:32 AM    VLDL, calculated 22.8 09/20/2018 04:32 AM    Triglyceride 114 09/20/2018 04:32 AM    CHOL/HDL Ratio 3.1 09/20/2018 04:32 AM      Thyroid Panel No results found for: TSH, T4, FT4, TT3, T3U, TSHEXT, TSHEXT     Most Recent UA Lab Results   Component Value Date/Time    Color DEE 10/22/2018 05:29 PM    Appearance CLOUDY 10/22/2018 05:29 PM    Specific gravity 1.022 10/22/2018 05:29 PM    pH (UA) 6.0 10/22/2018 05:29 PM    Protein 30 (A) 10/22/2018 05:29 PM    Glucose NEGATIVE  10/22/2018 05:29 PM    Ketone TRACE (A) 10/22/2018 05:29 PM    Bilirubin NEGATIVE  10/22/2018 05:29 PM    Blood LARGE (A) 10/22/2018 05:29 PM    Urobilinogen 0.2 10/22/2018 05:29 PM    Nitrites NEGATIVE  10/22/2018 05:29 PM    Leukocyte Esterase TRACE (A) 10/22/2018 05:29 PM        Allergies   Allergen Reactions    Advair Diskus [Fluticasone-Salmeterol] Unknown (comments)     rash    Prozac [Fluoxetine] Unable to Obtain     Immunization History   Administered Date(s) Administered    Influenza Vaccine (Quad) PF 09/26/2018       All Labs from Last 24 Hrs:  Recent Results (from the past 24 hour(s))   CBC WITH AUTOMATED DIFF    Collection Time: 11/16/18  3:56 AM   Result Value Ref Range    WBC 13.2 (H) 4.3 - 11.1 K/uL    RBC 2.96 (L) 4.23 - 5.6 M/uL    HGB 9.3 (L) 13.6 - 17.2 g/dL    HCT 27.5 (L) 41.1 - 50.3 %    MCV 92.9 79.6 - 97.8 FL    MCH 31.4 26.1 - 32.9 PG    MCHC 33.8 31.4 - 35.0 g/dL    RDW 14.6 %    PLATELET 058 520 - 529 K/uL    MPV 9.6 9.4 - 12.3 FL    ABSOLUTE NRBC 0.00 0.0 - 0.2 K/uL    DF AUTOMATED      NEUTROPHILS 75 43 - 78 %    LYMPHOCYTES 13 13 - 44 %    MONOCYTES 7 4.0 - 12.0 %    EOSINOPHILS 1 0.5 - 7.8 %    BASOPHILS 0 0.0 - 2.0 %    IMMATURE GRANULOCYTES 5 0.0 - 5.0 %    ABS. NEUTROPHILS 9.9 (H) 1.7 - 8.2 K/UL    ABS. LYMPHOCYTES 1.7 0.5 - 4.6 K/UL    ABS. MONOCYTES 0.9 0.1 - 1.3 K/UL    ABS. EOSINOPHILS 0.2 0.0 - 0.8 K/UL    ABS. BASOPHILS 0.0 0.0 - 0.2 K/UL    ABS. IMM.  GRANS. 0.6 (H) 0.0 - 0.5 K/UL   PROTHROMBIN TIME + INR    Collection Time: 11/16/18  3:56 AM   Result Value Ref Range    Prothrombin time 32.6 (H) 11.5 - 14.5 sec    INR 3.4     METABOLIC PANEL, BASIC    Collection Time: 11/16/18  3:56 AM   Result Value Ref Range    Sodium 141 136 - 145 mmol/L    Potassium 3.4 (L) 3.5 - 5.1 mmol/L    Chloride 114 (H) 98 - 107 mmol/L    CO2 19 (L) 21 - 32 mmol/L    Anion gap 8 7 - 16 mmol/L    Glucose 95 65 - 100 mg/dL    BUN 7 (L) 8 - 23 MG/DL    Creatinine 0.85 0.8 - 1.5 MG/DL    GFR est AA >60 >60 ml/min/1.73m2    GFR est non-AA >60 >60 ml/min/1.73m2    Calcium 7.5 (L) 8.3 - 10.4 MG/DL       Discharge Exam:  Patient Vitals for the past 24 hrs:   Temp Pulse Resp BP SpO2   11/16/18 0823     99 %   11/16/18 0759 97.6 °F (36.4 °C) 67 16 137/67 98 %   11/16/18 0451 97.7 °F (36.5 °C) 87 18 127/65 97 %   11/16/18 0100 98.2 °F (36.8 °C) (!) 54 18 124/71 98 %   11/15/18 2119 97.6 °F (36.4 °C) (!) 55 18 142/70 100 %   11/15/18 1648 97.5 °F (36.4 °C) (!) 50 16 152/71 98 %   11/15/18 1307 97.3 °F (36.3 °C) 64 16 122/67 98 %   11/15/18 1231     100 %     Oxygen Therapy  O2 Sat (%): 99 % (11/16/18 0823)  Pulse via Oximetry: 67 beats per minute (11/16/18 0823)  O2 Device: Nasal cannula (11/16/18 0823)  O2 Flow Rate (L/min): 2 l/min(Home O2) (11/16/18 0823)  O2 Temperature: 87.8 °F (31 °C) (11/14/18 0415)  FIO2 (%): 30 % (11/14/18 0415)    Intake/Output Summary (Last 24 hours) at 11/16/2018 1128  Last data filed at 11/15/2018 2247  Gross per 24 hour   Intake 120 ml   Output 101 ml   Net 19 ml         Physical exam:  General:    Mental status continues to improve  No distress. Eyes:   Normal sclera. Extraocular movements intact. ENT:  Normocephalic, atraumatic. Moist mucous membranes  CV:   No gross gallop, no change in valve sounds, no jvd    Lungs:  Clear to auscultation bilaterally. No wheezing, rhonchi, or rales. --o2  Abdomen: Soft, nontender, nondistended. Bowel sounds normal.   Extremities: Warm and dry. No cyanosis or edema. Neurologic: No focal deficits  Skin:     No rashes or jaundice. Psych:  Normal mood and affect. Discharge Info:   Current Discharge Medication List      START taking these medications    Details   albuterol (PROVENTIL VENTOLIN) 2.5 mg /3 mL (0.083 %) nebulizer solution 3 mL by Nebulization route every four (4) hours as needed for Wheezing or Shortness of Breath. Qty: 24 Each, Refills: 0      ampicillin 2 g, ADDaptor 1 Device 2 g by IntraVENous route every four (4) hours. Qty: 180 Dose, Refills: 0      cefTRIAXone 2 gram 2 g, ADDaptor 1 Device IVPB 2 g by IntraVENous route every twelve (12) hours every twelve (12) hours. Qty: 60 Dose, Refills: 0      potassium chloride (K-DUR, KLOR-CON) 20 mEq tablet Take 1 Tab by mouth daily. Qty: 30 Tab, Refills: 0      HYDROcodone-acetaminophen (NORCO) 5-325 mg per tablet Take 1 Tab by mouth every four (4) hours as needed. Max Daily Amount: 6 Tabs. Qty: 18 Tab, Refills: 0    Associated Diagnoses: Other chronic pain         CONTINUE these medications which have CHANGED    Details   warfarin (COUMADIN) 1 mg tablet Take 1 Tab by mouth every evening.   Qty: 30 Tab, Refills: 0         CONTINUE these medications which have NOT CHANGED    Details   predniSONE (DELTASONE) 10 mg tablet Take  by mouth daily (with breakfast). metoprolol succinate (TOPROL-XL) 25 mg XL tablet Take 1 Tab by mouth daily. Qty: 30 Tab, Refills: 11      famotidine (PEPCID) 40 mg tablet Take 1 Tab by mouth two (2) times a day. Qty: 60 Tab, Refills: 1      ticagrelor (BRILINTA) 90 mg tablet Take 1 Tab by mouth every twelve (12) hours every twelve (12) hours. Qty: 60 Tab, Refills: 11      omeprazole (PRILOSEC) 40 mg capsule Take 40 mg by mouth daily. lisinopril (PRINIVIL, ZESTRIL) 2.5 mg tablet Take 1 Tab by mouth daily. Qty: 30 Tab, Refills: 11      potassium 99 mg tablet Take 99 mg by mouth daily. MAGNESIUM PO Take  by mouth. tiotropium (SPIRIVA) 18 mcg inhalation capsule Take 1 Cap by inhalation daily. tamsulosin (FLOMAX) 0.4 mg capsule Take 0.4 mg by mouth daily. topiramate (TOPAMAX) 100 mg tablet Take 100 mg by mouth daily. venlafaxine-SR (EFFEXOR-XR) 75 mg capsule Take 75 mg by mouth nightly. atorvastatin (LIPITOR) 40 mg tablet Take  by mouth daily. levothyroxine (SYNTHROID) 25 mcg tablet Take  by mouth Daily (before breakfast). nitroglycerin (NITROSTAT) 0.4 mg SL tablet 1 Tab by SubLINGual route every five (5) minutes as needed for Chest Pain. Up to 3 doses.   Qty: 1 Bottle, Refills: 5         STOP taking these medications       vitamin a-vitamin c-vit e-min (OCUVITE) tablet Comments:   Reason for Stopping:         fish oil-omega-3 fatty acids 340-1,000 mg capsule Comments:   Reason for Stopping:         aspirin delayed-release 81 mg tablet Comments:   Reason for Stopping:         HYDROcodone-acetaminophen (NORCO) 7.5-325 mg per tablet Comments:   Reason for Stopping:                 Disposition: long term care facility  LTACH  Activity: Activity as tolerated and PT/OT Eval and Treat  Diet: DIET CARDIAC Regular    Follow-up Appointments   Procedures    FOLLOW UP VISIT Appointment in: Other ([address]) Follow up with provider at [facility] asap, notify infectious disease group dr Clara Mack calvin of patient location. Follow up with provider at [facility] infectious disease group dr Tracie Moncada et al of patient location. Standing Status:   Standing     Number of Occurrences:   1     Order Specific Question:   Appointment in     Answer: Other (Specify)         Follow-up Information     Follow up With Specialties Details Why [address] [place], [address] [place] [address]   [address]  [phone]            Time spent in patient discharge planning and coordination 41 minutes.

## 2018-11-16 NOTE — PROGRESS NOTES
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 Source of bacteremia unknown. · Treated [facility] BC+10/11/18, treated with 7 days IV/7 days PO · Blood cx : both sets with Amp S E. Faecalis; gent synergy R.  
· AMS: delirium vs other · COPD: on chronic prednisone. Decreased oxygen requirement. · Hx NSTEMI s/p PCI 10/2018 · Prior cholecystectomy Plan:  
 
· Continue  Ampicillin continuous infusion with Ceftriaxone 2g IV Q12h for synergy: anticipate prolonged course 6 weeks once establish start date. · Repeat BC today. Were ordered for 11/15 and not collected. Discussed with RN to insure we get repeat blood cx done as he is clinically improved. Follow results. Once blood cx negative, establishes start date and OK to place PICC line. · Mental status is improving steadily; suspect delirium due underlying infection. · WBC down today after blip up 11/15. Follow. · Fever resolved. Anti-infectives: · Ampicillin - · Ceftriaxone - · Vanc - Subjective:  
 
Alert and oriented x 3; ate almost all breakfast. Wife feels stool firming up as eats more. Interested in rehab if felt indicated Review of Systems:  A comprehensive review of systems was negative except for that written in the History of Present Illness. Objective:  
 
Visit Vitals /67 Pulse 67 Temp 97.7 °F (36.5 °C) Resp 18 Wt 87.3 kg (192 lb 6.4 oz) SpO2 99% BMI 29.25 kg/m² Temp (24hrs), Av.7 °F (36.5 °C), Min:97.3 °F (36.3 °C), Max:98.2 °F (36.8 °C) Lines:  none Physical Exam:  
General:  Alert, cooperative, well noursished, well developed, appears stated age Eyes:  Sclera anicteric. Pupils equally round and reactive to light, splinter hemorrhages on left conjunctiva Mouth/Throat: Mucous membranes normal, oral pharynx clear Neck: Supple, symmetric Lungs:   Clear to auscultation bilaterally, good effort CV:  Regular rate and rhythm,no murmur, rub or gallop, crisp click of mechanical valve. Abdomen:   Soft, non-tender. bowel sounds normal. non-distended Extremities: No cyanosis or edema Skin: Scattered ecchymosis and bruising; arm edema bilaterally about same. Lymph nodes: Cervical and supraclavicular normal  
Musculoskeletal: No swelling or deformity, no nail hemorrhages Lines/Devices:  No lines Psych: Alert and oriented, normal mood affect given the setting; much calmer Data Review: CBC:  
Recent Labs 11/16/18 
0356 11/15/18 
0504 11/14/18 
0410 WBC 13.2* 14.1* 11.8*  
RBC 2.96* 3.22* 3.08* HGB 9.3* 10.0* 9.3* HCT 27.5* 30.2* 29.2*  
 286 254 GRANS 75 77 74 LYMPH 13 11* 13 EOS 1 1 2 CMP:  
Recent Labs 11/16/18 0356 11/15/18 
0504 11/14/18 
0410 GLU 95 112* 75  140 140  
K 3.4* 3.5 3.2*  
* 111* 114* CO2 19* 18* 17* BUN 7* 9 12 CREA 0.85 0.97 0.84  0.82 CA 7.5* 7.4* 7.4* AGAP 8 11 9 AP  --   --  147* TP  --   --  5.4* ALB  --   --  2.1*  
GLOB  --   --  3.3 AGRAT  --   --  0.6* Liver Enzymes:  
Recent Labs 11/14/18 
0410 TP 5.4* ALB 2.1* * SGOT 40* Inflammation studies: No results found for: SR, ESRA, CRP Microbiology:  
 
 
Imaging:  
 
 
Signed By: Freddy Dutta MD   
 November 16, 2018

## 2018-11-17 LAB
ALBUMIN SERPL-MCNC: 2.2 G/DL (ref 3.2–4.6)
ALBUMIN/GLOB SERPL: 0.6 {RATIO} (ref 1.2–3.5)
ALP SERPL-CCNC: 152 U/L (ref 50–136)
ALT SERPL-CCNC: 51 U/L (ref 12–65)
ANION GAP SERPL CALC-SCNC: 8 MMOL/L (ref 7–16)
AST SERPL-CCNC: 36 U/L (ref 15–37)
BILIRUB SERPL-MCNC: 0.3 MG/DL (ref 0.2–1.1)
BNP SERPL-MCNC: 770 PG/ML
BUN SERPL-MCNC: 9 MG/DL (ref 8–23)
CALCIUM SERPL-MCNC: 7.5 MG/DL (ref 8.3–10.4)
CHLORIDE SERPL-SCNC: 115 MMOL/L (ref 98–107)
CO2 SERPL-SCNC: 20 MMOL/L (ref 21–32)
CREAT SERPL-MCNC: 0.98 MG/DL (ref 0.8–1.5)
CRP SERPL-MCNC: 1.9 MG/DL (ref 0–0.9)
ERYTHROCYTE [DISTWIDTH] IN BLOOD BY AUTOMATED COUNT: 15.2 %
GLOBULIN SER CALC-MCNC: 3.4 G/DL (ref 2.3–3.5)
GLUCOSE SERPL-MCNC: 92 MG/DL (ref 65–100)
HCT VFR BLD AUTO: 28.5 % (ref 41.1–50.3)
HGB BLD-MCNC: 9.5 G/DL (ref 13.6–17.2)
MAGNESIUM SERPL-MCNC: 2 MG/DL (ref 1.8–2.4)
MCH RBC QN AUTO: 31.5 PG (ref 26.1–32.9)
MCHC RBC AUTO-ENTMCNC: 33.3 G/DL (ref 31.4–35)
MCV RBC AUTO: 94.4 FL (ref 79.6–97.8)
NRBC # BLD: 0 K/UL (ref 0–0.2)
PHOSPHATE SERPL-MCNC: 3.1 MG/DL (ref 2.3–3.7)
PLATELET # BLD AUTO: 318 K/UL (ref 150–450)
PMV BLD AUTO: 9.5 FL (ref 9.4–12.3)
POTASSIUM SERPL-SCNC: 3.4 MMOL/L (ref 3.5–5.1)
PROT SERPL-MCNC: 5.6 G/DL (ref 6.3–8.2)
QUANTIFERON CRITERIA, QFI1T: NORMAL
QUANTIFERON INCUBATION, QF1T: ABNORMAL
QUANTIFERON MITOGEN VALUE: >10 IU/ML
QUANTIFERON NIL VALUE: 0.1 IU/ML
QUANTIFERON PLUS, QF2T: POSITIVE
QUANTIFERON TB1 AG: 1.22 IU/ML
QUANTIFERON TB2 AG: 1.08 IU/ML
RBC # BLD AUTO: 3.02 M/UL (ref 4.23–5.6)
SODIUM SERPL-SCNC: 143 MMOL/L (ref 136–145)
TSH SERPL DL<=0.005 MIU/L-ACNC: 1.62 UIU/ML (ref 0.36–3.74)
WBC # BLD AUTO: 14.4 K/UL (ref 4.3–11.1)

## 2018-11-17 PROCEDURE — 80053 COMPREHEN METABOLIC PANEL: CPT

## 2018-11-17 PROCEDURE — 83880 ASSAY OF NATRIURETIC PEPTIDE: CPT

## 2018-11-17 PROCEDURE — 84443 ASSAY THYROID STIM HORMONE: CPT

## 2018-11-17 PROCEDURE — 86140 C-REACTIVE PROTEIN: CPT

## 2018-11-17 PROCEDURE — 83735 ASSAY OF MAGNESIUM: CPT

## 2018-11-17 PROCEDURE — 84100 ASSAY OF PHOSPHORUS: CPT

## 2018-11-17 PROCEDURE — 85027 COMPLETE CBC AUTOMATED: CPT

## 2018-11-18 LAB
CA-I BLD-MCNC: 4.72 MG/DL (ref 4–5.2)
INR PPP: 3.1
PROTHROMBIN TIME: 30.7 SEC (ref 11.5–14.5)

## 2018-11-18 PROCEDURE — 82330 ASSAY OF CALCIUM: CPT

## 2018-11-18 PROCEDURE — 85610 PROTHROMBIN TIME: CPT

## 2018-11-19 LAB
ALBUMIN SERPL-MCNC: 2.4 G/DL (ref 3.2–4.6)
ALBUMIN/GLOB SERPL: 0.7 {RATIO} (ref 1.2–3.5)
ALP SERPL-CCNC: 152 U/L (ref 50–136)
ALT SERPL-CCNC: 41 U/L (ref 12–65)
ANION GAP SERPL CALC-SCNC: 11 MMOL/L (ref 7–16)
AST SERPL-CCNC: 24 U/L (ref 15–37)
BILIRUB SERPL-MCNC: 0.5 MG/DL (ref 0.2–1.1)
BUN SERPL-MCNC: 9 MG/DL (ref 8–23)
CALCIUM SERPL-MCNC: 8 MG/DL (ref 8.3–10.4)
CHLORIDE SERPL-SCNC: 113 MMOL/L (ref 98–107)
CO2 SERPL-SCNC: 19 MMOL/L (ref 21–32)
CREAT SERPL-MCNC: 0.83 MG/DL (ref 0.8–1.5)
ERYTHROCYTE [DISTWIDTH] IN BLOOD BY AUTOMATED COUNT: 15.5 %
GLOBULIN SER CALC-MCNC: 3.3 G/DL (ref 2.3–3.5)
GLUCOSE SERPL-MCNC: 65 MG/DL (ref 65–100)
HCT VFR BLD AUTO: 30.5 % (ref 41.1–50.3)
HGB BLD-MCNC: 9.7 G/DL (ref 13.6–17.2)
MCH RBC QN AUTO: 30.6 PG (ref 26.1–32.9)
MCHC RBC AUTO-ENTMCNC: 31.8 G/DL (ref 31.4–35)
MCV RBC AUTO: 96.2 FL (ref 79.6–97.8)
NRBC # BLD: 0 K/UL (ref 0–0.2)
PLATELET # BLD AUTO: 336 K/UL (ref 150–450)
PMV BLD AUTO: 9.5 FL (ref 9.4–12.3)
POTASSIUM SERPL-SCNC: 3.2 MMOL/L (ref 3.5–5.1)
PROT SERPL-MCNC: 5.7 G/DL (ref 6.3–8.2)
RBC # BLD AUTO: 3.17 M/UL (ref 4.23–5.6)
SODIUM SERPL-SCNC: 143 MMOL/L (ref 136–145)
WBC # BLD AUTO: 15.2 K/UL (ref 4.3–11.1)

## 2018-11-19 PROCEDURE — 85027 COMPLETE CBC AUTOMATED: CPT

## 2018-11-19 PROCEDURE — 80053 COMPREHEN METABOLIC PANEL: CPT

## 2018-11-20 LAB
ANION GAP SERPL CALC-SCNC: 8 MMOL/L (ref 7–16)
BUN SERPL-MCNC: 11 MG/DL (ref 8–23)
CALCIUM SERPL-MCNC: 7.8 MG/DL (ref 8.3–10.4)
CHLORIDE SERPL-SCNC: 114 MMOL/L (ref 98–107)
CO2 SERPL-SCNC: 19 MMOL/L (ref 21–32)
CREAT SERPL-MCNC: 0.87 MG/DL (ref 0.8–1.5)
GLUCOSE SERPL-MCNC: 84 MG/DL (ref 65–100)
POTASSIUM SERPL-SCNC: 3.7 MMOL/L (ref 3.5–5.1)
SODIUM SERPL-SCNC: 141 MMOL/L (ref 136–145)

## 2018-11-20 PROCEDURE — 80048 BASIC METABOLIC PNL TOTAL CA: CPT

## 2018-11-21 LAB
ANION GAP SERPL CALC-SCNC: 9 MMOL/L (ref 7–16)
BACTERIA SPEC CULT: ABNORMAL
BACTERIA SPEC CULT: ABNORMAL
BACTERIA SPEC CULT: NORMAL
BASOPHILS # BLD: 0 K/UL (ref 0–0.2)
BASOPHILS NFR BLD: 0 % (ref 0–2)
BUN SERPL-MCNC: 12 MG/DL (ref 8–23)
CALCIUM SERPL-MCNC: 7.7 MG/DL (ref 8.3–10.4)
CHLORIDE SERPL-SCNC: 108 MMOL/L (ref 98–107)
CO2 SERPL-SCNC: 21 MMOL/L (ref 21–32)
CREAT SERPL-MCNC: 0.85 MG/DL (ref 0.8–1.5)
DIFFERENTIAL METHOD BLD: ABNORMAL
EOSINOPHIL # BLD: 0.1 K/UL (ref 0–0.8)
EOSINOPHIL NFR BLD: 1 % (ref 0.5–7.8)
ERYTHROCYTE [DISTWIDTH] IN BLOOD BY AUTOMATED COUNT: 15.8 %
GLUCOSE SERPL-MCNC: 105 MG/DL (ref 65–100)
GRAM STN SPEC: ABNORMAL
HCT VFR BLD AUTO: 32.8 % (ref 41.1–50.3)
HGB BLD-MCNC: 10.7 G/DL (ref 13.6–17.2)
IMM GRANULOCYTES # BLD: 0.3 K/UL (ref 0–0.5)
IMM GRANULOCYTES NFR BLD AUTO: 2 % (ref 0–5)
LYMPHOCYTES # BLD: 2 K/UL (ref 0.5–4.6)
LYMPHOCYTES NFR BLD: 13 % (ref 13–44)
MCH RBC QN AUTO: 30.8 PG (ref 26.1–32.9)
MCHC RBC AUTO-ENTMCNC: 32.6 G/DL (ref 31.4–35)
MCV RBC AUTO: 94.5 FL (ref 79.6–97.8)
MONOCYTES # BLD: 1.2 K/UL (ref 0.1–1.3)
MONOCYTES NFR BLD: 8 % (ref 4–12)
NEUTS SEG # BLD: 11.8 K/UL (ref 1.7–8.2)
NEUTS SEG NFR BLD: 76 % (ref 43–78)
NRBC # BLD: 0 K/UL (ref 0–0.2)
PLATELET # BLD AUTO: 314 K/UL (ref 150–450)
PMV BLD AUTO: 9.4 FL (ref 9.4–12.3)
POTASSIUM SERPL-SCNC: 3.6 MMOL/L (ref 3.5–5.1)
RBC # BLD AUTO: 3.47 M/UL (ref 4.23–5.6)
SERVICE CMNT-IMP: ABNORMAL
SERVICE CMNT-IMP: NORMAL
SODIUM SERPL-SCNC: 138 MMOL/L (ref 136–145)
WBC # BLD AUTO: 15.5 K/UL (ref 4.3–11.1)

## 2018-11-21 PROCEDURE — 80048 BASIC METABOLIC PNL TOTAL CA: CPT

## 2018-11-21 PROCEDURE — 87205 SMEAR GRAM STAIN: CPT

## 2018-11-21 PROCEDURE — 87040 BLOOD CULTURE FOR BACTERIA: CPT

## 2018-11-21 PROCEDURE — 85610 PROTHROMBIN TIME: CPT

## 2018-11-21 PROCEDURE — 87641 MR-STAPH DNA AMP PROBE: CPT

## 2018-11-21 PROCEDURE — 85025 COMPLETE CBC W/AUTO DIFF WBC: CPT

## 2018-11-21 PROCEDURE — 36415 COLL VENOUS BLD VENIPUNCTURE: CPT

## 2018-11-22 LAB
INR PPP: 2.2
PROTHROMBIN TIME: 23.5 SEC (ref 11.5–14.5)

## 2018-11-23 PROCEDURE — 85610 PROTHROMBIN TIME: CPT

## 2018-11-24 LAB
BACTERIA SPEC CULT: ABNORMAL
GRAM STN SPEC: ABNORMAL
INR PPP: 2
PROTHROMBIN TIME: 21.3 SEC (ref 11.5–14.5)
SERVICE CMNT-IMP: ABNORMAL

## 2018-11-26 LAB
ALBUMIN SERPL-MCNC: 2.6 G/DL (ref 3.2–4.6)
ALBUMIN/GLOB SERPL: 0.7 {RATIO} (ref 1.2–3.5)
ALP SERPL-CCNC: 171 U/L (ref 50–136)
ALT SERPL-CCNC: 52 U/L (ref 12–65)
ANION GAP SERPL CALC-SCNC: 9 MMOL/L (ref 7–16)
AST SERPL-CCNC: 34 U/L (ref 15–37)
BACTERIA SPEC CULT: NORMAL
BILIRUB SERPL-MCNC: 0.4 MG/DL (ref 0.2–1.1)
BUN SERPL-MCNC: 14 MG/DL (ref 8–23)
CALCIUM SERPL-MCNC: 8.3 MG/DL (ref 8.3–10.4)
CHLORIDE SERPL-SCNC: 110 MMOL/L (ref 98–107)
CO2 SERPL-SCNC: 19 MMOL/L (ref 21–32)
CREAT SERPL-MCNC: 0.84 MG/DL (ref 0.8–1.5)
ERYTHROCYTE [DISTWIDTH] IN BLOOD BY AUTOMATED COUNT: 16.1 %
GLOBULIN SER CALC-MCNC: 3.8 G/DL (ref 2.3–3.5)
GLUCOSE SERPL-MCNC: 92 MG/DL (ref 65–100)
HCT VFR BLD AUTO: 33.8 % (ref 41.1–50.3)
HGB BLD-MCNC: 10.9 G/DL (ref 13.6–17.2)
INR PPP: 1.9
MCH RBC QN AUTO: 31.1 PG (ref 26.1–32.9)
MCHC RBC AUTO-ENTMCNC: 32.2 G/DL (ref 31.4–35)
MCV RBC AUTO: 96.6 FL (ref 79.6–97.8)
NRBC # BLD: 0 K/UL (ref 0–0.2)
PLATELET # BLD AUTO: 280 K/UL (ref 150–450)
PMV BLD AUTO: 9.9 FL (ref 9.4–12.3)
POTASSIUM SERPL-SCNC: 3.7 MMOL/L (ref 3.5–5.1)
PROT SERPL-MCNC: 6.4 G/DL (ref 6.3–8.2)
PROTHROMBIN TIME: 20.6 SEC (ref 11.5–14.5)
RBC # BLD AUTO: 3.5 M/UL (ref 4.23–5.6)
SERVICE CMNT-IMP: NORMAL
SODIUM SERPL-SCNC: 138 MMOL/L (ref 136–145)
WBC # BLD AUTO: 15.8 K/UL (ref 4.3–11.1)

## 2018-11-26 PROCEDURE — 80053 COMPREHEN METABOLIC PANEL: CPT

## 2018-11-26 PROCEDURE — 85610 PROTHROMBIN TIME: CPT

## 2018-11-26 PROCEDURE — 36415 COLL VENOUS BLD VENIPUNCTURE: CPT

## 2018-11-26 PROCEDURE — 85027 COMPLETE CBC AUTOMATED: CPT

## 2018-11-28 LAB
INR PPP: 2
PROTHROMBIN TIME: 21.9 SEC (ref 11.5–14.5)

## 2018-11-28 PROCEDURE — 85610 PROTHROMBIN TIME: CPT

## 2018-11-30 LAB
INR PPP: 2
PROTHROMBIN TIME: 21.8 SEC (ref 11.5–14.5)

## 2018-11-30 PROCEDURE — 85610 PROTHROMBIN TIME: CPT

## 2018-12-03 LAB
ALBUMIN SERPL-MCNC: 2.8 G/DL (ref 3.2–4.6)
ALBUMIN/GLOB SERPL: 0.8 {RATIO} (ref 1.2–3.5)
ALP SERPL-CCNC: 163 U/L (ref 50–136)
ALT SERPL-CCNC: 42 U/L (ref 12–65)
ANION GAP SERPL CALC-SCNC: 9 MMOL/L (ref 7–16)
AST SERPL-CCNC: 25 U/L (ref 15–37)
BILIRUB SERPL-MCNC: 0.5 MG/DL (ref 0.2–1.1)
BUN SERPL-MCNC: 15 MG/DL (ref 8–23)
CALCIUM SERPL-MCNC: 8.6 MG/DL (ref 8.3–10.4)
CHLORIDE SERPL-SCNC: 112 MMOL/L (ref 98–107)
CO2 SERPL-SCNC: 19 MMOL/L (ref 21–32)
CREAT SERPL-MCNC: 0.78 MG/DL (ref 0.8–1.5)
ERYTHROCYTE [DISTWIDTH] IN BLOOD BY AUTOMATED COUNT: 15.6 % (ref 11.9–14.6)
GLOBULIN SER CALC-MCNC: 3.7 G/DL (ref 2.3–3.5)
GLUCOSE SERPL-MCNC: 95 MG/DL (ref 65–100)
HCT VFR BLD AUTO: 36.8 % (ref 41.1–50.3)
HGB BLD-MCNC: 12 G/DL (ref 13.6–17.2)
INR PPP: 2.3
MCH RBC QN AUTO: 30.8 PG (ref 26.1–32.9)
MCHC RBC AUTO-ENTMCNC: 32.6 G/DL (ref 31.4–35)
MCV RBC AUTO: 94.6 FL (ref 79.6–97.8)
NRBC # BLD: 0 K/UL (ref 0–0.2)
PLATELET # BLD AUTO: 275 K/UL (ref 150–450)
PMV BLD AUTO: 9.8 FL (ref 9.4–12.3)
POTASSIUM SERPL-SCNC: 3.8 MMOL/L (ref 3.5–5.1)
PROT SERPL-MCNC: 6.5 G/DL (ref 6.3–8.2)
PROTHROMBIN TIME: 24.5 SEC (ref 11.5–14.5)
RBC # BLD AUTO: 3.89 M/UL (ref 4.23–5.6)
SODIUM SERPL-SCNC: 140 MMOL/L (ref 136–145)
WBC # BLD AUTO: 14.8 K/UL (ref 4.3–11.1)

## 2018-12-03 PROCEDURE — 80053 COMPREHEN METABOLIC PANEL: CPT

## 2018-12-03 PROCEDURE — 85610 PROTHROMBIN TIME: CPT

## 2018-12-03 PROCEDURE — 85027 COMPLETE CBC AUTOMATED: CPT

## 2018-12-05 LAB
INR PPP: 2.4
PROTHROMBIN TIME: 25.7 SEC (ref 11.7–14.5)

## 2018-12-05 PROCEDURE — 85610 PROTHROMBIN TIME: CPT

## 2018-12-06 LAB
APPEARANCE UR: CLEAR
BILIRUB UR QL: NEGATIVE
COLOR UR: YELLOW
GLUCOSE UR STRIP.AUTO-MCNC: NEGATIVE MG/DL
HGB UR QL STRIP: NEGATIVE
KETONES UR QL STRIP.AUTO: NEGATIVE MG/DL
LEUKOCYTE ESTERASE UR QL STRIP.AUTO: NEGATIVE
NITRITE UR QL STRIP.AUTO: NEGATIVE
PH UR STRIP: 8 [PH] (ref 5–9)
PROT UR STRIP-MCNC: NEGATIVE MG/DL
SP GR UR REFRACTOMETRY: 1.02 (ref 1–1.02)
UROBILINOGEN UR QL STRIP.AUTO: 0.2 EU/DL (ref 0.2–1)

## 2018-12-06 PROCEDURE — 81003 URINALYSIS AUTO W/O SCOPE: CPT

## 2018-12-06 PROCEDURE — 87106 FUNGI IDENTIFICATION YEAST: CPT

## 2018-12-06 PROCEDURE — 87086 URINE CULTURE/COLONY COUNT: CPT

## 2018-12-07 LAB
ANION GAP SERPL CALC-SCNC: 9 MMOL/L (ref 7–16)
BASOPHILS # BLD: 0.1 K/UL (ref 0–0.2)
BASOPHILS NFR BLD: 0 % (ref 0–2)
BUN SERPL-MCNC: 16 MG/DL (ref 8–23)
CALCIUM SERPL-MCNC: 8.9 MG/DL (ref 8.3–10.4)
CHLORIDE SERPL-SCNC: 110 MMOL/L (ref 98–107)
CO2 SERPL-SCNC: 20 MMOL/L (ref 21–32)
CREAT SERPL-MCNC: 0.93 MG/DL (ref 0.8–1.5)
DIFFERENTIAL METHOD BLD: ABNORMAL
EOSINOPHIL # BLD: 0.2 K/UL (ref 0–0.8)
EOSINOPHIL NFR BLD: 2 % (ref 0.5–7.8)
ERYTHROCYTE [DISTWIDTH] IN BLOOD BY AUTOMATED COUNT: 15.3 % (ref 11.9–14.6)
GLUCOSE SERPL-MCNC: 113 MG/DL (ref 65–100)
HCT VFR BLD AUTO: 38.4 % (ref 41.1–50.3)
HGB BLD-MCNC: 12.3 G/DL (ref 13.6–17.2)
IMM GRANULOCYTES # BLD: 0.3 K/UL (ref 0–0.5)
IMM GRANULOCYTES NFR BLD AUTO: 2 % (ref 0–5)
INR PPP: 2.3
LYMPHOCYTES # BLD: 2.4 K/UL (ref 0.5–4.6)
LYMPHOCYTES NFR BLD: 17 % (ref 13–44)
MCH RBC QN AUTO: 30.8 PG (ref 26.1–32.9)
MCHC RBC AUTO-ENTMCNC: 32 G/DL (ref 31.4–35)
MCV RBC AUTO: 96 FL (ref 79.6–97.8)
MONOCYTES # BLD: 1.2 K/UL (ref 0.1–1.3)
MONOCYTES NFR BLD: 9 % (ref 4–12)
NEUTS SEG # BLD: 9.6 K/UL (ref 1.7–8.2)
NEUTS SEG NFR BLD: 70 % (ref 43–78)
NRBC # BLD: 0 K/UL (ref 0–0.2)
PLATELET # BLD AUTO: 280 K/UL (ref 150–450)
PMV BLD AUTO: 9.5 FL (ref 9.4–12.3)
POTASSIUM SERPL-SCNC: 3.9 MMOL/L (ref 3.5–5.1)
PROTHROMBIN TIME: 24.7 SEC (ref 11.7–14.5)
RBC # BLD AUTO: 4 M/UL (ref 4.23–5.6)
SODIUM SERPL-SCNC: 139 MMOL/L (ref 136–145)
WBC # BLD AUTO: 13.8 K/UL (ref 4.3–11.1)

## 2018-12-07 PROCEDURE — 85610 PROTHROMBIN TIME: CPT

## 2018-12-07 PROCEDURE — 80048 BASIC METABOLIC PNL TOTAL CA: CPT

## 2018-12-07 PROCEDURE — 85025 COMPLETE CBC W/AUTO DIFF WBC: CPT

## 2018-12-08 ENCOUNTER — APPOINTMENT (OUTPATIENT)
Dept: GENERAL RADIOLOGY | Age: 73
End: 2018-12-08
Attending: INTERNAL MEDICINE

## 2018-12-08 PROCEDURE — 71045 X-RAY EXAM CHEST 1 VIEW: CPT

## 2018-12-09 LAB
INR PPP: 3
PROTHROMBIN TIME: 30.7 SEC (ref 11.7–14.5)

## 2018-12-09 PROCEDURE — 85610 PROTHROMBIN TIME: CPT

## 2018-12-10 LAB
ALBUMIN SERPL-MCNC: 2.9 G/DL (ref 3.2–4.6)
ALBUMIN/GLOB SERPL: 0.8 {RATIO} (ref 1.2–3.5)
ALP SERPL-CCNC: 164 U/L (ref 50–136)
ALT SERPL-CCNC: 41 U/L (ref 12–65)
ANION GAP SERPL CALC-SCNC: 10 MMOL/L (ref 7–16)
AST SERPL-CCNC: 27 U/L (ref 15–37)
BASOPHILS # BLD: 0.1 K/UL (ref 0–0.2)
BASOPHILS NFR BLD: 0 % (ref 0–2)
BILIRUB DIRECT SERPL-MCNC: <0.1 MG/DL
BILIRUB SERPL-MCNC: 0.3 MG/DL (ref 0.2–1.1)
BUN SERPL-MCNC: 14 MG/DL (ref 8–23)
CALCIUM SERPL-MCNC: 8.8 MG/DL (ref 8.3–10.4)
CHLORIDE SERPL-SCNC: 111 MMOL/L (ref 98–107)
CO2 SERPL-SCNC: 21 MMOL/L (ref 21–32)
CREAT SERPL-MCNC: 1.06 MG/DL (ref 0.8–1.5)
DIFFERENTIAL METHOD BLD: ABNORMAL
EOSINOPHIL # BLD: 0.2 K/UL (ref 0–0.8)
EOSINOPHIL NFR BLD: 1 % (ref 0.5–7.8)
ERYTHROCYTE [DISTWIDTH] IN BLOOD BY AUTOMATED COUNT: 15 % (ref 11.9–14.6)
GLOBULIN SER CALC-MCNC: 3.7 G/DL (ref 2.3–3.5)
GLUCOSE SERPL-MCNC: 88 MG/DL (ref 65–100)
HCT VFR BLD AUTO: 37.5 % (ref 41.1–50.3)
HGB BLD-MCNC: 12.1 G/DL (ref 13.6–17.2)
IMM GRANULOCYTES # BLD: 0.3 K/UL (ref 0–0.5)
IMM GRANULOCYTES NFR BLD AUTO: 2 % (ref 0–5)
INR PPP: 3.1
LYMPHOCYTES # BLD: 2.4 K/UL (ref 0.5–4.6)
LYMPHOCYTES NFR BLD: 17 % (ref 13–44)
MCH RBC QN AUTO: 31 PG (ref 26.1–32.9)
MCHC RBC AUTO-ENTMCNC: 32.3 G/DL (ref 31.4–35)
MCV RBC AUTO: 96.2 FL (ref 79.6–97.8)
MONOCYTES # BLD: 1.4 K/UL (ref 0.1–1.3)
MONOCYTES NFR BLD: 10 % (ref 4–12)
NEUTS SEG # BLD: 10 K/UL (ref 1.7–8.2)
NEUTS SEG NFR BLD: 70 % (ref 43–78)
NRBC # BLD: 0 K/UL (ref 0–0.2)
PLATELET # BLD AUTO: 288 K/UL (ref 150–450)
PMV BLD AUTO: 9.7 FL (ref 9.4–12.3)
POTASSIUM SERPL-SCNC: 4.3 MMOL/L (ref 3.5–5.1)
PROT SERPL-MCNC: 6.6 G/DL (ref 6.3–8.2)
PROTHROMBIN TIME: 31.8 SEC (ref 11.7–14.5)
RBC # BLD AUTO: 3.9 M/UL (ref 4.23–5.6)
SODIUM SERPL-SCNC: 142 MMOL/L (ref 136–145)
WBC # BLD AUTO: 14.3 K/UL (ref 4.3–11.1)

## 2018-12-10 PROCEDURE — 80053 COMPREHEN METABOLIC PANEL: CPT

## 2018-12-10 PROCEDURE — 85025 COMPLETE CBC W/AUTO DIFF WBC: CPT

## 2018-12-10 PROCEDURE — 36415 COLL VENOUS BLD VENIPUNCTURE: CPT

## 2018-12-10 PROCEDURE — 85610 PROTHROMBIN TIME: CPT

## 2018-12-10 PROCEDURE — 82248 BILIRUBIN DIRECT: CPT

## 2018-12-11 LAB
BACTERIA SPEC CULT: ABNORMAL
SERVICE CMNT-IMP: ABNORMAL

## 2018-12-12 LAB
INR PPP: 3.4
PROTHROMBIN TIME: 33.5 SEC (ref 11.7–14.5)

## 2018-12-12 PROCEDURE — 85610 PROTHROMBIN TIME: CPT

## 2018-12-12 PROCEDURE — 36415 COLL VENOUS BLD VENIPUNCTURE: CPT

## 2018-12-14 LAB
ANION GAP SERPL CALC-SCNC: 7 MMOL/L (ref 7–16)
BASOPHILS # BLD: 0.1 K/UL (ref 0–0.2)
BASOPHILS NFR BLD: 1 % (ref 0–2)
BUN SERPL-MCNC: 11 MG/DL (ref 8–23)
CALCIUM SERPL-MCNC: 9 MG/DL (ref 8.3–10.4)
CHLORIDE SERPL-SCNC: 112 MMOL/L (ref 98–107)
CO2 SERPL-SCNC: 23 MMOL/L (ref 21–32)
CREAT SERPL-MCNC: 0.97 MG/DL (ref 0.8–1.5)
DIFFERENTIAL METHOD BLD: ABNORMAL
EOSINOPHIL # BLD: 0.1 K/UL (ref 0–0.8)
EOSINOPHIL NFR BLD: 1 % (ref 0.5–7.8)
ERYTHROCYTE [DISTWIDTH] IN BLOOD BY AUTOMATED COUNT: 14.8 % (ref 11.9–14.6)
GLUCOSE SERPL-MCNC: 89 MG/DL (ref 65–100)
HCT VFR BLD AUTO: 35.5 % (ref 41.1–50.3)
HGB BLD-MCNC: 11.5 G/DL (ref 13.6–17.2)
IMM GRANULOCYTES # BLD: 0.3 K/UL (ref 0–0.5)
IMM GRANULOCYTES NFR BLD AUTO: 2 % (ref 0–5)
INR PPP: 3.5
LYMPHOCYTES # BLD: 2.3 K/UL (ref 0.5–4.6)
LYMPHOCYTES NFR BLD: 18 % (ref 13–44)
MCH RBC QN AUTO: 30.8 PG (ref 26.1–32.9)
MCHC RBC AUTO-ENTMCNC: 32.4 G/DL (ref 31.4–35)
MCV RBC AUTO: 95.2 FL (ref 79.6–97.8)
MONOCYTES # BLD: 1.2 K/UL (ref 0.1–1.3)
MONOCYTES NFR BLD: 9 % (ref 4–12)
NEUTS SEG # BLD: 9 K/UL (ref 1.7–8.2)
NEUTS SEG NFR BLD: 70 % (ref 43–78)
NRBC # BLD: 0 K/UL (ref 0–0.2)
PLATELET # BLD AUTO: 262 K/UL (ref 150–450)
PMV BLD AUTO: 9.7 FL (ref 9.4–12.3)
POTASSIUM SERPL-SCNC: 4.2 MMOL/L (ref 3.5–5.1)
PROTHROMBIN TIME: 36.1 SEC (ref 11.7–14.5)
RBC # BLD AUTO: 3.73 M/UL (ref 4.23–5.6)
SODIUM SERPL-SCNC: 142 MMOL/L (ref 136–145)
WBC # BLD AUTO: 12.8 K/UL (ref 4.3–11.1)

## 2018-12-14 PROCEDURE — 85025 COMPLETE CBC W/AUTO DIFF WBC: CPT

## 2018-12-14 PROCEDURE — 85610 PROTHROMBIN TIME: CPT

## 2018-12-14 PROCEDURE — 36415 COLL VENOUS BLD VENIPUNCTURE: CPT

## 2018-12-14 PROCEDURE — 80048 BASIC METABOLIC PNL TOTAL CA: CPT

## 2018-12-17 LAB
ALBUMIN SERPL-MCNC: 2.7 G/DL (ref 3.2–4.6)
ALBUMIN/GLOB SERPL: 0.8 {RATIO} (ref 1.2–3.5)
ALP SERPL-CCNC: 154 U/L (ref 50–136)
ALT SERPL-CCNC: 31 U/L (ref 12–65)
ANION GAP SERPL CALC-SCNC: 9 MMOL/L (ref 7–16)
AST SERPL-CCNC: 20 U/L (ref 15–37)
BILIRUB SERPL-MCNC: 0.3 MG/DL (ref 0.2–1.1)
BUN SERPL-MCNC: 14 MG/DL (ref 8–23)
CALCIUM SERPL-MCNC: 8.3 MG/DL (ref 8.3–10.4)
CHLORIDE SERPL-SCNC: 113 MMOL/L (ref 98–107)
CO2 SERPL-SCNC: 20 MMOL/L (ref 21–32)
CREAT SERPL-MCNC: 0.92 MG/DL (ref 0.8–1.5)
ERYTHROCYTE [DISTWIDTH] IN BLOOD BY AUTOMATED COUNT: 14.9 % (ref 11.9–14.6)
GLOBULIN SER CALC-MCNC: 3.4 G/DL (ref 2.3–3.5)
GLUCOSE SERPL-MCNC: 92 MG/DL (ref 65–100)
HCT VFR BLD AUTO: 35.2 % (ref 41.1–50.3)
HGB BLD-MCNC: 11.4 G/DL (ref 13.6–17.2)
INR PPP: 3.7
MCH RBC QN AUTO: 30.7 PG (ref 26.1–32.9)
MCHC RBC AUTO-ENTMCNC: 32.4 G/DL (ref 31.4–35)
MCV RBC AUTO: 94.9 FL (ref 79.6–97.8)
NRBC # BLD: 0 K/UL (ref 0–0.2)
PLATELET # BLD AUTO: 256 K/UL (ref 150–450)
PMV BLD AUTO: 10 FL (ref 9.4–12.3)
POTASSIUM SERPL-SCNC: 3.4 MMOL/L (ref 3.5–5.1)
PROT SERPL-MCNC: 6.1 G/DL (ref 6.3–8.2)
PROTHROMBIN TIME: 35.8 SEC (ref 11.7–14.5)
RBC # BLD AUTO: 3.71 M/UL (ref 4.23–5.6)
SODIUM SERPL-SCNC: 142 MMOL/L (ref 136–145)
WBC # BLD AUTO: 12 K/UL (ref 4.3–11.1)

## 2018-12-17 PROCEDURE — 36415 COLL VENOUS BLD VENIPUNCTURE: CPT

## 2018-12-17 PROCEDURE — 85027 COMPLETE CBC AUTOMATED: CPT

## 2018-12-17 PROCEDURE — 80053 COMPREHEN METABOLIC PANEL: CPT

## 2018-12-17 PROCEDURE — 85610 PROTHROMBIN TIME: CPT

## 2018-12-19 LAB
INR PPP: 2.7
PROTHROMBIN TIME: 27.9 SEC (ref 11.7–14.5)

## 2018-12-19 PROCEDURE — 36415 COLL VENOUS BLD VENIPUNCTURE: CPT

## 2018-12-19 PROCEDURE — 85610 PROTHROMBIN TIME: CPT

## 2018-12-21 LAB
INR PPP: 2.4
PROTHROMBIN TIME: 25.9 SEC (ref 11.7–14.5)

## 2018-12-21 PROCEDURE — 36415 COLL VENOUS BLD VENIPUNCTURE: CPT

## 2018-12-21 PROCEDURE — 85610 PROTHROMBIN TIME: CPT

## 2018-12-24 LAB
ALBUMIN SERPL-MCNC: 2.9 G/DL (ref 3.2–4.6)
ALBUMIN/GLOB SERPL: 0.9 {RATIO} (ref 1.2–3.5)
ALP SERPL-CCNC: 149 U/L (ref 50–136)
ALT SERPL-CCNC: 30 U/L (ref 12–65)
ANION GAP SERPL CALC-SCNC: 8 MMOL/L (ref 7–16)
AST SERPL-CCNC: 29 U/L (ref 15–37)
BILIRUB SERPL-MCNC: 0.4 MG/DL (ref 0.2–1.1)
BUN SERPL-MCNC: 13 MG/DL (ref 8–23)
CALCIUM SERPL-MCNC: 8.1 MG/DL (ref 8.3–10.4)
CHLORIDE SERPL-SCNC: 112 MMOL/L (ref 98–107)
CO2 SERPL-SCNC: 22 MMOL/L (ref 21–32)
CREAT SERPL-MCNC: 1.09 MG/DL (ref 0.8–1.5)
ERYTHROCYTE [DISTWIDTH] IN BLOOD BY AUTOMATED COUNT: 14.9 % (ref 11.9–14.6)
GLOBULIN SER CALC-MCNC: 3.1 G/DL (ref 2.3–3.5)
GLUCOSE SERPL-MCNC: 90 MG/DL (ref 65–100)
HCT VFR BLD AUTO: 35.5 % (ref 41.1–50.3)
HGB BLD-MCNC: 11.4 G/DL (ref 13.6–17.2)
INR PPP: 2.8
MCH RBC QN AUTO: 31 PG (ref 26.1–32.9)
MCHC RBC AUTO-ENTMCNC: 32.1 G/DL (ref 31.4–35)
MCV RBC AUTO: 96.5 FL (ref 79.6–97.8)
NRBC # BLD: 0 K/UL (ref 0–0.2)
PLATELET # BLD AUTO: 254 K/UL (ref 150–450)
PMV BLD AUTO: 9.9 FL (ref 9.4–12.3)
POTASSIUM SERPL-SCNC: 3.6 MMOL/L (ref 3.5–5.1)
PROT SERPL-MCNC: 6 G/DL (ref 6.3–8.2)
PROTHROMBIN TIME: 29.1 SEC (ref 11.7–14.5)
RBC # BLD AUTO: 3.68 M/UL (ref 4.23–5.6)
SODIUM SERPL-SCNC: 142 MMOL/L (ref 136–145)
WBC # BLD AUTO: 10.9 K/UL (ref 4.3–11.1)

## 2018-12-24 PROCEDURE — 85027 COMPLETE CBC AUTOMATED: CPT

## 2018-12-24 PROCEDURE — 85610 PROTHROMBIN TIME: CPT

## 2018-12-24 PROCEDURE — 36415 COLL VENOUS BLD VENIPUNCTURE: CPT

## 2018-12-24 PROCEDURE — 80053 COMPREHEN METABOLIC PANEL: CPT

## 2018-12-26 LAB
INR PPP: 2.6
PROTHROMBIN TIME: 27.6 SEC (ref 11.7–14.5)

## 2018-12-26 PROCEDURE — 36415 COLL VENOUS BLD VENIPUNCTURE: CPT

## 2018-12-26 PROCEDURE — 85610 PROTHROMBIN TIME: CPT

## 2018-12-28 LAB
INR PPP: 2.5
PROTHROMBIN TIME: 26.3 SEC (ref 11.7–14.5)

## 2018-12-28 PROCEDURE — 85610 PROTHROMBIN TIME: CPT

## 2018-12-28 PROCEDURE — 36415 COLL VENOUS BLD VENIPUNCTURE: CPT

## 2018-12-31 LAB
ALBUMIN SERPL-MCNC: 2.9 G/DL (ref 3.2–4.6)
ALBUMIN/GLOB SERPL: 0.9 {RATIO} (ref 1.2–3.5)
ALP SERPL-CCNC: 140 U/L (ref 50–136)
ALT SERPL-CCNC: 25 U/L (ref 12–65)
ANION GAP SERPL CALC-SCNC: 7 MMOL/L (ref 7–16)
AST SERPL-CCNC: 27 U/L (ref 15–37)
BILIRUB SERPL-MCNC: 0.4 MG/DL (ref 0.2–1.1)
BUN SERPL-MCNC: 18 MG/DL (ref 8–23)
CALCIUM SERPL-MCNC: 8.1 MG/DL (ref 8.3–10.4)
CHLORIDE SERPL-SCNC: 113 MMOL/L (ref 98–107)
CO2 SERPL-SCNC: 21 MMOL/L (ref 21–32)
CREAT SERPL-MCNC: 1.1 MG/DL (ref 0.8–1.5)
ERYTHROCYTE [DISTWIDTH] IN BLOOD BY AUTOMATED COUNT: 14.8 % (ref 11.9–14.6)
GLOBULIN SER CALC-MCNC: 3.1 G/DL (ref 2.3–3.5)
GLUCOSE SERPL-MCNC: 96 MG/DL (ref 65–100)
HCT VFR BLD AUTO: 34.9 % (ref 41.1–50.3)
HGB BLD-MCNC: 11.1 G/DL (ref 13.6–17.2)
INR PPP: 2.3
MCH RBC QN AUTO: 30.6 PG (ref 26.1–32.9)
MCHC RBC AUTO-ENTMCNC: 31.8 G/DL (ref 31.4–35)
MCV RBC AUTO: 96.1 FL (ref 79.6–97.8)
NRBC # BLD: 0 K/UL (ref 0–0.2)
PLATELET # BLD AUTO: 251 K/UL (ref 150–450)
PMV BLD AUTO: 10.3 FL (ref 9.4–12.3)
POTASSIUM SERPL-SCNC: 3.8 MMOL/L (ref 3.5–5.1)
PROT SERPL-MCNC: 6 G/DL (ref 6.3–8.2)
PROTHROMBIN TIME: 24.7 SEC (ref 11.7–14.5)
RBC # BLD AUTO: 3.63 M/UL (ref 4.23–5.6)
SODIUM SERPL-SCNC: 141 MMOL/L (ref 136–145)
WBC # BLD AUTO: 11.4 K/UL (ref 4.3–11.1)

## 2018-12-31 PROCEDURE — 80053 COMPREHEN METABOLIC PANEL: CPT

## 2018-12-31 PROCEDURE — 85027 COMPLETE CBC AUTOMATED: CPT

## 2018-12-31 PROCEDURE — 85610 PROTHROMBIN TIME: CPT

## 2018-12-31 PROCEDURE — 36415 COLL VENOUS BLD VENIPUNCTURE: CPT

## 2019-02-14 NOTE — CDMP QUERY
Please clarify if this patient is being treated/managed for: 
 
ACUTE METABOLIC  ENCEPHALOPATHY in the setting of a 68 yr male with confusion and leukocytosis being treated with IV vancomycin. =>Other Explanation of clinical findings =>Unable to Determine (no explanation of clinical findings) The medical record reflects the following: 
 
Risk Factors: AS with a Mechanical valve, 68 yr male,  possible endocarditis Clinical Indicators:  Confusion, per MD documentation \"probably bacteremic with enterococcus by oconee blood cultures\" Leukocytosis 18.5 on admission Treatment: CHRISTINE, IV vancomycin, Telemetry, Bed alarm Please clarify and document your clinical opinion in the progress notes and discharge summary including the definitive and/or presumptive diagnosis, (suspected or probable), related to the above clinical findings. Please include clinical findings supporting your diagnosis. Thanks, Raysa Latif RN Compliant Documentation Management Program 
[phone] no

## 2022-03-18 PROBLEM — Z79.01 LONG TERM (CURRENT) USE OF ANTICOAGULANTS: Status: ACTIVE | Noted: 2018-05-01

## 2022-03-18 PROBLEM — I33.0 INFECTIVE ENDOCARDITIS OF AORTIC VALVE: Status: ACTIVE | Noted: 2018-11-16

## 2022-03-18 PROBLEM — J44.9 COPD (CHRONIC OBSTRUCTIVE PULMONARY DISEASE) (HCC): Status: ACTIVE | Noted: 2018-09-19

## 2022-03-19 PROBLEM — D72.829 LEUKOCYTOSIS: Status: ACTIVE | Noted: 2018-10-22

## 2022-03-19 PROBLEM — R53.1 WEAKNESS: Status: ACTIVE | Noted: 2018-10-22

## 2022-03-19 PROBLEM — I38 ENDOCARDITIS: Status: ACTIVE | Noted: 2018-11-12

## 2022-03-19 PROBLEM — G89.29 CHRONIC PAIN: Status: ACTIVE | Noted: 2018-11-16

## 2022-03-19 PROBLEM — Z79.52 CURRENT CHRONIC USE OF SYSTEMIC STEROIDS: Status: ACTIVE | Noted: 2018-09-24

## 2022-03-19 PROBLEM — G93.41 ACUTE METABOLIC ENCEPHALOPATHY: Status: ACTIVE | Noted: 2018-11-16

## 2022-03-19 PROBLEM — Z79.01 ANTICOAGULATED ON COUMADIN: Status: ACTIVE | Noted: 2018-09-24

## 2022-03-19 PROBLEM — Z95.1 S/P CABG X 2: Status: ACTIVE | Noted: 2018-09-19

## 2022-03-19 PROBLEM — E03.9 ACQUIRED HYPOTHYROIDISM: Status: ACTIVE | Noted: 2018-09-23

## 2022-03-20 PROBLEM — I21.4 NSTEMI (NON-ST ELEVATED MYOCARDIAL INFARCTION) (HCC): Status: ACTIVE | Noted: 2018-09-19

## 2022-05-27 ENCOUNTER — ANTI-COAG VISIT (OUTPATIENT)
Dept: CARDIOLOGY CLINIC | Age: 77
End: 2022-05-27

## 2022-05-27 LAB — INR BLD: 2.6

## 2022-05-27 NOTE — PROGRESS NOTES
Anticoagulation Summary  As of 2022    INR goal:  2.5-3.5   TTR:  --   INR used for dosin.60 (2022)   Warfarin maintenance plan:  2 mg (2 mg x 1) every Mon, Wed, Fri; 3 mg (2 mg x 1.5) all other days   Weekly warfarin total:  18 mg   Plan last modified:  Moises Leger MA (2022)   Next INR check:  6/3/2022   Target end date:   Indefinite    Indications    Long term (current) use of anticoagulants [Z79.01]  S/P AVR (aortic valve replacement) [Z95.2]             Anticoagulation Episode Summary     INR check location:  Home Draw    Preferred lab:      Send INR reminders to:  [facility] CARDIOLOGY YANNA PT    Comments:        Anticoagulation Care Providers     Provider Role Specialty Phone number    Janay Rice MD [facility] [phone]

## 2022-06-06 ENCOUNTER — ANTI-COAG VISIT (OUTPATIENT)
Dept: CARDIOLOGY CLINIC | Age: 77
End: 2022-06-06

## 2022-06-06 DIAGNOSIS — Z79.01 LONG TERM (CURRENT) USE OF ANTICOAGULANTS: Primary | ICD-10-CM

## 2022-06-06 DIAGNOSIS — Z95.2 S/P AVR (AORTIC VALVE REPLACEMENT): ICD-10-CM

## 2022-06-06 LAB — INR BLD: 3

## 2022-06-06 NOTE — PROGRESS NOTES
Anticoagulation Summary  As of 6/6/2022    INR goal:  2.5-3.5   TTR:  --   INR used for dosing:     Warfarin maintenance plan:  2 mg (2 mg x 1) every Mon, Wed, Fri; 3 mg (2 mg x 1.5) all other days   Weekly warfarin total:  18 mg   Plan last modified:  Lucinda Watkins MA (5/27/2022)   Next INR check:  6/9/2022   Target end date:   Indefinite    Indications    Long term (current) use of anticoagulants [Z79.01]  S/P AVR (aortic valve replacement) [Z95.2]             Anticoagulation Episode Summary     INR check location:  Home Draw    Preferred lab:      Send INR reminders to:  [facility] CARDIOLOGY YANNA PT    Comments:        Anticoagulation Care Providers     Provider Role Specialty Phone number    Becky Rod MD [facility] Cardiology [phone]

## 2022-06-10 ENCOUNTER — ANTI-COAG VISIT (OUTPATIENT)
Dept: CARDIOLOGY CLINIC | Age: 77
End: 2022-06-10

## 2022-06-10 DIAGNOSIS — Z79.01 LONG TERM (CURRENT) USE OF ANTICOAGULANTS: Primary | ICD-10-CM

## 2022-06-10 DIAGNOSIS — Z95.2 S/P AVR (AORTIC VALVE REPLACEMENT): ICD-10-CM

## 2022-06-10 LAB — INR BLD: 3.4

## 2022-06-10 NOTE — PROGRESS NOTES
Anticoagulation Summary  As of 6/10/2022    INR goal:  2.5-3.5   TTR:  100.0 % (3 d)   INR used for dosing:  3.40 (6/9/2022)   Warfarin maintenance plan:  2 mg (2 mg x 1) every Mon, Wed, Fri; 3 mg (2 mg x 1.5) all other days   Weekly warfarin total:  18 mg   Plan last modified:  Tonya Becerra MA (5/27/2022)   Next INR check:  6/16/2022   Target end date:   Indefinite    Indications    Long term (current) use of anticoagulants [Z79.01]  S/P AVR (aortic valve replacement) [Z95.2]             Anticoagulation Episode Summary     INR check location:  Home Draw    Preferred lab:      Send INR reminders to:  [facility] CARDIOLOGY YANNA PT    Comments:        Anticoagulation Care Providers     Provider Role Specialty Phone number    Maria Teresa Mason MD [facility] Cardiology [phone]

## 2022-06-20 ENCOUNTER — ANTI-COAG VISIT (OUTPATIENT)
Dept: CARDIOLOGY CLINIC | Age: 77
End: 2022-06-20

## 2022-06-20 DIAGNOSIS — Z95.2 S/P AVR (AORTIC VALVE REPLACEMENT): ICD-10-CM

## 2022-06-20 DIAGNOSIS — Z79.01 LONG TERM (CURRENT) USE OF ANTICOAGULANTS: Primary | ICD-10-CM

## 2022-06-20 LAB — INR BLD: 2

## 2022-06-20 NOTE — PROGRESS NOTES
Anticoagulation Summary  As of 2022    INR goal:  2.5-3.5   TTR:  74.0 % (1.6 wk)   INR used for dosin.00 (2022)   Warfarin maintenance plan:  2 mg (2 mg x 1) every Mon, Wed, Fri; 3 mg (2 mg x 1.5) all other days   Weekly warfarin total:  18 mg   Plan last modified:  Shannan Aquino MA (2022)   Next INR check:  2022   Target end date:   Indefinite    Indications    Long term (current) use of anticoagulants [Z79.01]  S/P AVR (aortic valve replacement) [Z95.2]             Anticoagulation Episode Summary     INR check location:  Home Draw    Preferred lab:      Send INR reminders to:  [facility] CARDIOLOGY YANNA PT    Comments:        Anticoagulation Care Providers     Provider Role Specialty Phone number    Brittany Merrill MD [facility] Cardiology [phone]

## 2022-06-27 ENCOUNTER — ANTI-COAG VISIT (OUTPATIENT)
Dept: CARDIOLOGY CLINIC | Age: 77
End: 2022-06-27

## 2022-06-27 DIAGNOSIS — Z79.01 LONG TERM (CURRENT) USE OF ANTICOAGULANTS: Primary | ICD-10-CM

## 2022-06-27 DIAGNOSIS — Z95.2 S/P AVR (AORTIC VALVE REPLACEMENT): ICD-10-CM

## 2022-06-27 LAB — INR BLD: 2.4

## 2022-06-27 NOTE — PROGRESS NOTES
Anticoagulation Summary  As of 2022    INR goal:  2.5-3.5   TTR:  40.7 % (2.9 wk)   INR used for dosin.40 (2022)   Warfarin maintenance plan:  2 mg (2 mg x 1) every Mon, Wed, Fri; 3 mg (2 mg x 1.5) all other days   Weekly warfarin total:  18 mg   Plan last modified:  Fredrich Habermann, MA (2022)   Next INR check:  2022   Target end date:   Indefinite    Indications    Long term (current) use of anticoagulants [Z79.01]  S/P AVR (aortic valve replacement) [Z95.2]             Anticoagulation Episode Summary     INR check location:  Home Draw    Preferred lab:      Send INR reminders to:  [facility] CARDIOLOGY YANNA PT    Comments:        Anticoagulation Care Providers     Provider Role Specialty Phone number    Willard Carrion MD [facility] Cardiology [phone]

## 2022-07-05 ENCOUNTER — ANTI-COAG VISIT (OUTPATIENT)
Dept: CARDIOLOGY CLINIC | Age: 77
End: 2022-07-05

## 2022-07-05 DIAGNOSIS — Z79.01 LONG TERM (CURRENT) USE OF ANTICOAGULANTS: Primary | ICD-10-CM

## 2022-07-05 DIAGNOSIS — Z95.2 S/P AVR (AORTIC VALVE REPLACEMENT): ICD-10-CM

## 2022-07-05 LAB — INR BLD: 2.3

## 2022-07-05 NOTE — PROGRESS NOTES
Anticoagulation Summary  As of 2022    INR goal:  2.5-3.5   TTR:  32.6 % (3.6 wk)   INR used for dosin.30 (2022)   Warfarin maintenance plan:  2 mg (2 mg x 1) every Mon, Fri; 3 mg (2 mg x 1.5) all other days   Weekly warfarin total:  19 mg   Plan last modified:  Colette Darnell MA (2022)   Next INR check:     Target end date:   Indefinite    Indications    Long term (current) use of anticoagulants [Z79.01]  S/P AVR (aortic valve replacement) [Z95.2]             Anticoagulation Episode Summary     INR check location:  Home Draw    Preferred lab:      Send INR reminders to:  [facility] CARDIOLOGY YANNA PT    Comments:        Anticoagulation Care Providers     Provider Role Specialty Phone number    Jayce Cooney MD [facility] Cardiology [phone]

## 2022-07-06 ENCOUNTER — ANTI-COAG VISIT (OUTPATIENT)
Dept: CARDIOLOGY CLINIC | Age: 77
End: 2022-07-06

## 2022-07-06 DIAGNOSIS — Z95.2 S/P AVR (AORTIC VALVE REPLACEMENT): ICD-10-CM

## 2022-07-06 DIAGNOSIS — Z79.01 LONG TERM (CURRENT) USE OF ANTICOAGULANTS: Primary | ICD-10-CM

## 2022-07-06 LAB — INR BLD: 2.4

## 2022-07-07 LAB — INR BLD: 2

## 2022-07-08 ENCOUNTER — ANTI-COAG VISIT (OUTPATIENT)
Dept: CARDIOLOGY CLINIC | Age: 77
End: 2022-07-08

## 2022-07-08 DIAGNOSIS — Z95.2 S/P AVR (AORTIC VALVE REPLACEMENT): ICD-10-CM

## 2022-07-08 DIAGNOSIS — Z79.01 LONG TERM (CURRENT) USE OF ANTICOAGULANTS: Primary | ICD-10-CM

## 2022-07-15 ENCOUNTER — ANTI-COAG VISIT (OUTPATIENT)
Dept: CARDIOLOGY CLINIC | Age: 77
End: 2022-07-15

## 2022-07-15 DIAGNOSIS — Z79.01 LONG TERM (CURRENT) USE OF ANTICOAGULANTS: Primary | ICD-10-CM

## 2022-07-15 DIAGNOSIS — Z95.2 S/P AVR (AORTIC VALVE REPLACEMENT): ICD-10-CM

## 2022-07-15 LAB — INR BLD: 2.9

## 2022-07-15 NOTE — PROGRESS NOTES
Anticoagulation Summary  As of 7/15/2022      INR goal:  2.5-3.5   TTR:  30.0 % (1.3 mo)   INR used for dosin.90 (7/15/2022)   Warfarin maintenance plan:  2 mg (2 mg x 1) every Mon, Fri; 3 mg (2 mg x 1.5) all other days   Weekly warfarin total:  19 mg   Plan last modified:  Michoacano Sharp MA (2022)   Next INR check:  2022   Target end date:   Indefinite    Indications    Long term (current) use of anticoagulants [Z79.01]  S/P AVR (aortic valve replacement) [Z95.2]                 Anticoagulation Episode Summary       INR check location:  Home Draw    Preferred lab:      Send [facility]ers [facility]:  [facility]    Comments:            Anticoagulation Care Providers       Provider Role Specialty Phone number    Hipolito Cowan MD [facility] Cardiology [phone]

## 2022-07-22 ENCOUNTER — ANTI-COAG VISIT (OUTPATIENT)
Dept: CARDIOLOGY CLINIC | Age: 77
End: 2022-07-22

## 2022-07-22 DIAGNOSIS — Z79.01 LONG TERM (CURRENT) USE OF ANTICOAGULANTS: Primary | ICD-10-CM

## 2022-07-22 DIAGNOSIS — Z95.2 S/P AVR (AORTIC VALVE REPLACEMENT): ICD-10-CM

## 2022-07-22 LAB — INR BLD: 2.7

## 2022-07-22 NOTE — PROGRESS NOTES
Anticoagulation Summary  As of 2022      INR goal:  2.5-3.5   TTR:  39.3 % (1.5 mo)   INR used for dosin.70 (2022)   Warfarin maintenance plan:  2 mg (2 mg x 1) every Mon, Fri; 3 mg (2 mg x 1.5) all other days   Weekly warfarin total:  19 mg   Plan last modified:  Boyd Hare MA (2022)   Next INR check:  2022   Target end date:   Indefinite    Indications    Long term (current) use of anticoagulants [Z79.01]  S/P AVR (aortic valve replacement) [Z95.2]                 Anticoagulation Episode Summary       INR check location:  Home Draw    Preferred lab:      Send INR reminders to:  [facility] CARDIOLOGY YANNA PT    Comments:            Anticoagulation Care Providers       Provider Role Specialty Phone number    Leeann Turcios MD [facility] Cardiology [phone]

## 2022-08-02 ENCOUNTER — ANTI-COAG VISIT (OUTPATIENT)
Dept: CARDIOLOGY CLINIC | Age: 77
End: 2022-08-02

## 2022-08-02 DIAGNOSIS — Z95.2 S/P AVR (AORTIC VALVE REPLACEMENT): ICD-10-CM

## 2022-08-02 DIAGNOSIS — Z79.01 LONG TERM (CURRENT) USE OF ANTICOAGULANTS: Primary | ICD-10-CM

## 2022-08-02 LAB — INR BLD: 2.4

## 2022-08-02 NOTE — PROGRESS NOTES
Anticoagulation Summary  As of 2022      INR goal:  2.5-3.5   TTR:  43.5 % (1.8 mo)   INR used for dosin.40 (2022)   Warfarin maintenance plan:  2 mg (2 mg x 1) every Mon, Fri; 3 mg (2 mg x 1.5) all other days   Weekly warfarin total:  19 mg   Plan last modified:  Linda Vicente MA (2022)   Next INR check:  2022   Target end date:   Indefinite    Indications    Long term (current) use of anticoagulants [Z79.01]  S/P AVR (aortic valve replacement) [Z95.2]                 Anticoagulation Episode Summary       INR check location:  Home Draw    Preferred lab:      Send INR reminders to:  [facility] CARDIOLOGY YANNA PT    Comments:            Anticoagulation Care Providers       Provider Role Specialty Phone number    Genoveva Doherty MD [facility] Cardiology [phone]

## 2022-08-08 ENCOUNTER — ANTI-COAG VISIT (OUTPATIENT)
Dept: CARDIOLOGY CLINIC | Age: 77
End: 2022-08-08

## 2022-08-08 DIAGNOSIS — Z95.2 S/P AVR (AORTIC VALVE REPLACEMENT): ICD-10-CM

## 2022-08-08 DIAGNOSIS — Z79.01 LONG TERM (CURRENT) USE OF ANTICOAGULANTS: Primary | ICD-10-CM

## 2022-08-08 LAB — INR BLD: 2.4

## 2022-08-09 NOTE — PROGRESS NOTES
Will increase weekly dose and recheck in 2 weeks//KM    Anticoagulation Summary  As of 2022      INR goal:  2.5-3.5   TTR:  36.6 % (2.1 mo)   INR used for dosin.40 (2022)   Warfarin maintenance plan:  2 mg (2 mg x 1) every Fri; 3 mg (2 mg x 1.5) all other days   Weekly warfarin total:  20 mg   Plan last modified:  Shaan Rodriguez MA (2022)   Next INR check:  2022   Target end date:   Indefinite    Indications    Long term (current) use of anticoagulants [Z79.01]  S/P AVR (aortic valve replacement) [Z95.2]                 Anticoagulation Episode Summary       INR check location:  Home Draw    Preferred lab:      Send INR reminders to:  [facility] CARDIOLOGY YANNA PT    Comments:            Anticoagulation Care Providers       Provider Role Specialty Phone number    Basil Roblero MD [facility] Cardiology [phone]

## 2022-08-18 ENCOUNTER — ANTI-COAG VISIT (OUTPATIENT)
Dept: CARDIOLOGY CLINIC | Age: 77
End: 2022-08-18

## 2022-08-18 DIAGNOSIS — Z95.2 S/P AVR (AORTIC VALVE REPLACEMENT): ICD-10-CM

## 2022-08-18 DIAGNOSIS — Z79.01 LONG TERM (CURRENT) USE OF ANTICOAGULANTS: Primary | ICD-10-CM

## 2022-08-18 LAB — INR BLD: 2.4

## 2022-08-19 ENCOUNTER — ANTI-COAG VISIT (OUTPATIENT)
Dept: CARDIOLOGY CLINIC | Age: 77
End: 2022-08-19

## 2022-08-19 DIAGNOSIS — Z95.2 S/P AVR (AORTIC VALVE REPLACEMENT): ICD-10-CM

## 2022-08-19 DIAGNOSIS — Z79.01 LONG TERM (CURRENT) USE OF ANTICOAGULANTS: Primary | ICD-10-CM

## 2022-08-19 LAB — INR BLD: 2.4

## 2022-08-19 NOTE — PROGRESS NOTES
Anticoagulation Summary  As of 2022      INR goal:  2.5-3.5   TTR:  31.6 % (2.4 mo)   INR used for dosin.40 (2022)   Warfarin maintenance plan:  2 mg (2 mg x 1) every Fri; 3 mg (2 mg x 1.5) all other days   Weekly warfarin total:  20 mg   Plan last modified:  Jeimy Lynn MA (2022)   Next INR check:  2022   Target end date:   Indefinite    Indications    Long term (current) use of anticoagulants [Z79.01]  S/P AVR (aortic valve replacement) [Z95.2]                 Anticoagulation Episode Summary       INR check location:  Home Draw    Preferred lab:      Send INR reminders to:  [facility] CARDIOLOGY YANNA PT    Comments:            Anticoagulation Care Providers       Provider Role Specialty Phone number    Matt Garcia MD [facility] Cardiology [phone]

## 2022-08-25 LAB
INR BLD: 2.4
INR BLD: 2.4

## 2022-08-26 ENCOUNTER — ANTI-COAG VISIT (OUTPATIENT)
Dept: CARDIOLOGY CLINIC | Age: 77
End: 2022-08-26

## 2022-08-26 DIAGNOSIS — Z95.2 S/P AVR (AORTIC VALVE REPLACEMENT): ICD-10-CM

## 2022-08-26 DIAGNOSIS — Z79.01 LONG TERM (CURRENT) USE OF ANTICOAGULANTS: Primary | ICD-10-CM

## 2022-08-26 NOTE — PROGRESS NOTES
Tablet strength and weekly dosing schedule confirmed today. Permanent change to 3 mg on Fridays; now taking 3 mg every day. LVM for patient. Recheck in one week.

## 2022-08-26 NOTE — PROGRESS NOTES
Anticoagulation Summary  As of 2022      INR goal:  2.5-3.5   TTR:  28.8 % (2.7 mo)   INR used for dosin.40 (2022)   Warfarin maintenance plan:  3 mg (2 mg x 1.5) every day   Weekly warfarin total:  21 mg   Plan last modified:  Syed Perez LPN (7097)   Next INR check:  2022   Target end date:   Indefinite    Indications    Long term (current) use of anticoagulants [Z79.01]  S/P AVR (aortic valve replacement) [Z95.2]                 Anticoagulation Episode Summary       INR check location:  Home Draw    Preferred lab:      Send INR reminders to:  [facility] CARDIOLOGY YANNA PT    Comments:            Anticoagulation Care Providers       Provider Role Specialty Phone number    Jose Cunha MD [facility] Cardiology [phone]

## 2022-09-06 ENCOUNTER — ANTI-COAG VISIT (OUTPATIENT)
Dept: CARDIOLOGY CLINIC | Age: 77
End: 2022-09-06

## 2022-09-06 DIAGNOSIS — Z95.2 S/P AVR (AORTIC VALVE REPLACEMENT): ICD-10-CM

## 2022-09-06 DIAGNOSIS — Z79.01 LONG TERM (CURRENT) USE OF ANTICOAGULANTS: Primary | ICD-10-CM

## 2022-09-06 LAB — INR BLD: 2.9

## 2022-09-06 NOTE — PROGRESS NOTES
Anticoagulation Summary  As of 2022      INR goal:  2.5-3.5   TTR:  33.4 % (2.9 mo)   INR used for dosin.90 (2022)   Warfarin maintenance plan:  3 mg (2 mg x 1.5) every day   Weekly warfarin total:  21 mg   Plan last modified:  Caitlyn Baker LPN (4084)   Next INR check:  2022   Target end date:   Indefinite    Indications    Long term (current) use of anticoagulants [Z79.01]  S/P AVR (aortic valve replacement) [Z95.2]                 Anticoagulation Episode Summary       INR check location:  Home Draw    Preferred lab:      Send INR reminders to:  [facility] CARDIOLOGY YANNA PT    Comments:            Anticoagulation Care Providers       Provider Role Specialty Phone number    Angela Navarro MD [facility] Cardiology [phone]

## 2022-09-12 ENCOUNTER — ANTI-COAG VISIT (OUTPATIENT)
Dept: CARDIOLOGY CLINIC | Age: 77
End: 2022-09-12

## 2022-09-12 DIAGNOSIS — Z95.2 S/P AVR (AORTIC VALVE REPLACEMENT): ICD-10-CM

## 2022-09-12 DIAGNOSIS — Z79.01 LONG TERM (CURRENT) USE OF ANTICOAGULANTS: Primary | ICD-10-CM

## 2022-09-12 LAB
INR BLD: 2.3
INR BLD: 2.3

## 2022-09-12 NOTE — PROGRESS NOTES
Tablet strength and weekly dosing schedule confirmed today. One time dose increase; recheck in two weeks.

## 2022-09-23 ENCOUNTER — ANTI-COAG VISIT (OUTPATIENT)
Dept: CARDIOLOGY CLINIC | Age: 77
End: 2022-09-23

## 2022-09-23 DIAGNOSIS — Z79.01 LONG TERM (CURRENT) USE OF ANTICOAGULANTS: Primary | ICD-10-CM

## 2022-09-23 DIAGNOSIS — Z95.2 S/P AVR (AORTIC VALVE REPLACEMENT): ICD-10-CM

## 2022-09-23 LAB — INR BLD: 3.1

## 2022-09-23 NOTE — PROGRESS NOTES
Anticoagulation Summary  As of 9/23/2022      INR goal:  2.5-3.5   TTR:  40.0 % (3.6 mo)   INR used for dosing:  3.10 (9/21/2022)   Warfarin maintenance plan:  3 mg (2 mg x 1.5) every day   Weekly warfarin total:  21 mg   Plan last modified:  Josr Edmonds LPN (9/20/4013)   Next INR check:  9/30/2022   Target end date:   Indefinite    Indications    Long term (current) use of anticoagulants [Z79.01]  S/P AVR (aortic valve replacement) [Z95.2]                 Anticoagulation Episode Summary       INR check location:  Home Draw    Preferred lab:      Send INR reminders to:  [facility] CARDIOLOGY YANNA PT    Comments:            Anticoagulation Care Providers       Provider Role Specialty Phone number    Simón Phoenix MD [facility] Cardiology [phone]

## 2022-09-29 LAB — INR BLD: 2.3

## 2022-09-30 ENCOUNTER — ANTI-COAG VISIT (OUTPATIENT)
Dept: CARDIOLOGY CLINIC | Age: 77
End: 2022-09-30

## 2022-09-30 DIAGNOSIS — Z79.01 LONG TERM (CURRENT) USE OF ANTICOAGULANTS: Primary | ICD-10-CM

## 2022-09-30 DIAGNOSIS — Z95.2 S/P AVR (AORTIC VALVE REPLACEMENT): ICD-10-CM

## 2022-09-30 NOTE — PROGRESS NOTES
Anticoagulation Summary  As of 2022      INR goal:  2.5-3.5   TTR:  42.2 % (3.8 mo)   INR used for dosin.30 (2022)   Warfarin maintenance plan:  3 mg (2 mg x 1.5) every day   Weekly warfarin total:  21 mg   Plan last modified:  Elisa Baires LPN ()   Next INR check:  10/7/2022   Target end date:   Indefinite    Indications    Long term (current) use of anticoagulants [Z79.01]  S/P AVR (aortic valve replacement) [Z95.2]                 Anticoagulation Episode Summary       INR check location:  Home Draw    Preferred lab:      Send INR reminders to:  [facility] CARDIOLOGY YANNA PT    Comments:            Anticoagulation Care Providers       Provider Role Specialty Phone number    Cathy Interiano MD [facility] Cardiology [phone]

## 2022-10-07 ENCOUNTER — ANTI-COAG VISIT (OUTPATIENT)
Dept: CARDIOLOGY CLINIC | Age: 77
End: 2022-10-07

## 2022-10-07 DIAGNOSIS — Z95.2 S/P AVR (AORTIC VALVE REPLACEMENT): ICD-10-CM

## 2022-10-07 DIAGNOSIS — Z79.01 LONG TERM (CURRENT) USE OF ANTICOAGULANTS: Primary | ICD-10-CM

## 2022-10-07 LAB — INR BLD: 2.9

## 2022-10-07 NOTE — PROGRESS NOTES
Anticoagulation Summary  As of 10/7/2022      INR goal:  2.5-3.5   TTR:  43.9 % (4.1 mo)   INR used for dosin.90 (10/7/2022)   Warfarin maintenance plan:  3 mg (2 mg x 1.5) every day   Weekly warfarin total:  21 mg   Plan last modified:  Piyush Cates LPN ()   Next INR check:  10/14/2022   Target end date:   Indefinite    Indications    Long term (current) use of anticoagulants [Z79.01]  S/P AVR (aortic valve replacement) [Z95.2]                 Anticoagulation Episode Summary       INR check location:  Home Draw    Preferred lab:      Send INR reminders to:  [facility] CARDIOLOGY YANNA PT    Comments:            Anticoagulation Care Providers       Provider Role Specialty Phone number    Adama Darling MD [facility] Cardiology [phone]

## 2022-10-20 ENCOUNTER — ANTI-COAG VISIT (OUTPATIENT)
Dept: CARDIOLOGY CLINIC | Age: 77
End: 2022-10-20

## 2022-10-20 DIAGNOSIS — Z95.2 S/P AVR (AORTIC VALVE REPLACEMENT): ICD-10-CM

## 2022-10-20 DIAGNOSIS — Z79.01 LONG TERM (CURRENT) USE OF ANTICOAGULANTS: Primary | ICD-10-CM

## 2022-10-20 LAB — INR BLD: 3

## 2022-10-20 NOTE — PROGRESS NOTES
Anticoagulation Summary  As of 10/20/2022      INR goal:  2.5-3.5   TTR:  48.1 % (4.4 mo)   INR used for dosing:  3.00 (10/17/2022)   Warfarin maintenance plan:  3 mg (2 mg x 1.5) every day   Weekly warfarin total:  21 mg   Plan last modified:  Laurie Hopson LPN (2/46/0532)   Next INR check:  11/3/2022   Target end date:   Indefinite    Indications    Long term (current) use of anticoagulants [Z79.01]  S/P AVR (aortic valve replacement) [Z95.2]                 Anticoagulation Episode Summary       INR check location:  Home Draw    Preferred lab:      Send INR reminders to:  [facility] CARDIOLOGY YANNA PT    Comments:            Anticoagulation Care Providers       Provider Role Specialty Phone number    Irma Molina MD [facility] Cardiology [phone]

## 2022-10-27 ENCOUNTER — ANTI-COAG VISIT (OUTPATIENT)
Dept: CARDIOLOGY CLINIC | Age: 77
End: 2022-10-27

## 2022-10-27 DIAGNOSIS — Z79.01 LONG TERM (CURRENT) USE OF ANTICOAGULANTS: Primary | ICD-10-CM

## 2022-10-27 DIAGNOSIS — Z95.2 S/P AVR (AORTIC VALVE REPLACEMENT): ICD-10-CM

## 2022-10-27 LAB — INR BLD: 2.8

## 2022-10-27 NOTE — PROGRESS NOTES
Anticoagulation Summary  As of 10/27/2022      INR goal:  2.5-3.5   TTR:  51.4 % (4.7 mo)   INR used for dosin.80 (10/26/2022)   Warfarin maintenance plan:  3 mg (2 mg x 1.5) every day; Starting 10/27/2022   Weekly warfarin total:  21 mg   Plan last modified:  Maureen Vickers LPN ()   Next INR check:  11/10/2022   Target end date:   Indefinite    Indications    Long term (current) use of anticoagulants [Z79.01]  S/P AVR (aortic valve replacement) [Z95.2]                 Anticoagulation Episode Summary       INR check location:  Home Draw    Preferred lab:      Send INR reminders to:  [facility] CARDIOLOGY YANNA PT    Comments:            Anticoagulation Care Providers       Provider Role Specialty Phone number    Moisés Navarro MD [facility] Cardiology [phone]

## 2022-11-09 LAB — INR BLD: 2.8

## 2022-11-10 ENCOUNTER — ANTI-COAG VISIT (OUTPATIENT)
Dept: CARDIOLOGY CLINIC | Age: 77
End: 2022-11-10

## 2022-11-10 DIAGNOSIS — Z95.2 S/P AVR (AORTIC VALVE REPLACEMENT): ICD-10-CM

## 2022-11-10 DIAGNOSIS — Z79.01 LONG TERM (CURRENT) USE OF ANTICOAGULANTS: Primary | ICD-10-CM

## 2022-11-10 NOTE — PROGRESS NOTES
Anticoagulation Summary  As of 11/10/2022      INR goal:  2.5-3.5   TTR:  55.5 % (5.2 mo)   INR used for dosin.80 (2022)   Warfarin maintenance plan:  3 mg (2 mg x 1.5) every day; Starting 11/10/2022   Weekly warfarin total:  21 mg   Plan last modified:  Hans Duron LPN ()   Next INR check:  2022   Target end date:   Indefinite    Indications    Long term (current) use of anticoagulants [Z79.01]  S/P AVR (aortic valve replacement) [Z95.2]                 Anticoagulation Episode Summary       INR check location:  Home Draw    Preferred lab:      Send INR reminders to:  [facility] CARDIOLOGY YANNA PT    Comments:            Anticoagulation Care Providers       Provider Role Specialty Phone number    Sherrie Wilks MD [facility] Cardiology [phone]

## 2022-11-21 LAB — INR BLD: 2.8

## 2022-11-22 ENCOUNTER — ANTI-COAG VISIT (OUTPATIENT)
Dept: CARDIOLOGY CLINIC | Age: 77
End: 2022-11-22

## 2022-11-22 DIAGNOSIS — Z95.2 S/P AVR (AORTIC VALVE REPLACEMENT): ICD-10-CM

## 2022-11-22 DIAGNOSIS — Z79.01 LONG TERM (CURRENT) USE OF ANTICOAGULANTS: Primary | ICD-10-CM

## 2022-11-22 NOTE — PROGRESS NOTES
Anticoagulation Summary  As of 2022      INR goal:  2.5-3.5   TTR:  58.2 % (5.5 mo)   INR used for dosin.80 (2022)   Warfarin maintenance plan:  3 mg (2 mg x 1.5) every day; Starting 2022   Weekly warfarin total:  21 mg   Plan last modified:  Ankush Gutierrez LPN ()   Next INR check:  2022   Target end date:   Indefinite    Indications    Long term (current) use of anticoagulants [Z79.01]  S/P AVR (aortic valve replacement) [Z95.2]                 Anticoagulation Episode Summary       INR check location:  Home Draw    Preferred lab:      Send INR reminders to:  [facility] CARDIOLOGY YANNA PT    Comments:            Anticoagulation Care Providers       Provider Role Specialty Phone number    Rocío Joel MD [facility] Cardiology [phone]

## 2022-12-08 ENCOUNTER — ANTI-COAG VISIT (OUTPATIENT)
Dept: CARDIOLOGY CLINIC | Age: 77
End: 2022-12-08

## 2022-12-08 DIAGNOSIS — Z79.01 LONG TERM (CURRENT) USE OF ANTICOAGULANTS: Primary | ICD-10-CM

## 2022-12-08 DIAGNOSIS — Z95.2 S/P AVR (AORTIC VALVE REPLACEMENT): ICD-10-CM

## 2022-12-08 LAB — INR BLD: 2.7

## 2022-12-08 NOTE — PROGRESS NOTES
Pt is in range. Anticoagulation Summary  As of 2022      INR goal:  2.5-3.5   TTR:  62.1 % (6.1 mo)   INR used for dosin.70 (2022)   Warfarin maintenance plan:  3 mg (2 mg x 1.5) every day; Starting 2022   Weekly warfarin total:  21 mg   Plan last modified:  Ankush Gutierrez LPN ()   Next INR check:  2022   Target end date:   Indefinite    Indications    Long term (current) use of anticoagulants [Z79.01]  S/P AVR (aortic valve replacement) [Z95.2]                 Anticoagulation Episode Summary       INR check location:  Home Draw    Preferred lab:      Send INR reminders to:  [facility] CARDIOLOGY YANNA MARTINEZ    Comments:            Anticoagulation Care Providers       Provider Role Specialty Phone number    Rocío Joel MD [facility] Cardiology [phone]

## 2022-12-23 ENCOUNTER — ANTI-COAG VISIT (OUTPATIENT)
Dept: CARDIOLOGY CLINIC | Age: 77
End: 2022-12-23

## 2022-12-23 DIAGNOSIS — Z79.01 LONG TERM (CURRENT) USE OF ANTICOAGULANTS: Primary | ICD-10-CM

## 2022-12-23 DIAGNOSIS — Z95.2 S/P AVR (AORTIC VALVE REPLACEMENT): ICD-10-CM

## 2022-12-23 LAB — INR BLD: 2.7

## 2022-12-23 NOTE — PROGRESS NOTES
Anticoagulation Summary  As of 2022      INR goal:  2.5-3.5   TTR:  65.4 % (6.6 mo)   INR used for dosin.70 (2022)   Warfarin maintenance plan:  3 mg (2 mg x 1.5) every day; Starting 2022   Weekly warfarin total:  21 mg   Plan last modified:  Zuleyka Mayorga LPN ()   Next INR check:  2022   Target end date:   Indefinite    Indications    Long term (current) use of anticoagulants [Z79.01]  S/P AVR (aortic valve replacement) [Z95.2]                 Anticoagulation Episode Summary       INR check location:  Home Draw    Preferred lab:      Send INR reminders to:  [facility] CARDIOLOGY YANNA PT    Comments:            Anticoagulation Care Providers       Provider Role Specialty Phone number    Chalmer Hatchet, MD [facility] Cardiology [phone]

## 2023-01-24 ENCOUNTER — ANTI-COAG VISIT (OUTPATIENT)
Dept: CARDIOLOGY CLINIC | Age: 78
End: 2023-01-24

## 2023-01-24 DIAGNOSIS — Z95.2 S/P AVR (AORTIC VALVE REPLACEMENT): ICD-10-CM

## 2023-01-24 DIAGNOSIS — Z79.01 LONG TERM (CURRENT) USE OF ANTICOAGULANTS: Primary | ICD-10-CM

## 2023-01-24 LAB — INR BLD: 2.7

## 2023-01-24 PROCEDURE — 93793 ANTICOAG MGMT PT WARFARIN: CPT | Performed by: INTERNAL MEDICINE

## 2023-01-24 PROCEDURE — 85610 PROTHROMBIN TIME: CPT | Performed by: INTERNAL MEDICINE

## 2023-01-24 NOTE — PROGRESS NOTES
Anticoagulation Summary  As of 2023      INR goal:  2.5-3.5   TTR:  69.9 % (7.6 mo)   INR used for dosin.70 (2023)   Warfarin maintenance plan:  3 mg (2 mg x 1.5) every day; Starting 2023   Weekly warfarin total:  21 mg   Plan last modified:  Raquel Luis LPN ()   Next INR check:  2023   Target end date:   Indefinite    Indications    Long term (current) use of anticoagulants [Z79.01]  S/P AVR (aortic valve replacement) [Z95.2]                 Anticoagulation Episode Summary       INR check location:  Home Draw    Preferred lab:      Send INR reminders to:  [facility] CARDIOLOGY YANNA PT    Comments:            Anticoagulation Care Providers       Provider Role Specialty Phone number    Ismael Eisenberg MD [facility] Cardiology [phone]

## 2023-03-21 ENCOUNTER — ANTI-COAG VISIT (OUTPATIENT)
Dept: CARDIOLOGY CLINIC | Age: 78
End: 2023-03-21

## 2023-03-21 DIAGNOSIS — Z95.2 S/P AVR (AORTIC VALVE REPLACEMENT): ICD-10-CM

## 2023-03-21 DIAGNOSIS — Z79.01 LONG TERM (CURRENT) USE OF ANTICOAGULANTS: Primary | ICD-10-CM

## 2023-03-21 LAB — INR BLD: 3.7

## 2023-03-27 ENCOUNTER — ANTI-COAG VISIT (OUTPATIENT)
Dept: CARDIOLOGY CLINIC | Age: 78
End: 2023-03-27

## 2023-03-27 DIAGNOSIS — Z79.01 LONG TERM (CURRENT) USE OF ANTICOAGULANTS: Primary | ICD-10-CM

## 2023-03-27 DIAGNOSIS — Z95.2 S/P AVR (AORTIC VALVE REPLACEMENT): ICD-10-CM

## 2023-03-27 LAB — INR BLD: 2.8

## 2023-03-27 PROCEDURE — 93793 ANTICOAG MGMT PT WARFARIN: CPT | Performed by: INTERNAL MEDICINE

## 2023-03-27 NOTE — PROGRESS NOTES
Anticoagulation Summary  As of 3/27/2023      INR goal:  2.5-3.5   TTR:  72.0 % (9.8 mo)   INR used for dosin.80 (3/27/2023)   Warfarin maintenance plan:  3 mg (2 mg x 1.5) every day; Starting 3/27/2023   Weekly warfarin total:  21 mg   Plan last modified:  Juancarlos Sue LPN ()   Next INR check:  4/10/2023   Target end date:   Indefinite    Indications    Long term (current) use of anticoagulants [Z79.01]  S/P AVR (aortic valve replacement) [Z95.2]                 Anticoagulation Episode Summary       INR check location:  Home Draw    Preferred lab:      Send INR reminders to:  [facility] CARDIOLOGY YANNA PT    Comments:            Anticoagulation Care Providers       Provider Role Specialty Phone number    Basil Roblero MD [facility] Cardiology [phone]

## 2023-05-09 LAB — INR BLD: 2.8

## 2023-05-10 ENCOUNTER — ANTI-COAG VISIT (OUTPATIENT)
Age: 78
End: 2023-05-10

## 2023-05-10 DIAGNOSIS — Z79.01 LONG TERM (CURRENT) USE OF ANTICOAGULANTS: Primary | ICD-10-CM

## 2023-05-10 DIAGNOSIS — Z95.2 S/P AVR (AORTIC VALVE REPLACEMENT): ICD-10-CM

## 2023-05-10 PROCEDURE — 93793 ANTICOAG MGMT PT WARFARIN: CPT | Performed by: INTERNAL MEDICINE

## 2023-05-10 NOTE — PROGRESS NOTES
Anticoagulation Summary  As of 5/10/2023      INR goal:  2.5-3.5   TTR:  75.5 % (11.2 mo)   INR used for dosin.80 (2023)   Warfarin maintenance plan:  3 mg (2 mg x 1.5) every day   Weekly warfarin total:  21 mg   Plan last modified:  Whit Rivas LPN ()   Next INR check:  2023   Target end date:   Indefinite    Indications    Long term (current) use of anticoagulants [Z79.01]  S/P AVR (aortic valve replacement) [Z95.2]                 Anticoagulation Episode Summary       INR check location:  Home Draw    Preferred lab:      Send INR reminders to:  [facility] CARDIOLOGY YANNA PT    Comments:            Anticoagulation Care Providers       Provider Role Specialty Phone number    Christiano Kapoor MD [facility] Cardiology [phone]

## 2023-05-19 ENCOUNTER — ANTI-COAG VISIT (OUTPATIENT)
Age: 78
End: 2023-05-19

## 2023-05-19 DIAGNOSIS — Z79.01 LONG TERM (CURRENT) USE OF ANTICOAGULANTS: Primary | ICD-10-CM

## 2023-05-19 DIAGNOSIS — Z95.2 S/P AVR (AORTIC VALVE REPLACEMENT): ICD-10-CM

## 2023-05-19 LAB — INR BLD: 2.7

## 2023-05-30 ENCOUNTER — OFFICE VISIT (OUTPATIENT)
Age: 78
End: 2023-05-30
Payer: COMMERCIAL

## 2023-05-30 VITALS
HEIGHT: 68 IN | HEART RATE: 72 BPM | WEIGHT: 179 LBS | BODY MASS INDEX: 27.13 KG/M2 | SYSTOLIC BLOOD PRESSURE: 112 MMHG | DIASTOLIC BLOOD PRESSURE: 60 MMHG

## 2023-05-30 DIAGNOSIS — I25.10 CORONARY ARTERY DISEASE INVOLVING NATIVE HEART WITHOUT ANGINA PECTORIS, UNSPECIFIED VESSEL OR LESION TYPE: ICD-10-CM

## 2023-05-30 DIAGNOSIS — Z79.01 ANTICOAGULATION MANAGEMENT ENCOUNTER: ICD-10-CM

## 2023-05-30 DIAGNOSIS — Z51.81 ANTICOAGULATION MANAGEMENT ENCOUNTER: ICD-10-CM

## 2023-05-30 DIAGNOSIS — I10 ESSENTIAL (PRIMARY) HYPERTENSION: ICD-10-CM

## 2023-05-30 DIAGNOSIS — Z95.2 S/P AVR (AORTIC VALVE REPLACEMENT): Primary | ICD-10-CM

## 2023-05-30 PROCEDURE — 3078F DIAST BP <80 MM HG: CPT | Performed by: INTERNAL MEDICINE

## 2023-05-30 PROCEDURE — 93000 ELECTROCARDIOGRAM COMPLETE: CPT | Performed by: INTERNAL MEDICINE

## 2023-05-30 PROCEDURE — 3074F SYST BP LT 130 MM HG: CPT | Performed by: INTERNAL MEDICINE

## 2023-05-30 PROCEDURE — 1123F ACP DISCUSS/DSCN MKR DOCD: CPT | Performed by: INTERNAL MEDICINE

## 2023-05-30 PROCEDURE — 99214 OFFICE O/P EST MOD 30 MIN: CPT | Performed by: INTERNAL MEDICINE

## 2023-05-30 RX ORDER — ATORVASTATIN CALCIUM 40 MG/1
40 TABLET, FILM COATED ORAL DAILY
Qty: 90 TABLET | Refills: 3 | Status: SHIPPED | OUTPATIENT
Start: 2023-05-30

## 2023-05-30 RX ORDER — NITROGLYCERIN 0.4 MG/1
0.4 TABLET SUBLINGUAL EVERY 5 MIN PRN
Qty: 30 TABLET | Refills: 3 | Status: SHIPPED | OUTPATIENT
Start: 2023-05-30

## 2023-05-30 RX ORDER — METOPROLOL SUCCINATE 25 MG/1
25 TABLET, EXTENDED RELEASE ORAL DAILY
Qty: 90 TABLET | Refills: 3 | Status: SHIPPED | OUTPATIENT
Start: 2023-05-30

## 2023-05-30 RX ORDER — WARFARIN SODIUM 1 MG/1
1 TABLET ORAL EVERY EVENING
Qty: 30 TABLET | Refills: 3 | Status: SHIPPED | OUTPATIENT
Start: 2023-05-30

## 2023-05-30 RX ORDER — WARFARIN SODIUM 2 MG/1
2 TABLET ORAL DAILY
Qty: 90 TABLET | Refills: 3 | Status: SHIPPED | OUTPATIENT
Start: 2023-05-30

## 2023-05-30 NOTE — PROGRESS NOTES
BREE Starr  [address] [place] [address], [address] [place] [address]  PHONE: [phone]    SUBJECTIVE:   Carmen Berkowitz is a 69 y.o. male 1945   seen for a follow up visit regarding the following:     Chief Complaint   Patient presents with    Coronary Artery Disease         History of present illness: 69 y.o. male presented for follow-up 5/30/23  Has not had any issues this. 11/09/2018. hospitalized at [address] [place] [address] for endocarditis in 2018. Interval Hx:   The patient was formerly seen by Dr. April Loving. He has a previous history of coronary artery bypass grafting in 1996 with SVG to LAD, SVG to RCA. Aortic valve replacement with 25 mm St. Rogelio mechanical valve for severe aortic stenosis. The patient  had non-ST elevation myocardial infarction. He underwent PCI to left main and proximal circumflex vessel, proximal SVG graft to LAD and PCI and vein graft to LAD in 09/2018. He had subsequent hospital admissions for recurrent infections and was readmitted  in 10/2018 with elevated cardiac biomarkers. Cardiac History:     Coronary artery bypass grafting in 1986, SVG to LAD, SVG to RCA, aortic valve replacement with 25 mm St. Rogelio mechanical valve. NSTEMI 10/2018 PCI left main, circumflex with Synergy drug eluting stent x3, PCI and stenting of proximal vein graft to LAD, [place] drug eluting stent. 10/26/2018 non-ST elevation myocardial infarction with 80% distal thrombus in left main and angioplasty performed, SVG to RCA patent. Enterococcal prosthetic aortic valve endocarditis    RICHARD small aortic valve vegetation    1/2019 life long antibiotics recommended by ID. Not candidate for AVR per previous consult ser vice. 6/2019 antibiotics were discontinued by ID     10/4/47206 sinus rhythm, normal rate, normal OR intervals, ST wave normal, normal axis,  5/30/2023 EKG Sinus  Rhythm  -Frequent pvcs -ventricular trigeminy   -  Nonspecific T-abnormality.

## 2023-06-07 ENCOUNTER — ANTI-COAG VISIT (OUTPATIENT)
Age: 78
End: 2023-06-07
Payer: COMMERCIAL

## 2023-06-07 DIAGNOSIS — Z79.01 LONG TERM (CURRENT) USE OF ANTICOAGULANTS: Primary | ICD-10-CM

## 2023-06-07 DIAGNOSIS — Z95.2 S/P AVR (AORTIC VALVE REPLACEMENT): ICD-10-CM

## 2023-06-07 LAB — INR BLD: 2.5

## 2023-06-07 PROCEDURE — 93793 ANTICOAG MGMT PT WARFARIN: CPT | Performed by: INTERNAL MEDICINE

## 2023-06-07 NOTE — PROGRESS NOTES
Anticoagulation Summary  As of 2023      INR goal:  2.5-3.5   TTR:  77.5 % (1 y)   INR used for dosin.50 (2023)   Warfarin maintenance plan:  3 mg (2 mg x 1.5) every day   Weekly warfarin total:  21 mg   Plan last modified:  Meg Stringer LPN ()   Next INR check:  2023   Target end date:   Indefinite    Indications    Long term (current) use of anticoagulants [Z79.01]  S/P AVR (aortic valve replacement) [Z95.2]                 Anticoagulation Episode Summary       INR check location:  Home Draw    Preferred lab:      Send INR reminders to:  [facility] CARDIOLOGY YANNA PT    Comments:            Anticoagulation Care Providers       Provider Role Specialty Phone number    Barrington Covarrubias MD [facility] Cardiology [phone]

## 2023-06-26 ENCOUNTER — ANTI-COAG VISIT (OUTPATIENT)
Age: 78
End: 2023-06-26
Payer: COMMERCIAL

## 2023-06-26 DIAGNOSIS — Z79.01 LONG TERM (CURRENT) USE OF ANTICOAGULANTS: Primary | ICD-10-CM

## 2023-06-26 DIAGNOSIS — Z95.2 S/P AVR (AORTIC VALVE REPLACEMENT): ICD-10-CM

## 2023-06-26 LAB — INR BLD: 2.4

## 2023-06-26 PROCEDURE — 93793 ANTICOAG MGMT PT WARFARIN: CPT | Performed by: INTERNAL MEDICINE

## 2023-07-03 ENCOUNTER — ANTI-COAG VISIT (OUTPATIENT)
Age: 78
End: 2023-07-03
Payer: COMMERCIAL

## 2023-07-03 DIAGNOSIS — Z79.01 LONG TERM (CURRENT) USE OF ANTICOAGULANTS: Primary | ICD-10-CM

## 2023-07-03 DIAGNOSIS — Z95.2 S/P AVR (AORTIC VALVE REPLACEMENT): ICD-10-CM

## 2023-07-03 LAB — INR BLD: 3

## 2023-07-03 PROCEDURE — 93793 ANTICOAG MGMT PT WARFARIN: CPT | Performed by: INTERNAL MEDICINE

## 2023-07-27 ENCOUNTER — ANTI-COAG VISIT (OUTPATIENT)
Age: 78
End: 2023-07-27

## 2023-07-27 DIAGNOSIS — Z79.01 LONG TERM (CURRENT) USE OF ANTICOAGULANTS: Primary | ICD-10-CM

## 2023-07-27 DIAGNOSIS — Z95.2 S/P AVR (AORTIC VALVE REPLACEMENT): ICD-10-CM

## 2023-07-27 LAB — INR BLD: 2.9

## 2023-08-15 ENCOUNTER — ANTI-COAG VISIT (OUTPATIENT)
Age: 78
End: 2023-08-15
Payer: COMMERCIAL

## 2023-08-15 DIAGNOSIS — Z79.01 LONG TERM (CURRENT) USE OF ANTICOAGULANTS: Primary | ICD-10-CM

## 2023-08-15 DIAGNOSIS — Z95.2 S/P AVR (AORTIC VALVE REPLACEMENT): ICD-10-CM

## 2023-08-15 LAB — INR BLD: 2.8

## 2023-08-15 PROCEDURE — 93793 ANTICOAG MGMT PT WARFARIN: CPT | Performed by: INTERNAL MEDICINE

## 2023-08-15 NOTE — PATIENT INSTRUCTIONS
Reminder: Please contact the Coumadin Clinic at [phone]  when you have medication changes. Examples, new medications, antibiotics, discontinued medications, new supplements, missed doses of warfarin or if you took extra doses of warfarin. This also includes OTC medications. Notifying us helps reduce the possibility of high and low INR's. In addition, if warfarin needs to be held for any procedures, please have surgeon or physician's office contact us before holding anticoagulant. Thanks, [facility] Cardiology Coumadin Clinic.

## 2023-08-15 NOTE — PROGRESS NOTES
Anticoagulation Summary  As of 8/15/2023      INR goal:  2.5-3.5   TTR:  77.0 % (1.2 y)   INR used for dosin.80 (2023)   Warfarin maintenance plan:  3 mg (2 mg x 1.5) every day   Weekly warfarin total:  21 mg   Plan last modified:  Farrah Duffy LPN ()   Next INR check:  2023   Target end date:   Indefinite    Indications    Long term (current) use of anticoagulants [Z79.01]  S/P AVR (aortic valve replacement) [Z95.2]                 Anticoagulation Episode Summary       INR check location:  Home Draw    Preferred lab:      Send INR reminders to:  [facility] CARDIOLOGY YANNA PT    Comments:            Anticoagulation Care Providers       Provider Role Specialty Phone number    Ivan Boykin MD [facility] Cardiology [phone]

## 2023-08-29 ENCOUNTER — ANTI-COAG VISIT (OUTPATIENT)
Age: 78
End: 2023-08-29

## 2023-08-29 DIAGNOSIS — Z79.01 LONG TERM (CURRENT) USE OF ANTICOAGULANTS: Primary | ICD-10-CM

## 2023-08-29 DIAGNOSIS — Z95.2 S/P AVR (AORTIC VALVE REPLACEMENT): ICD-10-CM

## 2023-08-29 LAB — INR BLD: 2.3

## 2023-09-12 ENCOUNTER — ANTI-COAG VISIT (OUTPATIENT)
Age: 78
End: 2023-09-12
Payer: COMMERCIAL

## 2023-09-12 DIAGNOSIS — Z95.2 S/P AVR (AORTIC VALVE REPLACEMENT): ICD-10-CM

## 2023-09-12 DIAGNOSIS — Z79.01 LONG TERM (CURRENT) USE OF ANTICOAGULANTS: Primary | ICD-10-CM

## 2023-09-12 LAB — INR BLD: 3.5

## 2023-09-12 PROCEDURE — 93793 ANTICOAG MGMT PT WARFARIN: CPT | Performed by: INTERNAL MEDICINE

## 2023-09-18 ENCOUNTER — ANTI-COAG VISIT (OUTPATIENT)
Age: 78
End: 2023-09-18
Payer: COMMERCIAL

## 2023-09-18 DIAGNOSIS — Z79.01 LONG TERM (CURRENT) USE OF ANTICOAGULANTS: Primary | ICD-10-CM

## 2023-09-18 DIAGNOSIS — Z95.2 S/P AVR (AORTIC VALVE REPLACEMENT): ICD-10-CM

## 2023-09-18 LAB — INR BLD: 2.8

## 2023-09-18 PROCEDURE — 93793 ANTICOAG MGMT PT WARFARIN: CPT | Performed by: INTERNAL MEDICINE

## 2023-09-18 NOTE — PROGRESS NOTES
Anticoagulation Summary  As of 2023      INR goal:  2.5-3.5   TTR:  77.1 % (1.3 y)   INR used for dosin.80 (2023)   Warfarin maintenance plan:  3 mg (2 mg x 1.5) every day   Weekly warfarin total:  21 mg   Plan last modified:  Marline Rasmussen LPN ()   Next INR check:  10/2/2023   Target end date:   Indefinite    Indications    Long term (current) use of anticoagulants [Z79.01]  S/P AVR (aortic valve replacement) [Z95.2]                 Anticoagulation Episode Summary       INR check location:  Home Draw    Preferred lab:      Send INR reminders to:  [facility] CARDIOLOGY YANNA PT    Comments:  MD INR          Anticoagulation Care Providers       Provider Role Specialty Phone number    Zhane Sanchez MD [facility] Cardiology [phone]

## 2023-09-29 ENCOUNTER — ANTI-COAG VISIT (OUTPATIENT)
Age: 78
End: 2023-09-29

## 2023-09-29 DIAGNOSIS — Z95.2 S/P AVR (AORTIC VALVE REPLACEMENT): ICD-10-CM

## 2023-09-29 DIAGNOSIS — Z79.01 LONG TERM (CURRENT) USE OF ANTICOAGULANTS: Primary | ICD-10-CM

## 2023-09-29 LAB — INR BLD: 4

## 2023-09-29 NOTE — PROGRESS NOTES
Anticoagulation Summary  As of 2023      INR goal:  2.5-3.5   TTR:  76.7 % (1.3 y)   INR used for dosin.00 (2023)   Warfarin maintenance plan:  3 mg (2 mg x 1.5) every day   Weekly warfarin total:  21 mg   Plan last modified:  Emperatriz Garcia LPN ([id number])   Next INR check:  10/4/2023   Target end date:   Indefinite    Indications    Long term (current) use of anticoagulants [Z79.01]  S/P AVR (aortic valve replacement) [Z95.2]                 Anticoagulation Episode Summary       INR check location:  Home Draw    Preferred lab:      Send INR reminders to:  [facility] CARDIOLOGY YANNA PT    Comments:  MD INR          Anticoagulation Care Providers       Provider Role Specialty Phone number    Evelia Goodrich MD [facility] Cardiology [phone]

## 2023-10-16 ENCOUNTER — ANTI-COAG VISIT (OUTPATIENT)
Age: 78
End: 2023-10-16

## 2023-10-16 DIAGNOSIS — Z79.01 LONG TERM (CURRENT) USE OF ANTICOAGULANTS: Primary | ICD-10-CM

## 2023-10-16 DIAGNOSIS — Z95.2 S/P AVR (AORTIC VALVE REPLACEMENT): ICD-10-CM

## 2023-10-16 LAB — INR BLD: 2

## 2023-10-16 NOTE — PROGRESS NOTES
One time dose of 4 mg tonight and tomorrow night instead of 3 mg. Then continue current maintenance plan (see Anticoag Dosing Calendar). INR to be rechecked in 2 week(s).    LVM w/ instructions

## 2023-12-11 ENCOUNTER — ANTI-COAG VISIT (OUTPATIENT)
Age: 78
End: 2023-12-11
Payer: COMMERCIAL

## 2023-12-11 DIAGNOSIS — Z95.2 S/P AVR (AORTIC VALVE REPLACEMENT): ICD-10-CM

## 2023-12-11 DIAGNOSIS — Z79.01 LONG TERM (CURRENT) USE OF ANTICOAGULANTS: Primary | ICD-10-CM

## 2023-12-11 LAB — INR BLD: 2.5

## 2023-12-11 PROCEDURE — 93793 ANTICOAG MGMT PT WARFARIN: CPT | Performed by: INTERNAL MEDICINE

## 2023-12-14 ENCOUNTER — ANTI-COAG VISIT (OUTPATIENT)
Age: 78
End: 2023-12-14

## 2023-12-14 DIAGNOSIS — Z79.01 LONG TERM (CURRENT) USE OF ANTICOAGULANTS: Primary | ICD-10-CM

## 2023-12-14 DIAGNOSIS — Z95.2 S/P AVR (AORTIC VALVE REPLACEMENT): ICD-10-CM

## 2023-12-14 LAB — INR BLD: 2

## 2023-12-29 ENCOUNTER — ANTI-COAG VISIT (OUTPATIENT)
Age: 78
End: 2023-12-29

## 2023-12-29 DIAGNOSIS — Z79.01 LONG TERM (CURRENT) USE OF ANTICOAGULANTS: Primary | ICD-10-CM

## 2023-12-29 DIAGNOSIS — Z95.2 S/P AVR (AORTIC VALVE REPLACEMENT): ICD-10-CM

## 2023-12-29 LAB — INR BLD: 2.6

## 2024-02-16 ENCOUNTER — ANTI-COAG VISIT (OUTPATIENT)
Age: 79
End: 2024-02-16
Payer: COMMERCIAL

## 2024-02-16 DIAGNOSIS — Z79.01 LONG TERM (CURRENT) USE OF ANTICOAGULANTS: Primary | ICD-10-CM

## 2024-02-16 DIAGNOSIS — Z95.2 S/P AVR (AORTIC VALVE REPLACEMENT): ICD-10-CM

## 2024-02-16 LAB — INR BLD: 4.1

## 2024-02-16 PROCEDURE — 93793 ANTICOAG MGMT PT WARFARIN: CPT | Performed by: INTERNAL MEDICINE

## 2024-02-29 ENCOUNTER — TELEPHONE (OUTPATIENT)
Age: 79
End: 2024-02-29

## 2024-03-05 ENCOUNTER — ANTI-COAG VISIT (OUTPATIENT)
Age: 79
End: 2024-03-05
Payer: COMMERCIAL

## 2024-03-05 DIAGNOSIS — Z79.01 LONG TERM (CURRENT) USE OF ANTICOAGULANTS: Primary | ICD-10-CM

## 2024-03-05 DIAGNOSIS — Z95.2 S/P AVR (AORTIC VALVE REPLACEMENT): ICD-10-CM

## 2024-03-05 LAB — INR BLD: 3.6

## 2024-03-05 PROCEDURE — 93793 ANTICOAG MGMT PT WARFARIN: CPT | Performed by: INTERNAL MEDICINE

## 2024-03-20 ENCOUNTER — ANTI-COAG VISIT (OUTPATIENT)
Age: 79
End: 2024-03-20

## 2024-03-20 DIAGNOSIS — Z95.2 S/P AVR (AORTIC VALVE REPLACEMENT): ICD-10-CM

## 2024-03-20 DIAGNOSIS — Z79.01 LONG TERM (CURRENT) USE OF ANTICOAGULANTS: Primary | ICD-10-CM

## 2024-03-20 LAB — INR BLD: 3.1

## 2024-04-15 ENCOUNTER — ANTI-COAG VISIT (OUTPATIENT)
Age: 79
End: 2024-04-15
Payer: COMMERCIAL

## 2024-04-15 DIAGNOSIS — Z79.01 LONG TERM (CURRENT) USE OF ANTICOAGULANTS: Primary | ICD-10-CM

## 2024-04-15 DIAGNOSIS — Z95.2 S/P AVR (AORTIC VALVE REPLACEMENT): ICD-10-CM

## 2024-04-15 LAB — INR BLD: 3

## 2024-04-15 PROCEDURE — 93793 ANTICOAG MGMT PT WARFARIN: CPT | Performed by: INTERNAL MEDICINE

## 2024-05-07 ENCOUNTER — ANTI-COAG VISIT (OUTPATIENT)
Age: 79
End: 2024-05-07
Payer: COMMERCIAL

## 2024-05-07 DIAGNOSIS — Z79.01 LONG TERM (CURRENT) USE OF ANTICOAGULANTS: Primary | ICD-10-CM

## 2024-05-07 DIAGNOSIS — Z95.2 S/P AVR (AORTIC VALVE REPLACEMENT): ICD-10-CM

## 2024-05-07 LAB — INR BLD: 3

## 2024-05-07 PROCEDURE — 93793 ANTICOAG MGMT PT WARFARIN: CPT | Performed by: INTERNAL MEDICINE

## 2024-05-28 ENCOUNTER — ANTI-COAG VISIT (OUTPATIENT)
Age: 79
End: 2024-05-28
Payer: COMMERCIAL

## 2024-05-28 DIAGNOSIS — Z95.2 S/P AVR (AORTIC VALVE REPLACEMENT): ICD-10-CM

## 2024-05-28 DIAGNOSIS — Z79.01 LONG TERM (CURRENT) USE OF ANTICOAGULANTS: Primary | ICD-10-CM

## 2024-05-28 LAB — INR BLD: 3.5

## 2024-05-28 PROCEDURE — 93793 ANTICOAG MGMT PT WARFARIN: CPT | Performed by: INTERNAL MEDICINE

## 2024-06-21 ENCOUNTER — TELEPHONE (OUTPATIENT)
Age: 79
End: 2024-06-21

## 2024-06-21 NOTE — TELEPHONE ENCOUNTER
Patient does not have any supplies to check his protime Friend says we call them in for him but she dont know where?

## 2024-06-24 NOTE — TELEPHONE ENCOUNTER
Spoke with Lizbeth, explained to her that the reporting company [employer] is suppose to send supplies. Pt had the number and she will call them for the supplies.

## 2024-06-28 ENCOUNTER — ANTI-COAG VISIT (OUTPATIENT)
Age: 79
End: 2024-06-28

## 2024-06-28 DIAGNOSIS — Z79.01 LONG TERM (CURRENT) USE OF ANTICOAGULANTS: Primary | ICD-10-CM

## 2024-06-28 DIAGNOSIS — Z95.2 S/P AVR (AORTIC VALVE REPLACEMENT): ICD-10-CM

## 2024-06-28 LAB — INR BLD: 3.8

## 2024-06-28 NOTE — TELEPHONE ENCOUNTER
Requested Prescriptions     Pending Prescriptions Disp Refills    warfarin (COUMADIN) 1 MG tablet 60 tablet 0     Sig: Take 1 tablet by mouth every evening    warfarin (COUMADIN) 2 MG tablet 60 tablet 0     Sig: Take 1 tablet by mouth daily     Verified rx. Last OV 05/30/23. Erx to pharm on file.    Coumadin dosing verified 06/28/24    Next appointment 07/31/24

## 2024-06-28 NOTE — PROGRESS NOTES
Incoming call from Jose R with [facility] reporting nontherapeutic INR 3.8, LVM for pt to call the office back.      Received call back from Lizbeth, she reported patient takes 2mg daily except Saturdays 1mg. Updated pt chart to reflect this dosing. Changed dosing to 1mg Tuesday, Saturdays and 2 mg other days of the week. Verbalized understanding, will recheck home INR and report to office in 1 week.

## 2024-06-29 RX ORDER — WARFARIN SODIUM 1 MG/1
1 TABLET ORAL EVERY EVENING
Qty: 60 TABLET | Refills: 0 | Status: SHIPPED | OUTPATIENT
Start: 2024-06-29

## 2024-06-29 RX ORDER — WARFARIN SODIUM 2 MG/1
2 TABLET ORAL DAILY
Qty: 60 TABLET | Refills: 0 | Status: SHIPPED | OUTPATIENT
Start: 2024-06-29

## 2024-07-01 ENCOUNTER — ANTI-COAG VISIT (OUTPATIENT)
Age: 79
End: 2024-07-01
Payer: COMMERCIAL

## 2024-07-01 DIAGNOSIS — Z95.2 S/P AVR (AORTIC VALVE REPLACEMENT): ICD-10-CM

## 2024-07-01 DIAGNOSIS — Z79.01 LONG TERM (CURRENT) USE OF ANTICOAGULANTS: Primary | ICD-10-CM

## 2024-07-01 LAB — INR BLD: 3.8

## 2024-07-01 PROCEDURE — 93793 ANTICOAG MGMT PT WARFARIN: CPT | Performed by: INTERNAL MEDICINE

## 2024-07-15 ENCOUNTER — ANTI-COAG VISIT (OUTPATIENT)
Age: 79
End: 2024-07-15
Payer: COMMERCIAL

## 2024-07-15 DIAGNOSIS — Z95.2 S/P AVR (AORTIC VALVE REPLACEMENT): ICD-10-CM

## 2024-07-15 DIAGNOSIS — Z79.01 LONG TERM (CURRENT) USE OF ANTICOAGULANTS: Primary | ICD-10-CM

## 2024-07-15 LAB — INR BLD: 3

## 2024-07-15 PROCEDURE — 93793 ANTICOAG MGMT PT WARFARIN: CPT | Performed by: INTERNAL MEDICINE

## 2024-07-29 ENCOUNTER — ANTI-COAG VISIT (OUTPATIENT)
Age: 79
End: 2024-07-29

## 2024-07-29 DIAGNOSIS — Z79.01 LONG TERM (CURRENT) USE OF ANTICOAGULANTS: Primary | ICD-10-CM

## 2024-07-29 DIAGNOSIS — Z95.2 S/P AVR (AORTIC VALVE REPLACEMENT): ICD-10-CM

## 2024-07-29 LAB — INR BLD: 3.2

## 2024-07-29 NOTE — PATIENT INSTRUCTIONS
Reminder: Please contact the Coumadin Clinic at [phone]  when you have medication changes. Examples, new medications, antibiotics, discontinued medications, new supplements, missed doses of warfarin or if you took extra doses of warfarin.  This also includes OTC medications. Notifying us helps reduce the possibility of high and low INR's. In addition, if warfarin needs to be held for any procedures, please have surgeon or physician's office contact us before holding anticoagulant. Thanks, [facility] Cardiology Coumadin Clinic.

## 2024-07-31 ENCOUNTER — OFFICE VISIT (OUTPATIENT)
Age: 79
End: 2024-07-31
Payer: COMMERCIAL

## 2024-07-31 VITALS
SYSTOLIC BLOOD PRESSURE: 140 MMHG | HEART RATE: 76 BPM | DIASTOLIC BLOOD PRESSURE: 70 MMHG | WEIGHT: 164 LBS | BODY MASS INDEX: 24.86 KG/M2 | HEIGHT: 68 IN

## 2024-07-31 DIAGNOSIS — I25.10 CORONARY ARTERY DISEASE INVOLVING NATIVE HEART WITHOUT ANGINA PECTORIS, UNSPECIFIED VESSEL OR LESION TYPE: ICD-10-CM

## 2024-07-31 DIAGNOSIS — Z95.2 H/O MECHANICAL AORTIC VALVE REPLACEMENT: ICD-10-CM

## 2024-07-31 DIAGNOSIS — Z95.2 H/O HEART VALVE REPLACEMENT WITH MECHANICAL VALVE: ICD-10-CM

## 2024-07-31 DIAGNOSIS — D64.9 ANEMIA, UNSPECIFIED TYPE: ICD-10-CM

## 2024-07-31 DIAGNOSIS — R06.01 ORTHOPNEA: ICD-10-CM

## 2024-07-31 DIAGNOSIS — Z95.2 S/P AVR (AORTIC VALVE REPLACEMENT): Primary | ICD-10-CM

## 2024-07-31 LAB
ANION GAP SERPL CALC-SCNC: 11 MMOL/L (ref 9–18)
BASOPHILS # BLD: 0.1 K/UL (ref 0–0.2)
BASOPHILS NFR BLD: 1 % (ref 0–2)
BUN SERPL-MCNC: 16 MG/DL (ref 8–23)
CALCIUM SERPL-MCNC: 8.7 MG/DL (ref 8.8–10.2)
CHLORIDE SERPL-SCNC: 103 MMOL/L (ref 98–107)
CO2 SERPL-SCNC: 25 MMOL/L (ref 20–28)
CREAT SERPL-MCNC: 1.14 MG/DL (ref 0.8–1.3)
DIFFERENTIAL METHOD BLD: ABNORMAL
EOSINOPHIL # BLD: 0.2 K/UL (ref 0–0.8)
EOSINOPHIL NFR BLD: 2 % (ref 0.5–7.8)
ERYTHROCYTE [DISTWIDTH] IN BLOOD BY AUTOMATED COUNT: 18 % (ref 11.9–14.6)
FERRITIN SERPL-MCNC: 63 NG/ML (ref 8–388)
GLUCOSE SERPL-MCNC: 95 MG/DL (ref 70–99)
HCT VFR BLD AUTO: 33.6 % (ref 41.1–50.3)
HGB BLD-MCNC: 10 G/DL (ref 13.6–17.2)
IMM GRANULOCYTES # BLD AUTO: 0 K/UL (ref 0–0.5)
IMM GRANULOCYTES NFR BLD AUTO: 0 % (ref 0–5)
INR PPP: 3.8
IRON SERPL-MCNC: 23 UG/DL (ref 35–100)
LYMPHOCYTES # BLD: 1.4 K/UL (ref 0.5–4.6)
LYMPHOCYTES NFR BLD: 13 % (ref 13–44)
MCH RBC QN AUTO: 24.9 PG (ref 26.1–32.9)
MCHC RBC AUTO-ENTMCNC: 29.8 G/DL (ref 31.4–35)
MCV RBC AUTO: 83.8 FL (ref 82–102)
MONOCYTES # BLD: 1.1 K/UL (ref 0.1–1.3)
MONOCYTES NFR BLD: 10 % (ref 4–12)
NEUTS SEG # BLD: 7.8 K/UL (ref 1.7–8.2)
NEUTS SEG NFR BLD: 74 % (ref 43–78)
NRBC # BLD: 0 K/UL (ref 0–0.2)
NT PRO BNP: 5143 PG/ML (ref 0–450)
PLATELET # BLD AUTO: 350 K/UL (ref 150–450)
PMV BLD AUTO: 10.7 FL (ref 9.4–12.3)
POTASSIUM SERPL-SCNC: 3.9 MMOL/L (ref 3.5–5.1)
PROTHROMBIN TIME: 39.6 SEC (ref 11.3–14.9)
RBC # BLD AUTO: 4.01 M/UL (ref 4.23–5.6)
SODIUM SERPL-SCNC: 139 MMOL/L (ref 136–145)
WBC # BLD AUTO: 10.6 K/UL (ref 4.3–11.1)

## 2024-07-31 PROCEDURE — 3078F DIAST BP <80 MM HG: CPT | Performed by: INTERNAL MEDICINE

## 2024-07-31 PROCEDURE — 99214 OFFICE O/P EST MOD 30 MIN: CPT | Performed by: INTERNAL MEDICINE

## 2024-07-31 PROCEDURE — 3077F SYST BP >= 140 MM HG: CPT | Performed by: INTERNAL MEDICINE

## 2024-07-31 PROCEDURE — 1123F ACP DISCUSS/DSCN MKR DOCD: CPT | Performed by: INTERNAL MEDICINE

## 2024-07-31 NOTE — PROGRESS NOTES
[facility], [address], [address]  [place] [address]  PHONE: [phone]    SUBJECTIVE:   Abraham Kraus is a 79 y.o. male 1945   seen for a follow up visit regarding the following:     Chief Complaint   Patient presents with    Follow-Up from Hospital    Hypertension    Hyperlipidemia    Coronary Artery Disease         History of present illness: 79 y.o. male presented for follow-up 7/31/24 discharged on the Procardia system.  Admitted for respiratory failure Hospital course complicated by anemia most recent labs reviewed with hemoglobin 8.9.  He is still smoking.  He was discharged on Farxiga and Lasix.  Shortness of breath is at baseline.      Interval Hx:   The patient was formerly seen by Dr. Bnoilla Rajput.  He has a previous history of coronary artery bypass grafting in 1996 with SVG to LAD, SVG to RCA.  Aortic valve replacement with 25 mm St. Rogelio mechanical valve for severe aortic stenosis.  The patient  had non-ST elevation myocardial infarction.  He underwent PCI to left main and proximal circumflex vessel, proximal SVG graft to LAD and PCI and vein graft to LAD in 09/2018.  He had subsequent hospital admissions for recurrent infections and was readmitted  in 10/2018 with elevated cardiac biomarkers.        Cardiac History:     Coronary artery bypass grafting in 1986, SVG to LAD, SVG to RCA, aortic valve replacement with 25 mm St. Rogelio mechanical valve.      NSTEMI 10/2018 PCI left main, circumflex with Synergy drug eluting stent x3, PCI and stenting of proximal vein graft to LAD, [place] drug eluting stent.      10/26/2018 non-ST elevation myocardial infarction with 80% distal thrombus in left main and angioplasty performed, SVG to RCA patent.    Enterococcal prosthetic aortic valve endocarditis    RICHARD small aortic valve vegetation    1/2019 life long antibiotics recommended by ID. Not candidate for AVR per previous consult ser vice.     6/2019 antibiotics were discontinued

## 2024-08-01 ENCOUNTER — TELEPHONE (OUTPATIENT)
Age: 79
End: 2024-08-01

## 2024-08-01 ENCOUNTER — ANTI-COAG VISIT (OUTPATIENT)
Age: 79
End: 2024-08-01

## 2024-08-01 DIAGNOSIS — I10 ESSENTIAL HYPERTENSION: Primary | ICD-10-CM

## 2024-08-01 DIAGNOSIS — Z79.01 LONG TERM (CURRENT) USE OF ANTICOAGULANTS: Primary | ICD-10-CM

## 2024-08-01 DIAGNOSIS — Z95.2 S/P AVR (AORTIC VALVE REPLACEMENT): ICD-10-CM

## 2024-08-01 DIAGNOSIS — I21.4 NSTEMI (NON-ST ELEVATED MYOCARDIAL INFARCTION) (HCC): ICD-10-CM

## 2024-08-01 RX ORDER — FUROSEMIDE 40 MG/1
40 TABLET ORAL DAILY
Qty: 30 TABLET | Refills: 5 | Status: SHIPPED | OUTPATIENT
Start: 2024-08-01

## 2024-08-01 NOTE — TELEPHONE ENCOUNTER
----- Message from Rogelio Hastings MD sent at 8/1/2024  4:52 AM EDT -----  The patient's chest x-ray yesterday showed pulmonary edema.  Please advise him to take Lasix 40 mg daily.  He currently takes Lasix 20 mg daily.  Please advise him to have a BMP checked within the next 7 to 10 days.  [place] let [place] know the lab order is valid at the [facility].

## 2024-08-01 NOTE — TELEPHONE ENCOUNTER
I called and informed pt.wife of 's response and she v/u.Med escribed as below and labs ordered as below:  Orders Placed This Encounter   Procedures    Basic Metabolic Panel     Standing Status:   Future     Standing Expiration Date:   8/1/2025     Requested Prescriptions     Signed Prescriptions Disp Refills    furosemide (LASIX) 40 MG tablet 30 tablet 5     Sig: Take 1 tablet by mouth daily     Authorizing Provider: YANIRA HENELY     Ordering User: ENRRIQUE JONES

## 2024-08-07 ENCOUNTER — ANTI-COAG VISIT (OUTPATIENT)
Age: 79
End: 2024-08-07

## 2024-08-07 DIAGNOSIS — Z95.2 S/P AVR (AORTIC VALVE REPLACEMENT): ICD-10-CM

## 2024-08-07 DIAGNOSIS — Z79.01 LONG TERM (CURRENT) USE OF ANTICOAGULANTS: Primary | ICD-10-CM

## 2024-08-07 LAB
INR BLD: 1.7
INR BLD: 2
INR BLD: 2.5
INR BLD: 3.2
INR BLD: 3.6
INR BLD: 3.8
INR BLD: 4.7

## 2024-08-07 NOTE — PROGRESS NOTES
Florencio SLOAN, received a phone call from HALI, stating that pt called in multiple INR's, for multiple different dates on yesterday.  INR's are now recorded.

## 2024-08-08 LAB — INR BLD: 3.5

## 2024-08-27 LAB
BUN SERPL-MCNC: 14 MG/DL (ref 8–27)
BUN/CREAT SERPL: 13 (ref 10–24)
CALCIUM SERPL-MCNC: 8.9 MG/DL (ref 8.6–10.2)
CHLORIDE SERPL-SCNC: 101 MMOL/L (ref 96–106)
CO2 SERPL-SCNC: 21 MMOL/L (ref 20–29)
CREAT SERPL-MCNC: 1.1 MG/DL (ref 0.76–1.27)
EGFRCR SERPLBLD CKD-EPI 2021: 69 ML/MIN/1.73
GLUCOSE SERPL-MCNC: 111 MG/DL (ref 70–99)
POTASSIUM SERPL-SCNC: 4.5 MMOL/L (ref 3.5–5.2)
SODIUM SERPL-SCNC: 137 MMOL/L (ref 134–144)
SPECIMEN STATUS REPORT: NORMAL

## 2024-08-29 ENCOUNTER — OFFICE VISIT (OUTPATIENT)
Age: 79
End: 2024-08-29
Payer: COMMERCIAL

## 2024-08-29 VITALS
HEIGHT: 67 IN | HEART RATE: 81 BPM | WEIGHT: 162 LBS | BODY MASS INDEX: 25.43 KG/M2 | SYSTOLIC BLOOD PRESSURE: 118 MMHG | DIASTOLIC BLOOD PRESSURE: 70 MMHG

## 2024-08-29 DIAGNOSIS — Z95.2 S/P AVR (AORTIC VALVE REPLACEMENT): ICD-10-CM

## 2024-08-29 DIAGNOSIS — I25.10 CORONARY ARTERY DISEASE INVOLVING NATIVE HEART WITHOUT ANGINA PECTORIS, UNSPECIFIED VESSEL OR LESION TYPE: Primary | ICD-10-CM

## 2024-08-29 PROCEDURE — 99214 OFFICE O/P EST MOD 30 MIN: CPT | Performed by: INTERNAL MEDICINE

## 2024-08-29 PROCEDURE — 93000 ELECTROCARDIOGRAM COMPLETE: CPT | Performed by: INTERNAL MEDICINE

## 2024-08-29 PROCEDURE — 1123F ACP DISCUSS/DSCN MKR DOCD: CPT | Performed by: INTERNAL MEDICINE

## 2024-08-29 PROCEDURE — 3074F SYST BP LT 130 MM HG: CPT | Performed by: INTERNAL MEDICINE

## 2024-08-29 PROCEDURE — 3078F DIAST BP <80 MM HG: CPT | Performed by: INTERNAL MEDICINE

## 2024-08-29 RX ORDER — SPIRONOLACTONE 25 MG/1
25 TABLET ORAL DAILY
Qty: 90 TABLET | Refills: 3 | Status: SHIPPED | OUTPATIENT
Start: 2024-08-29

## 2024-08-29 RX ORDER — ATORVASTATIN CALCIUM 40 MG/1
40 TABLET, FILM COATED ORAL DAILY
Qty: 90 TABLET | Refills: 3 | Status: SHIPPED | OUTPATIENT
Start: 2024-08-29

## 2024-08-29 RX ORDER — METOPROLOL SUCCINATE 25 MG/1
25 TABLET, EXTENDED RELEASE ORAL DAILY
Qty: 90 TABLET | Refills: 3 | Status: SHIPPED | OUTPATIENT
Start: 2024-08-29

## 2024-08-29 RX ORDER — FUROSEMIDE 40 MG
40 TABLET ORAL DAILY
Qty: 90 TABLET | Refills: 3 | Status: SHIPPED | OUTPATIENT
Start: 2024-08-29

## 2024-08-29 RX ORDER — FERROUS SULFATE 325(65) MG
325 TABLET ORAL
COMMUNITY

## 2024-08-29 NOTE — PROGRESS NOTES
[facility], [address], [address]  [place] [address]  PHONE: [phone]    SUBJECTIVE:   Abraham Kraus is a 79 y.o. male 1945   seen for a follow up visit regarding the following:     Chief Complaint   Patient presents with    Valvular Heart Disease         History of present illness: 79 y.o. male presented for follow-up 8/30/24 recent admission for decompensated heart failure.  At her last appointment patient had evidence of volume overload and his Lasix dose was increased.  He had a chest radiograph with pulmonary edema as well as labs with elevated BNP.  His breathing is improved but he still has swelling in his lower extremities.      Interval Hx:   The patient was formerly seen by Dr. Bonilla Rajput.  He has a previous history of coronary artery bypass grafting in 1996 with SVG to LAD, SVG to RCA.  Aortic valve replacement with 25 mm St. Rogelio mechanical valve for severe aortic stenosis.  The patient  had non-ST elevation myocardial infarction.  He underwent PCI to left main and proximal circumflex vessel, proximal SVG graft to LAD and PCI and vein graft to LAD in 09/2018.  He had subsequent hospital admissions for recurrent infections and was readmitted  in 10/2018 with elevated cardiac biomarkers.        Cardiac History:     Coronary artery bypass grafting in 1986, SVG to LAD, SVG to RCA, aortic valve replacement with 25 mm St. Rogelio mechanical valve.      NSTEMI 10/2018 PCI left main, circumflex with Synergy drug eluting stent x3, PCI and stenting of proximal vein graft to LAD, [place] drug eluting stent.      10/26/2018 non-ST elevation myocardial infarction with 80% distal thrombus in left main and angioplasty performed, SVG to RCA patent.    Enterococcal prosthetic aortic valve endocarditis    RICHARD small aortic valve vegetation    1/2019 life long antibiotics recommended by ID. Not candidate for AVR per previous consult ser vice.     6/2019 antibiotics were discontinued by ID  08/29/24.        EMILIE Rosario was seen today for valvular heart disease.    Diagnoses and all orders for this visit:    Coronary artery disease involving native heart without angina pectoris, unspecified vessel or lesion type  -     EKG 12 lead  -     Basic Metabolic Panel; Future    S/P AVR (aortic valve replacement)    Other orders  -     atorvastatin (LIPITOR) 40 MG tablet; Take 1 tablet by mouth daily  -     furosemide (LASIX) 40 MG tablet; Take 1 tablet by mouth daily  -     metoprolol succinate (TOPROL XL) 25 MG extended release tablet; Take 1 tablet by mouth daily  -     spironolactone (ALDACTONE) 25 MG tablet; Take 1 tablet by mouth daily      Return in about 3 months (around 11/29/2024).       Rogelio Hastings MD  8/30/2024  4:57 AM

## 2024-09-04 ENCOUNTER — ANTI-COAG VISIT (OUTPATIENT)
Age: 79
End: 2024-09-04

## 2024-09-04 DIAGNOSIS — Z79.01 LONG TERM (CURRENT) USE OF ANTICOAGULANTS: Primary | ICD-10-CM

## 2024-09-04 DIAGNOSIS — Z95.2 S/P AVR (AORTIC VALVE REPLACEMENT): ICD-10-CM

## 2024-09-04 LAB — INR BLD: 3

## 2024-12-04 ENCOUNTER — ANTI-COAG VISIT (OUTPATIENT)
Age: 79
End: 2024-12-04

## 2024-12-04 ENCOUNTER — OFFICE VISIT (OUTPATIENT)
Age: 79
End: 2024-12-04
Payer: COMMERCIAL

## 2024-12-04 VITALS
BODY MASS INDEX: 23.19 KG/M2 | HEART RATE: 80 BPM | HEIGHT: 68 IN | SYSTOLIC BLOOD PRESSURE: 132 MMHG | WEIGHT: 153 LBS | DIASTOLIC BLOOD PRESSURE: 50 MMHG

## 2024-12-04 DIAGNOSIS — Z95.2 S/P AVR (AORTIC VALVE REPLACEMENT): Primary | ICD-10-CM

## 2024-12-04 DIAGNOSIS — Z95.2 S/P AVR (AORTIC VALVE REPLACEMENT): ICD-10-CM

## 2024-12-04 DIAGNOSIS — Z79.01 LONG TERM (CURRENT) USE OF ANTICOAGULANTS: Primary | ICD-10-CM

## 2024-12-04 DIAGNOSIS — I25.10 CORONARY ARTERY DISEASE INVOLVING NATIVE HEART WITHOUT ANGINA PECTORIS, UNSPECIFIED VESSEL OR LESION TYPE: ICD-10-CM

## 2024-12-04 DIAGNOSIS — I10 ESSENTIAL HYPERTENSION: ICD-10-CM

## 2024-12-04 DIAGNOSIS — I50.30 HEART FAILURE WITH PRESERVED EJECTION FRACTION, UNSPECIFIED HF CHRONICITY (HCC): ICD-10-CM

## 2024-12-04 LAB — INR BLD: 1.9

## 2024-12-04 PROCEDURE — 1123F ACP DISCUSS/DSCN MKR DOCD: CPT | Performed by: INTERNAL MEDICINE

## 2024-12-04 PROCEDURE — 3075F SYST BP GE 130 - 139MM HG: CPT | Performed by: INTERNAL MEDICINE

## 2024-12-04 PROCEDURE — 1126F AMNT PAIN NOTED NONE PRSNT: CPT | Performed by: INTERNAL MEDICINE

## 2024-12-04 PROCEDURE — 99214 OFFICE O/P EST MOD 30 MIN: CPT | Performed by: INTERNAL MEDICINE

## 2024-12-04 PROCEDURE — 3078F DIAST BP <80 MM HG: CPT | Performed by: INTERNAL MEDICINE

## 2024-12-04 RX ORDER — METOPROLOL SUCCINATE 25 MG/1
25 TABLET, EXTENDED RELEASE ORAL DAILY
Qty: 90 TABLET | Refills: 3 | Status: SHIPPED | OUTPATIENT
Start: 2024-12-04

## 2024-12-04 RX ORDER — WARFARIN SODIUM 1 MG/1
1 TABLET ORAL EVERY EVENING
Qty: 60 TABLET | Refills: 3 | Status: SHIPPED | OUTPATIENT
Start: 2024-12-04

## 2024-12-04 RX ORDER — WARFARIN SODIUM 2 MG/1
2 TABLET ORAL DAILY
Qty: 60 TABLET | Refills: 3 | Status: SHIPPED | OUTPATIENT
Start: 2024-12-04

## 2024-12-04 RX ORDER — NITROGLYCERIN 0.4 MG/1
0.4 TABLET SUBLINGUAL EVERY 5 MIN PRN
Qty: 30 TABLET | Refills: 3 | Status: SHIPPED | OUTPATIENT
Start: 2024-12-04

## 2024-12-04 RX ORDER — SPIRONOLACTONE 25 MG/1
25 TABLET ORAL DAILY
Qty: 90 TABLET | Refills: 3 | Status: SHIPPED | OUTPATIENT
Start: 2024-12-04

## 2024-12-04 RX ORDER — ATORVASTATIN CALCIUM 40 MG/1
40 TABLET, FILM COATED ORAL DAILY
Qty: 90 TABLET | Refills: 3 | Status: SHIPPED | OUTPATIENT
Start: 2024-12-04

## 2024-12-04 RX ORDER — FUROSEMIDE 40 MG/1
40 TABLET ORAL DAILY
Qty: 90 TABLET | Refills: 3 | Status: SHIPPED | OUTPATIENT
Start: 2024-12-04

## 2024-12-04 NOTE — PROGRESS NOTES
Skin:     General: Skin is warm.   Neurological:      Mental Status: He is alert. Mental status is at baseline.       Physical Exam  Few crackles heard in lungs.    Medical problems and test results were reviewed with the patient today.           No results found for this or any previous visit (from the past 672 hour(s)).  No results found for: \"CHOL\", \"CHOLPOCT\", \"CHLST\", \"CHOLV\", \"HDL\", \"HDLPOC\", \"HDLC\", \"LDL\", \"LDLC\", \"VLDLC\", \"VLDL\"    No results found for any visits on 12/04/24.        EMILIE Rosario was seen today for coronary artery disease.    Diagnoses and all orders for this visit:    S/P AVR (aortic valve replacement)    Coronary artery disease involving native heart without angina pectoris, unspecified vessel or lesion type    Essential hypertension    Heart failure with preserved ejection fraction, unspecified HF chronicity (HCC)    Other orders  -     warfarin (COUMADIN) 2 MG tablet; Take 1 tablet by mouth daily  -     warfarin (COUMADIN) 1 MG tablet; Take 1 tablet by mouth every evening  -     spironolactone (ALDACTONE) 25 MG tablet; Take 1 tablet by mouth daily  -     nitroGLYCERIN (NITROSTAT) 0.4 MG SL tablet; Place 1 tablet under the tongue every 5 minutes as needed for Chest pain  -     metoprolol succinate (TOPROL XL) 25 MG extended release tablet; Take 1 tablet by mouth daily  -     furosemide (LASIX) 40 MG tablet; Take 1 tablet by mouth daily  -     atorvastatin (LIPITOR) 40 MG tablet; Take 1 tablet by mouth daily      Return in about 6 months (around 6/4/2025).       Rogelio Hastings MD  12/4/2024  3:27 PM      The patient (or guardian, if applicable) and other individuals in attendance with the patient were advised that Artificial Intelligence will be utilized during this visit to record, process the conversation to generate a clinical note, and support improvement of the AI technology. The patient (or guardian, if applicable) and other individuals in attendance at the appointment consented to

## 2024-12-19 ENCOUNTER — TELEPHONE (OUTPATIENT)
Age: 79
End: 2024-12-19

## 2024-12-19 NOTE — TELEPHONE ENCOUNTER
MEDICATION REFILL REQUEST      Name of Medication:  Klor-Con  Dose:    Frequency:    Quantity:  3  Days' supply:  90      Pharmacy Name/Location:      [facility]  [address], [place]    Some question as to what strength and specifics as it is not listed in the formulary  Please call and advise.

## 2024-12-19 NOTE — TELEPHONE ENCOUNTER
LVM for LUCIANA Meeks. Alfonzo Barton is not on the pts medication list and the pt did not reporting taking this at his last office visit 12/4/2024.

## 2024-12-19 NOTE — TELEPHONE ENCOUNTER
Lizbeth called back, she says the Klor Con was last filled 11/30/2024 by Dr. Hastings, I told her that it was Discontinued by: Rogelio Hastings MD on 8/29/2024. Lizbeth asked if I could verify with Dr. Hastings and call her back.

## 2024-12-20 NOTE — TELEPHONE ENCOUNTER
I spoke with Lizbeth and let her know I confirmed that per Dr. Hastings  Potassium was discontinued due to the initiation of spironolactone

## 2025-01-17 ENCOUNTER — ANTI-COAG VISIT (OUTPATIENT)
Age: 80
End: 2025-01-17

## 2025-01-17 DIAGNOSIS — Z95.2 S/P AVR (AORTIC VALVE REPLACEMENT): ICD-10-CM

## 2025-01-17 DIAGNOSIS — Z79.01 LONG TERM (CURRENT) USE OF ANTICOAGULANTS: Primary | ICD-10-CM

## 2025-01-17 LAB — INR BLD: 3.9

## 2025-03-04 ENCOUNTER — ANTI-COAG VISIT (OUTPATIENT)
Age: 80
End: 2025-03-04

## 2025-03-04 DIAGNOSIS — Z79.01 LONG TERM (CURRENT) USE OF ANTICOAGULANTS: Primary | ICD-10-CM

## 2025-03-04 DIAGNOSIS — Z95.2 S/P AVR (AORTIC VALVE REPLACEMENT): ICD-10-CM

## 2025-03-04 LAB — INR BLD: 3.6

## 2025-03-24 ENCOUNTER — ANTI-COAG VISIT (OUTPATIENT)
Age: 80
End: 2025-03-24

## 2025-03-24 DIAGNOSIS — Z95.2 S/P AVR (AORTIC VALVE REPLACEMENT): ICD-10-CM

## 2025-03-24 DIAGNOSIS — Z79.01 LONG TERM (CURRENT) USE OF ANTICOAGULANTS: Primary | ICD-10-CM

## 2025-03-24 LAB — INR BLD: 4.8

## 2025-03-25 RX ORDER — NITROGLYCERIN 0.4 MG/1
0.4 TABLET SUBLINGUAL EVERY 5 MIN PRN
Qty: 30 TABLET | Refills: 3 | Status: SHIPPED | OUTPATIENT
Start: 2025-03-25

## 2025-04-21 ENCOUNTER — OFFICE VISIT (OUTPATIENT)
Age: 80
End: 2025-04-21
Payer: COMMERCIAL

## 2025-04-21 VITALS
WEIGHT: 148 LBS | SYSTOLIC BLOOD PRESSURE: 154 MMHG | BODY MASS INDEX: 22.43 KG/M2 | DIASTOLIC BLOOD PRESSURE: 72 MMHG | HEART RATE: 72 BPM | HEIGHT: 68 IN

## 2025-04-21 DIAGNOSIS — Z95.2 S/P AVR (AORTIC VALVE REPLACEMENT): Primary | ICD-10-CM

## 2025-04-21 DIAGNOSIS — I50.30 HEART FAILURE WITH PRESERVED EJECTION FRACTION, UNSPECIFIED HF CHRONICITY (HCC): ICD-10-CM

## 2025-04-21 DIAGNOSIS — I25.10 CORONARY ARTERY DISEASE INVOLVING NATIVE HEART WITHOUT ANGINA PECTORIS, UNSPECIFIED VESSEL OR LESION TYPE: ICD-10-CM

## 2025-04-21 PROCEDURE — 1126F AMNT PAIN NOTED NONE PRSNT: CPT | Performed by: INTERNAL MEDICINE

## 2025-04-21 PROCEDURE — 1123F ACP DISCUSS/DSCN MKR DOCD: CPT | Performed by: INTERNAL MEDICINE

## 2025-04-21 PROCEDURE — 3077F SYST BP >= 140 MM HG: CPT | Performed by: INTERNAL MEDICINE

## 2025-04-21 PROCEDURE — 99214 OFFICE O/P EST MOD 30 MIN: CPT | Performed by: INTERNAL MEDICINE

## 2025-04-21 PROCEDURE — 3078F DIAST BP <80 MM HG: CPT | Performed by: INTERNAL MEDICINE

## 2025-04-21 RX ORDER — SPIRONOLACTONE 25 MG/1
25 TABLET ORAL DAILY
Qty: 90 TABLET | Refills: 3 | Status: SHIPPED | OUTPATIENT
Start: 2025-04-21

## 2025-04-21 RX ORDER — POTASSIUM CHLORIDE 750 MG/1
8 TABLET, EXTENDED RELEASE ORAL 2 TIMES DAILY
COMMUNITY
End: 2025-04-21 | Stop reason: ALTCHOICE

## 2025-04-21 RX ORDER — FUROSEMIDE 40 MG/1
TABLET ORAL
Qty: 90 TABLET | Refills: 3 | Status: SHIPPED | OUTPATIENT
Start: 2025-04-21

## 2025-04-21 NOTE — PROGRESS NOTES
file.   Social History     Tobacco Use    Smoking status: Every Day     Current packs/day: 0.25     Average packs/day: 0.3 packs/day for 6.6 years (1.6 ttl pk-yrs)     Types: Cigarettes     Start date: 9/26/2018    Smokeless tobacco: Never    Tobacco comments:     Quit smoking: trying to quit   Substance Use Topics    Alcohol use: No       ROS:    ROS        PHYSICAL EXAM:    BP (!) 154/72   Pulse 72   Ht 1.727 m (5' 8\")   Wt 67.1 kg (148 lb)   BMI 22.50 kg/m²        Wt Readings from Last 3 Encounters:   04/21/25 67.1 kg (148 lb)   12/04/24 69.4 kg (153 lb)   08/29/24 73.5 kg (162 lb)     BP Readings from Last 3 Encounters:   04/21/25 (!) 154/72   12/04/24 (!) 132/50   08/29/24 118/70           Physical Exam  Vitals reviewed.   Constitutional:       Appearance: He is ill-appearing.   HENT:      Head: Normocephalic.      Right Ear: External ear normal.      Left Ear: External ear normal.      Nose: Nose normal.   Eyes:      General: No scleral icterus.  Cardiovascular:      Heart sounds: Murmur heard.   Pulmonary:      Effort: Pulmonary effort is normal.      Breath sounds: Wheezing present.   Abdominal:      General: There is no distension.   Musculoskeletal:      Cervical back: Neck supple.   Skin:     General: Skin is warm.   Neurological:      Mental Status: He is alert. Mental status is at baseline.       Physical Exam  Lungs: Breath sounds auscultated.    Medical problems and test results were reviewed with the patient today.           No results found for this or any previous visit (from the past 4 weeks).  No results found for: \"CHOL\", \"CHOLPOCT\", \"CHLST\", \"CHOLV\", \"HDL\", \"HDLPOC\", \"HDLC\", \"LDL\", \"LDLC\", \"VLDLC\", \"VLDL\"    No results found for any visits on 04/21/25.        EMILIE Rosario was seen today for coronary artery disease.    Diagnoses and all orders for this visit:    S/P AVR (aortic valve replacement)    Coronary artery disease involving native heart without angina pectoris, unspecified vessel or

## 2025-04-22 ENCOUNTER — ANTI-COAG VISIT (OUTPATIENT)
Age: 80
End: 2025-04-22

## 2025-04-22 DIAGNOSIS — Z79.01 LONG TERM (CURRENT) USE OF ANTICOAGULANTS: Primary | ICD-10-CM

## 2025-04-22 DIAGNOSIS — Z95.2 S/P AVR (AORTIC VALVE REPLACEMENT): ICD-10-CM

## 2025-04-22 LAB — INR BLD: 3.7

## 2025-04-28 ENCOUNTER — TELEPHONE (OUTPATIENT)
Age: 80
End: 2025-04-28

## 2025-05-02 ENCOUNTER — TELEPHONE (OUTPATIENT)
Age: 80
End: 2025-05-02

## 2025-05-19 ENCOUNTER — ANTI-COAG VISIT (OUTPATIENT)
Age: 80
End: 2025-05-19

## 2025-05-19 DIAGNOSIS — Z95.2 S/P AVR (AORTIC VALVE REPLACEMENT): ICD-10-CM

## 2025-05-19 DIAGNOSIS — Z79.01 LONG TERM (CURRENT) USE OF ANTICOAGULANTS: Primary | ICD-10-CM

## 2025-05-19 LAB — INR BLD: 4

## 2025-05-23 NOTE — TELEPHONE ENCOUNTER
Spouse called to see if the lab order faxed to [facility]. Please call spouse back, she will have fax #

## 2025-05-28 NOTE — TELEPHONE ENCOUNTER
I spoke with spouse, she lives close to [facility] and would like to have the BMP lab faxed to them. She gave me fax [phone]. I faxed the lab. She also asked for 2 mg warfarin to be faxed to [facility].  Requested Prescriptions     Pending Prescriptions Disp Refills    warfarin (COUMADIN) 2 MG tablet 60 tablet 3     Sig: Take 1 tablet by mouth daily

## 2025-05-29 RX ORDER — WARFARIN SODIUM 2 MG/1
2 TABLET ORAL DAILY
Qty: 60 TABLET | Refills: 3 | Status: SHIPPED | OUTPATIENT
Start: 2025-05-29

## 2025-06-03 ENCOUNTER — ANTI-COAG VISIT (OUTPATIENT)
Age: 80
End: 2025-06-03

## 2025-06-03 DIAGNOSIS — Z79.01 LONG TERM (CURRENT) USE OF ANTICOAGULANTS: Primary | ICD-10-CM

## 2025-06-03 DIAGNOSIS — Z95.2 S/P AVR (AORTIC VALVE REPLACEMENT): ICD-10-CM

## 2025-06-03 LAB — INR BLD: 3.8

## 2025-06-19 ENCOUNTER — OFFICE VISIT (OUTPATIENT)
Age: 80
End: 2025-06-19
Payer: COMMERCIAL

## 2025-06-19 VITALS
DIASTOLIC BLOOD PRESSURE: 56 MMHG | HEIGHT: 68 IN | HEART RATE: 72 BPM | SYSTOLIC BLOOD PRESSURE: 142 MMHG | BODY MASS INDEX: 22.5 KG/M2

## 2025-06-19 DIAGNOSIS — Z95.2 S/P AVR (AORTIC VALVE REPLACEMENT): ICD-10-CM

## 2025-06-19 DIAGNOSIS — I25.10 CORONARY ARTERY DISEASE INVOLVING NATIVE HEART WITHOUT ANGINA PECTORIS, UNSPECIFIED VESSEL OR LESION TYPE: ICD-10-CM

## 2025-06-19 DIAGNOSIS — I50.30 HEART FAILURE WITH PRESERVED EJECTION FRACTION, UNSPECIFIED HF CHRONICITY (HCC): ICD-10-CM

## 2025-06-19 DIAGNOSIS — Z95.2 H/O HEART VALVE REPLACEMENT WITH MECHANICAL VALVE: ICD-10-CM
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
## 2025-06-19 DIAGNOSIS — Z79.01 LONG TERM (CURRENT) USE OF ANTICOAGULANTS: Primary | ICD-10-CM

## 2025-06-19 DIAGNOSIS — I10 ESSENTIAL HYPERTENSION: ICD-10-CM

## 2025-06-19 PROCEDURE — 3077F SYST BP >= 140 MM HG: CPT | Performed by: INTERNAL MEDICINE

## 2025-06-19 PROCEDURE — 1126F AMNT PAIN NOTED NONE PRSNT: CPT | Performed by: INTERNAL MEDICINE

## 2025-06-19 PROCEDURE — 1159F MED LIST DOCD IN RCRD: CPT | Performed by: INTERNAL MEDICINE

## 2025-06-19 PROCEDURE — 1123F ACP DISCUSS/DSCN MKR DOCD: CPT | Performed by: INTERNAL MEDICINE

## 2025-06-19 PROCEDURE — 99214 OFFICE O/P EST MOD 30 MIN: CPT | Performed by: INTERNAL MEDICINE

## 2025-06-19 PROCEDURE — 3078F DIAST BP <80 MM HG: CPT | Performed by: INTERNAL MEDICINE

## 2025-06-19 RX ORDER — FUROSEMIDE 40 MG/1
TABLET ORAL
Qty: 90 TABLET | Refills: 3 | Status: SHIPPED | OUTPATIENT
Start: 2025-06-19

## 2025-06-19 NOTE — PROGRESS NOTES
0.083% nebulizer solution Inhale 3 mLs into the lungs every 4 hours as needed    betamethasone valerate (VALISONE) 0.1 % lotion Apply topically 2 times daily    HYDROcodone-acetaminophen (NORCO)  MG per tablet Take 1 tablet by mouth.    hydrocortisone 1 % cream Place rectally as needed    levothyroxine (SYNTHROID) 25 MCG tablet Take by mouth every morning (before breakfast)    omeprazole (PRILOSEC) 40 MG delayed release capsule Take 1 capsule by mouth daily    ondansetron (ZOFRAN) 8 MG tablet Take 1 tablet by mouth every 8 hours as needed    polyethylene glycol (GLYCOLAX) 17 GM/SCOOP powder Take 17 g by mouth daily    sucralfate (CARAFATE) 1 GM tablet Take 1 tablet by mouth in the morning and at bedtime    tamsulosin (FLOMAX) 0.4 MG capsule Take 1 capsule by mouth in the morning and at bedtime    venlafaxine (EFFEXOR XR) 75 MG extended release capsule Take 1 capsule by mouth     No current facility-administered medications for this visit.       Past Medical History, Past Surgical History, Family history, Social History, and Medications were all reviewed with the patient today and updated as necessary.       Allergies   Allergen Reactions    Fluoxetine Other (See Comments)    Fluticasone-Salmeterol Other (See Comments)     rash    Tuberculin, Ppd      Past Medical History:   Diagnosis Date    Aortic stenosis 10/22/2015    Chest pain 10/22/2015    COPD (chronic obstructive pulmonary disease) ([facility])     Coronary artery disease     Coronary atherosclerosis of native coronary artery 10/22/2015    CVA (cerebrovascular accident) (Former[facility]) 2004    right hemispheric    Essential hypertension 10/22/2015    Mixed hyperlipidemia 10/22/2015    Tobacco use disorder 10/22/2015     Past Surgical History:   Procedure Laterality Date    CORONARY ARTERY BYPASS GRAFT  1996     No family history on file.   Social History     Tobacco Use    Smoking status: Every Day     Current packs/day: 0.25     Average packs/day: 0.3 packs/day for  no